# Patient Record
Sex: FEMALE | Race: WHITE | Employment: OTHER | URBAN - METROPOLITAN AREA
[De-identification: names, ages, dates, MRNs, and addresses within clinical notes are randomized per-mention and may not be internally consistent; named-entity substitution may affect disease eponyms.]

---

## 2018-03-20 ENCOUNTER — OFFICE VISIT (OUTPATIENT)
Dept: ORTHOPEDIC SURGERY | Age: 83
End: 2018-03-20

## 2018-03-20 VITALS
HEART RATE: 71 BPM | BODY MASS INDEX: 29.44 KG/M2 | HEIGHT: 62 IN | DIASTOLIC BLOOD PRESSURE: 56 MMHG | SYSTOLIC BLOOD PRESSURE: 113 MMHG | WEIGHT: 160 LBS

## 2018-03-20 DIAGNOSIS — G89.29 CHRONIC RIGHT SHOULDER PAIN: Primary | ICD-10-CM

## 2018-03-20 DIAGNOSIS — M25.511 CHRONIC RIGHT SHOULDER PAIN: Primary | ICD-10-CM

## 2018-03-20 DIAGNOSIS — M25.512 CHRONIC LEFT SHOULDER PAIN: ICD-10-CM

## 2018-03-20 DIAGNOSIS — M19.011 ARTHRITIS OF RIGHT SHOULDER REGION: ICD-10-CM

## 2018-03-20 DIAGNOSIS — M19.012 ARTHRITIS OF LEFT SHOULDER REGION: ICD-10-CM

## 2018-03-20 DIAGNOSIS — G89.29 CHRONIC LEFT SHOULDER PAIN: ICD-10-CM

## 2018-03-20 PROCEDURE — G8484 FLU IMMUNIZE NO ADMIN: HCPCS | Performed by: ORTHOPAEDIC SURGERY

## 2018-03-20 PROCEDURE — G8419 CALC BMI OUT NRM PARAM NOF/U: HCPCS | Performed by: ORTHOPAEDIC SURGERY

## 2018-03-20 PROCEDURE — 4040F PNEUMOC VAC/ADMIN/RCVD: CPT | Performed by: ORTHOPAEDIC SURGERY

## 2018-03-20 PROCEDURE — 20610 DRAIN/INJ JOINT/BURSA W/O US: CPT | Performed by: ORTHOPAEDIC SURGERY

## 2018-03-20 PROCEDURE — 99203 OFFICE O/P NEW LOW 30 MIN: CPT | Performed by: ORTHOPAEDIC SURGERY

## 2018-03-20 PROCEDURE — G8400 PT W/DXA NO RESULTS DOC: HCPCS | Performed by: ORTHOPAEDIC SURGERY

## 2018-03-20 PROCEDURE — 1123F ACP DISCUSS/DSCN MKR DOCD: CPT | Performed by: ORTHOPAEDIC SURGERY

## 2018-03-20 PROCEDURE — 1090F PRES/ABSN URINE INCON ASSESS: CPT | Performed by: ORTHOPAEDIC SURGERY

## 2018-03-20 PROCEDURE — G8427 DOCREV CUR MEDS BY ELIG CLIN: HCPCS | Performed by: ORTHOPAEDIC SURGERY

## 2018-03-20 PROCEDURE — 1036F TOBACCO NON-USER: CPT | Performed by: ORTHOPAEDIC SURGERY

## 2018-03-20 RX ORDER — METHYLPREDNISOLONE ACETATE 40 MG/ML
80 INJECTION, SUSPENSION INTRA-ARTICULAR; INTRALESIONAL; INTRAMUSCULAR; SOFT TISSUE ONCE
Status: COMPLETED | OUTPATIENT
Start: 2018-03-20 | End: 2018-03-20

## 2018-03-20 RX ORDER — NAPROXEN 500 MG/1
1 TABLET ORAL
COMMUNITY
End: 2018-11-28

## 2018-03-20 RX ORDER — LIDOCAINE HYDROCHLORIDE 10 MG/ML
4 INJECTION, SOLUTION INFILTRATION; PERINEURAL ONCE
Status: COMPLETED | OUTPATIENT
Start: 2018-03-20 | End: 2018-03-20

## 2018-03-20 RX ORDER — ERGOCALCIFEROL (VITAMIN D2) 1250 MCG
50000 CAPSULE ORAL
COMMUNITY
Start: 2016-10-12 | End: 2019-04-15 | Stop reason: ALTCHOICE

## 2018-03-20 RX ORDER — KETOCONAZOLE 200 MG/1
200 TABLET ORAL
COMMUNITY
End: 2019-04-15 | Stop reason: ALTCHOICE

## 2018-03-20 RX ORDER — LEVOTHYROXINE SODIUM 0.07 MG/1
75 TABLET ORAL DAILY
COMMUNITY
Start: 2016-10-12

## 2018-03-20 RX ORDER — ATORVASTATIN CALCIUM 40 MG/1
40 TABLET, FILM COATED ORAL DAILY
COMMUNITY
Start: 2016-10-12

## 2018-03-20 RX ADMIN — LIDOCAINE HYDROCHLORIDE 4 ML: 10 INJECTION, SOLUTION INFILTRATION; PERINEURAL at 14:06

## 2018-03-20 RX ADMIN — LIDOCAINE HYDROCHLORIDE 4 ML: 10 INJECTION, SOLUTION INFILTRATION; PERINEURAL at 14:07

## 2018-03-20 RX ADMIN — METHYLPREDNISOLONE ACETATE 80 MG: 40 INJECTION, SUSPENSION INTRA-ARTICULAR; INTRALESIONAL; INTRAMUSCULAR; SOFT TISSUE at 14:07

## 2018-03-20 ASSESSMENT — ENCOUNTER SYMPTOMS
EYES NEGATIVE: 1
RESPIRATORY NEGATIVE: 1

## 2018-03-21 NOTE — COMMUNICATION BODY
Subjective:      Patient ID: Avinash Espinoza is a 80 y.o. female. Avinash Espinoza presents today for evaluation of bilateral shoulder pain. She was referred by Dr. Shay Estrada. She's had pain for more than 2 years. She doesn't recall any trauma. She says it is related to \"H\". She has pain getting dressed or turning over in bed or using the bathroom. In general the right hurts more than the left. Pain is typically about 5 out of 10 but can be as bad as 10 out of 10. About 2 years ago she was treated with physical therapy that did not help. She has not had other interventions. She also has some neck trouble and has been scheduled for cervical injections. She is right-hand dominant. Review of Systems   Constitutional: Negative. HENT: Negative. Eyes: Negative. Respiratory: Negative. Cardiovascular: Negative. Gastrointestinal:        States she has IBS   Endocrine: Negative. Genitourinary: Negative. Musculoskeletal: Positive for arthralgias and neck pain. Allergic/Immunologic: Negative for food allergies. Neurological: Negative. Hematological: Bruises/bleeds easily. Psychiatric/Behavioral: Negative. Objective:   Physical Exam  General Exam:  Vitals: Blood pressure (!) 113/56, pulse 71, height 5' 2\" (1.575 m), weight 160 lb (72.6 kg). Constitutional: Patient is adequately groomed with no evidence of malnutrition  Mental Status: The patient is oriented to time, place and person. The patient's mood and affect are appropriate. Gait:  Patient walks with a stooped posture and a hesitant gait. Lymphatic: The lymphatic examination bilaterally reveals all areas to be without enlargement or induration. Vascular: Examination reveals no swelling or calf tenderness. Peripheral pulses are palpable and 2+. Neurological: The patient has good coordination. There is no weakness or sensory deficit. Skin:    Head/Neck: inspection reveals no rashes, ulcerations or lesions.   Trunk: inspection reveals no rashes, ulcerations or lesions. Right Lower Extremity: inspection reveals no rashes, ulcerations or lesions. Left Lower Extremity: inspection reveals no rashes, ulcerations or lesions. Cervical spine exam is uncomfortable but does not reproduce discomfort in either arm. Right shoulder has elevation to 90° and external rotation is 0. Internal rotation is to the right hip. Rotator cuff strength is 4/5. She has no instability. Neurologic exams are grossly normal.Left shoulder has elevation to about 120°. External rotation is 30°. Internal rotation is to the lumbar spine. Strength the cuff is 4+ over 5. She has no instability. Neurologic exams are normal.    Radiographs of the right shoulder were obtained today: AP in the scapular plane, axillary lateral, and scapular Y. These demonstrate: Severe, bone-on-bone shoulder arthritis with massive humeral head glenoid and subacromial osteophytes. Xrays of the left shoulder were obtained today. AP the scapular plane, axillary lateral, and scapular Y. These demonstrate: Severe, bone-on-bone shoulder arthritis. Assessment:      End-stage arthritis of both shoulders        Plan:      From a surgical treatment standpoint the really only option would be shoulder arthroplasty she is really not a good position to consider that at this point based on her request.  I offered her the option of cortisone injections and she accepted. We discussed this as an option. She understands we'll not cure her arthritis but may provide some level of relief. She understands we can repeat injections at three-month intervals if necessary. Procedure: Under sterile technique, the right shoulder was injected anteriorly into the glenohumeral space with 80 mg of Depo-Medrol and 40 mg of lidocaine. Procedure: Under sterile technique, the left shoulder was injected anteriorly into the glenohumeral space with 80 mg of Depo-Medrol and 40 mg of lidocaine.     She tolerated both shots just fine. She'll follow up with me on an as-needed basis    This note was created using voice recognition software. It has been proofread, but occasionally errors remain. Please disregard these errors. They will be corrected as they are noted.

## 2018-07-23 ENCOUNTER — OFFICE VISIT (OUTPATIENT)
Dept: ORTHOPEDIC SURGERY | Age: 83
End: 2018-07-23

## 2018-07-23 VITALS
WEIGHT: 163 LBS | DIASTOLIC BLOOD PRESSURE: 70 MMHG | RESPIRATION RATE: 12 BRPM | BODY MASS INDEX: 30 KG/M2 | HEIGHT: 62 IN | SYSTOLIC BLOOD PRESSURE: 145 MMHG | HEART RATE: 65 BPM

## 2018-07-23 DIAGNOSIS — M25.511 CHRONIC RIGHT SHOULDER PAIN: Primary | ICD-10-CM

## 2018-07-23 DIAGNOSIS — M19.012 ARTHRITIS OF LEFT SHOULDER REGION: ICD-10-CM

## 2018-07-23 DIAGNOSIS — G89.29 CHRONIC LEFT SHOULDER PAIN: ICD-10-CM

## 2018-07-23 DIAGNOSIS — G89.29 CHRONIC RIGHT SHOULDER PAIN: Primary | ICD-10-CM

## 2018-07-23 DIAGNOSIS — M19.011 ARTHRITIS OF RIGHT SHOULDER REGION: ICD-10-CM

## 2018-07-23 DIAGNOSIS — M25.512 CHRONIC LEFT SHOULDER PAIN: ICD-10-CM

## 2018-07-23 PROCEDURE — 1123F ACP DISCUSS/DSCN MKR DOCD: CPT | Performed by: ORTHOPAEDIC SURGERY

## 2018-07-23 PROCEDURE — 4040F PNEUMOC VAC/ADMIN/RCVD: CPT | Performed by: ORTHOPAEDIC SURGERY

## 2018-07-23 PROCEDURE — 99213 OFFICE O/P EST LOW 20 MIN: CPT | Performed by: ORTHOPAEDIC SURGERY

## 2018-07-23 PROCEDURE — G8419 CALC BMI OUT NRM PARAM NOF/U: HCPCS | Performed by: ORTHOPAEDIC SURGERY

## 2018-07-23 PROCEDURE — 1036F TOBACCO NON-USER: CPT | Performed by: ORTHOPAEDIC SURGERY

## 2018-07-23 PROCEDURE — G8427 DOCREV CUR MEDS BY ELIG CLIN: HCPCS | Performed by: ORTHOPAEDIC SURGERY

## 2018-07-23 PROCEDURE — G8400 PT W/DXA NO RESULTS DOC: HCPCS | Performed by: ORTHOPAEDIC SURGERY

## 2018-07-23 PROCEDURE — 1101F PT FALLS ASSESS-DOCD LE1/YR: CPT | Performed by: ORTHOPAEDIC SURGERY

## 2018-07-23 PROCEDURE — 1090F PRES/ABSN URINE INCON ASSESS: CPT | Performed by: ORTHOPAEDIC SURGERY

## 2018-07-23 PROCEDURE — 20610 DRAIN/INJ JOINT/BURSA W/O US: CPT | Performed by: ORTHOPAEDIC SURGERY

## 2018-07-23 RX ORDER — METHYLPREDNISOLONE ACETATE 40 MG/ML
80 INJECTION, SUSPENSION INTRA-ARTICULAR; INTRALESIONAL; INTRAMUSCULAR; SOFT TISSUE ONCE
Status: COMPLETED | OUTPATIENT
Start: 2018-07-23 | End: 2018-07-23

## 2018-07-23 RX ORDER — LATANOPROST 50 UG/ML
1 SOLUTION/ DROPS OPHTHALMIC NIGHTLY
COMMUNITY
End: 2019-04-15 | Stop reason: ALTCHOICE

## 2018-07-23 RX ADMIN — METHYLPREDNISOLONE ACETATE 80 MG: 40 INJECTION, SUSPENSION INTRA-ARTICULAR; INTRALESIONAL; INTRAMUSCULAR; SOFT TISSUE at 14:53

## 2018-07-23 NOTE — LETTER
[facility] [address] [facility] [address] [address]  Phone: [phone]  Fax: [phone]    Chavo Mendoza MD  July 23, 2018     Lucas Oliveros  [facility] [facility] [address]    Patient: Esha Gann  MR Number: [id number]  YOB: 1935  Date of Visit: 7/23/2018    Dear Dr. Russell Norman are the relevant portions of my assessment and plan of care. Esha Gann returns today for bilateral shoulder pain. The injection helped tremendously but only for less than a month. She wants further injections today. She is scheduled to have a nerve block at some point for neck and head pain. Currently pain is about 5 out of 10 with the left being more painful than the right. Patient's medications, allergies, past medical, surgical, social and family histories were reviewed and updated as appropriate. Relevant review of systems reviewed. General Exam:    Vitals: Blood pressure (!) 145/70, pulse 65, resp. rate 12, height 5' 2\" (1.575 m), weight 163 lb (73.9 kg). Constitutional: Patient is adequately groomed with no evidence of malnutrition  Mental Status: The patient is oriented to time, place and person. The patient's mood and affect are appropriate. Right shoulder is elevation 120°. His rotation is 0. Abduction strength is 4+ over 5. Left shoulder is elevation 130°. External rotation is 10°. Abduction strength is 4+ over 5. Neurologic and vascular exams of bilateral upper extremity are grossly intact. I reviewed her bilateral shoulder x-rays today which show endstage bone-on-bone glenohumeral arthritis. Assessment: Recurrent discomfort from end-stage shoulder arthritis. Plan: She has no interest in considering surgical treatment. She is requesting another set of injections today.   She understands that if he still provide relief for at least 6 weeks which probably should not repeat them. We discussed this at length today. Face-to-face this time including counseling was at least 15 minutes. Procedure: Under sterile technique, right shoulder is injected into the anterior glenohumeral joint with 80 mg of Depo-Medrol and 40 mg of Carbocaine. Procedure: Under sterile technique, the left shoulder was injected anteriorly into the glenohumeral joint with 80 mg of Depo-Medrol and 40 mg of Carbocaine. She tolerated the shots well. Follow-up on an as-needed basis. This note was created using voice recognition software. It has been proofread, but occasionally errors remain. Please disregard these errors. They will be corrected as they are noted. If you have questions, please do not hesitate to call me. I look forward to following Christina Harkins along with you.     Sincerely,    Cecilio Gilbert MD

## 2018-07-23 NOTE — COMMUNICATION BODY
well.  Follow-up on an as-needed basis. This note was created using voice recognition software. It has been proofread, but occasionally errors remain. Please disregard these errors. They will be corrected as they are noted.

## 2018-07-27 ENCOUNTER — TELEPHONE (OUTPATIENT)
Dept: ORTHOPEDIC SURGERY | Age: 83
End: 2018-07-27

## 2018-07-27 NOTE — TELEPHONE ENCOUNTER
Returned call to patient. Notified patient that water aerobics is good for patients with arthritis. Patient stated that she has been doing water aerobics for 4 years and she will continue. Patient will stop any activity that causes her pain.

## 2018-07-27 NOTE — TELEPHONE ENCOUNTER
Patient calling asking if she can do water aerobics and not aggravate her arthritis in her shoulders. Please call patient top advise.

## 2018-09-30 ENCOUNTER — HOSPITAL ENCOUNTER (EMERGENCY)
Age: 83
Discharge: HOME | End: 2018-09-30
Payer: MEDICARE

## 2018-09-30 ENCOUNTER — APPOINTMENT (OUTPATIENT)
Dept: GENERAL RADIOLOGY | Age: 83
End: 2018-09-30
Payer: MEDICARE

## 2018-09-30 ENCOUNTER — APPOINTMENT (OUTPATIENT)
Dept: CT IMAGING | Age: 83
End: 2018-09-30
Payer: MEDICARE

## 2018-09-30 VITALS
HEART RATE: 60 BPM | RESPIRATION RATE: 20 BRPM | TEMPERATURE: 97.6 F | HEIGHT: 62 IN | WEIGHT: 161.6 LBS | OXYGEN SATURATION: 97 % | DIASTOLIC BLOOD PRESSURE: 61 MMHG | SYSTOLIC BLOOD PRESSURE: 153 MMHG | BODY MASS INDEX: 29.74 KG/M2

## 2018-09-30 DIAGNOSIS — N39.0 URINARY TRACT INFECTION WITHOUT HEMATURIA, SITE UNSPECIFIED: ICD-10-CM
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
## 2018-09-30 DIAGNOSIS — S09.90XA CLOSED HEAD INJURY, INITIAL ENCOUNTER: Primary | ICD-10-CM

## 2018-09-30 LAB
A/G RATIO: 1.3 (ref 1.1–2.2)
ALBUMIN SERPL-MCNC: 4.1 G/DL (ref 3.4–5)
ALP BLD-CCNC: 97 U/L (ref 40–129)
ALT SERPL-CCNC: 9 U/L (ref 10–40)
ANION GAP SERPL CALCULATED.3IONS-SCNC: 11 MMOL/L (ref 3–16)
AST SERPL-CCNC: 20 U/L (ref 15–37)
BACTERIA: ABNORMAL /HPF
BASOPHILS ABSOLUTE: 0.1 K/UL (ref 0–0.2)
BASOPHILS RELATIVE PERCENT: 1.2 %
BILIRUB SERPL-MCNC: 0.4 MG/DL (ref 0–1)
BILIRUBIN URINE: NEGATIVE
BLOOD, URINE: NEGATIVE
BUN BLDV-MCNC: 13 MG/DL (ref 7–20)
CALCIUM SERPL-MCNC: 9.2 MG/DL (ref 8.3–10.6)
CHLORIDE BLD-SCNC: 102 MMOL/L (ref 99–110)
CLARITY: CLEAR
CO2: 28 MMOL/L (ref 21–32)
COLOR: YELLOW
CREAT SERPL-MCNC: 0.6 MG/DL (ref 0.6–1.2)
EOSINOPHILS ABSOLUTE: 0.1 K/UL (ref 0–0.6)
EOSINOPHILS RELATIVE PERCENT: 2 %
EPITHELIAL CELLS, UA: 2 /HPF (ref 0–5)
GFR AFRICAN AMERICAN: >60
GFR NON-AFRICAN AMERICAN: >60
GLOBULIN: 3.1 G/DL
GLUCOSE BLD-MCNC: 103 MG/DL (ref 70–99)
GLUCOSE URINE: NEGATIVE MG/DL
HCT VFR BLD CALC: 36.8 % (ref 36–48)
HEMOGLOBIN: 12.1 G/DL (ref 12–16)
HYALINE CASTS: 0 /LPF (ref 0–8)
KETONES, URINE: NEGATIVE MG/DL
LEUKOCYTE ESTERASE, URINE: ABNORMAL
LYMPHOCYTES ABSOLUTE: 1.5 K/UL (ref 1–5.1)
LYMPHOCYTES RELATIVE PERCENT: 23 %
MCH RBC QN AUTO: 28 PG (ref 26–34)
MCHC RBC AUTO-ENTMCNC: 32.9 G/DL (ref 31–36)
MCV RBC AUTO: 85.1 FL (ref 80–100)
MICROSCOPIC EXAMINATION: YES
MONOCYTES ABSOLUTE: 0.5 K/UL (ref 0–1.3)
MONOCYTES RELATIVE PERCENT: 7.4 %
NEUTROPHILS ABSOLUTE: 4.4 K/UL (ref 1.7–7.7)
NEUTROPHILS RELATIVE PERCENT: 66.4 %
NITRITE, URINE: POSITIVE
PDW BLD-RTO: 15.3 % (ref 12.4–15.4)
PH UA: 7
PLATELET # BLD: 199 K/UL (ref 135–450)
PMV BLD AUTO: 7.8 FL (ref 5–10.5)
POTASSIUM SERPL-SCNC: 4.8 MMOL/L (ref 3.5–5.1)
PROTEIN UA: NEGATIVE MG/DL
RBC # BLD: 4.33 M/UL (ref 4–5.2)
RBC UA: 1 /HPF (ref 0–4)
SODIUM BLD-SCNC: 141 MMOL/L (ref 136–145)
SPECIFIC GRAVITY UA: 1.01
TOTAL PROTEIN: 7.2 G/DL (ref 6.4–8.2)
TROPONIN: <0.01 NG/ML
URINE REFLEX TO CULTURE: YES
URINE TYPE: ABNORMAL
UROBILINOGEN, URINE: 0.2 E.U./DL
WBC # BLD: 6.6 K/UL (ref 4–11)
WBC UA: 15 /HPF (ref 0–5)

## 2018-09-30 PROCEDURE — 85025 COMPLETE CBC W/AUTO DIFF WBC: CPT

## 2018-09-30 PROCEDURE — 87186 SC STD MICRODIL/AGAR DIL: CPT

## 2018-09-30 PROCEDURE — 71045 X-RAY EXAM CHEST 1 VIEW: CPT

## 2018-09-30 PROCEDURE — 81001 URINALYSIS AUTO W/SCOPE: CPT

## 2018-09-30 PROCEDURE — 84484 ASSAY OF TROPONIN QUANT: CPT

## 2018-09-30 PROCEDURE — 80053 COMPREHEN METABOLIC PANEL: CPT

## 2018-09-30 PROCEDURE — 73560 X-RAY EXAM OF KNEE 1 OR 2: CPT

## 2018-09-30 PROCEDURE — 93010 ELECTROCARDIOGRAM REPORT: CPT | Performed by: INTERNAL MEDICINE

## 2018-09-30 PROCEDURE — 72125 CT NECK SPINE W/O DYE: CPT

## 2018-09-30 PROCEDURE — 87086 URINE CULTURE/COLONY COUNT: CPT

## 2018-09-30 PROCEDURE — 96365 THER/PROPH/DIAG IV INF INIT: CPT

## 2018-09-30 PROCEDURE — 87077 CULTURE AEROBIC IDENTIFY: CPT

## 2018-09-30 PROCEDURE — 6360000002 HC RX W HCPCS: Performed by: NURSE PRACTITIONER

## 2018-09-30 PROCEDURE — 2580000003 HC RX 258: Performed by: NURSE PRACTITIONER

## 2018-09-30 PROCEDURE — 93005 ELECTROCARDIOGRAM TRACING: CPT | Performed by: NURSE PRACTITIONER

## 2018-09-30 PROCEDURE — 70450 CT HEAD/BRAIN W/O DYE: CPT

## 2018-09-30 PROCEDURE — 99285 EMERGENCY DEPT VISIT HI MDM: CPT

## 2018-09-30 RX ORDER — CEFUROXIME AXETIL 250 MG/1
250 TABLET ORAL 2 TIMES DAILY
Qty: 14 TABLET | Refills: 0 | Status: SHIPPED | OUTPATIENT
Start: 2018-09-30 | End: 2018-10-07

## 2018-09-30 RX ADMIN — CEFTRIAXONE 1 G: 1 INJECTION, POWDER, FOR SOLUTION INTRAMUSCULAR; INTRAVENOUS at 13:50

## 2018-09-30 ASSESSMENT — PAIN DESCRIPTION - PAIN TYPE: TYPE: ACUTE PAIN

## 2018-09-30 ASSESSMENT — PAIN DESCRIPTION - LOCATION: LOCATION: HEAD

## 2018-09-30 ASSESSMENT — PAIN SCALES - GENERAL
PAINLEVEL_OUTOF10: 5
PAINLEVEL_OUTOF10: 3

## 2018-09-30 ASSESSMENT — PAIN DESCRIPTION - ORIENTATION: ORIENTATION: POSTERIOR

## 2018-09-30 NOTE — ED NOTES
Bed: A-16  Expected date:   Expected time:   Means of arrival: [facility]  Comments:  83F Fall down steps     Monster Giron RN  09/30/18 [id number]

## 2018-09-30 NOTE — ED PROVIDER NOTES
[address]  [address] [address]  Dept: [phone]  Loc: [phone]    eMERGENCY dEPARTMENT eNCOUnter        CHIEF COMPLAINT    Chief Complaint   Patient presents with    Fall     Patient states she slipped down steps and hit her head. Patient was walking up steps. patient states she hit her knees then her head. patient c/o head pain, bilateral knee pain and neck pain. patient states she has chronic neck pain. HPI    Pearl Shipley is a 80 y.o. female who presents with fall. Patient states she was walking up a flight of stairs and her feet got tripped up underneath her and she fell. She states she fell and hit both knees first and then her head. The patient denies syncope or LOC. The patient states she is not on blood thinners. She denies any chest pain or shortness of breath. She denies neck pain. She reports pain where she hit her head. She denies nausea or vomiting. She reports bilateral knee pain from fall. She denies numbness or paresthesias to the extremities. REVIEW OF SYSTEMS    Neurologic: no LOC, + Head injury  Cardiac: No Chest Pain, no syncope  Respiratory: No difficulty breathing  GI: No abdominal pain  Musculoskeletal: see HPI  All other systems reviewed and are negative.     PAST MEDICAL & SURGICAL HISTORY    Past Medical History:   Diagnosis Date    Cancer [facility])     SKIN    Glaucoma     Hyperlipidemia     IBS (irritable bowel syndrome)     Seasonal allergies     Thyroid disease     Weight loss      Past Surgical History:   Procedure Laterality Date    CYST REMOVAL      EYE SURGERY      CATARACT    UPPER GASTROINTESTINAL ENDOSCOPY  10/18/2016    Dr Maxwell Fuentes  (may include discharge medications prescribed in the ED)  Current Outpatient Rx   Medication Sig Dispense Refill    cefUROXime (CEFTIN) 250 MG tablet Take 1 tablet by mouth 2 times daily for 7 days no focal deficits on neurologic exam. She has no leukocytosis or anemia on CBC. CMP is unremarkable. Troponin is less than 0.01. X-rays of the knees show no acute bony or joint abnormalities. Tricompartmental osteoarthritic changes. CT the head without contrast shows no acute intracranial abnormalities. Urinalysis is positive for nitrites with small leukocytes. 4+ bacteria with 15 whites and 2 epithelials. She was given IV Rocephin and she was discharged with oral Rocephin. She was instructed to follow-up with her PCP and about 5 days to ensure resolution of infection. Closed head injury instructions were reviewed with the patient and her spouse and when to return to the emergency department. She is otherwise to follow up with her PCP. The patient verbalized understanding of the discharge instructions. FINAL IMPRESSION    1. Closed head injury, initial encounter    2. Fall, initial encounter    3.  Urinary tract infection without hematuria, site unspecified        PLAN  Discharge with outpatient instructions and follow-up (See EMR)      (Please note that this note was completed with a voice recognition program.  Every attempt was made to edit the dictations, but inevitably there remain words that are mis-transcribed.)            Shania Luna, KAHLIL Lauren CNP  10/02/18 [address] [place] [address], APRN - CNP  10/07/18 06

## 2018-10-02 LAB
ORGANISM: ABNORMAL
URINE CULTURE, ROUTINE: ABNORMAL
URINE CULTURE, ROUTINE: ABNORMAL

## 2018-10-03 LAB
EKG ATRIAL RATE: 64 BPM
EKG DIAGNOSIS: NORMAL
EKG P AXIS: 51 DEGREES
EKG P-R INTERVAL: 158 MS
EKG Q-T INTERVAL: 406 MS
EKG QRS DURATION: 82 MS
EKG QTC CALCULATION (BAZETT): 418 MS
EKG R AXIS: 11 DEGREES
EKG T AXIS: 43 DEGREES
EKG VENTRICULAR RATE: 64 BPM

## 2018-12-03 ENCOUNTER — ANESTHESIA EVENT (OUTPATIENT)
Dept: ENDOSCOPY | Age: 83
End: 2018-12-03
Payer: MEDICARE

## 2018-12-04 ENCOUNTER — ANESTHESIA (OUTPATIENT)
Dept: ENDOSCOPY | Age: 83
End: 2018-12-04
Payer: MEDICARE

## 2018-12-04 ENCOUNTER — HOSPITAL ENCOUNTER (OUTPATIENT)
Age: 83
Setting detail: OUTPATIENT SURGERY
Discharge: HOME | End: 2018-12-04
Attending: INTERNAL MEDICINE | Admitting: INTERNAL MEDICINE
Payer: MEDICARE

## 2018-12-04 VITALS
DIASTOLIC BLOOD PRESSURE: 61 MMHG | RESPIRATION RATE: 18 BRPM | OXYGEN SATURATION: 98 % | SYSTOLIC BLOOD PRESSURE: 106 MMHG

## 2018-12-04 VITALS
DIASTOLIC BLOOD PRESSURE: 60 MMHG | BODY MASS INDEX: 28.91 KG/M2 | HEART RATE: 54 BPM | RESPIRATION RATE: 16 BRPM | HEIGHT: 62 IN | SYSTOLIC BLOOD PRESSURE: 130 MMHG | TEMPERATURE: 96.5 F | OXYGEN SATURATION: 100 % | WEIGHT: 157.13 LBS

## 2018-12-04 PROCEDURE — 2500000003 HC RX 250 WO HCPCS: Performed by: NURSE ANESTHETIST, CERTIFIED REGISTERED

## 2018-12-04 PROCEDURE — 6360000002 HC RX W HCPCS: Performed by: NURSE ANESTHETIST, CERTIFIED REGISTERED

## 2018-12-04 PROCEDURE — 3609009500 HC COLONOSCOPY DIAGNOSTIC OR SCREENING: Performed by: INTERNAL MEDICINE

## 2018-12-04 PROCEDURE — 7100000010 HC PHASE II RECOVERY - FIRST 15 MIN: Performed by: INTERNAL MEDICINE

## 2018-12-04 PROCEDURE — 2580000003 HC RX 258: Performed by: NURSE ANESTHETIST, CERTIFIED REGISTERED

## 2018-12-04 PROCEDURE — 3700000001 HC ADD 15 MINUTES (ANESTHESIA): Performed by: INTERNAL MEDICINE

## 2018-12-04 PROCEDURE — 3700000000 HC ANESTHESIA ATTENDED CARE: Performed by: INTERNAL MEDICINE

## 2018-12-04 PROCEDURE — 7100000011 HC PHASE II RECOVERY - ADDTL 15 MIN: Performed by: INTERNAL MEDICINE

## 2018-12-04 PROCEDURE — 2580000003 HC RX 258: Performed by: ANESTHESIOLOGY

## 2018-12-04 RX ORDER — OXYCODONE HYDROCHLORIDE 5 MG/1
10 TABLET ORAL PRN
Status: DISCONTINUED | OUTPATIENT
Start: 2018-12-04 | End: 2018-12-04 | Stop reason: HOSPADM

## 2018-12-04 RX ORDER — MORPHINE SULFATE 4 MG/ML
1 INJECTION, SOLUTION INTRAMUSCULAR; INTRAVENOUS EVERY 5 MIN PRN
Status: DISCONTINUED | OUTPATIENT
Start: 2018-12-04 | End: 2018-12-04 | Stop reason: HOSPADM

## 2018-12-04 RX ORDER — PROMETHAZINE HYDROCHLORIDE 25 MG/ML
6.25 INJECTION, SOLUTION INTRAMUSCULAR; INTRAVENOUS
Status: DISCONTINUED | OUTPATIENT
Start: 2018-12-04 | End: 2018-12-04 | Stop reason: HOSPADM

## 2018-12-04 RX ORDER — SODIUM CHLORIDE 9 MG/ML
INJECTION, SOLUTION INTRAVENOUS CONTINUOUS
Status: DISCONTINUED | OUTPATIENT
Start: 2018-12-04 | End: 2018-12-04 | Stop reason: HOSPADM

## 2018-12-04 RX ORDER — METOCLOPRAMIDE HYDROCHLORIDE 5 MG/ML
10 INJECTION INTRAMUSCULAR; INTRAVENOUS
Status: DISCONTINUED | OUTPATIENT
Start: 2018-12-04 | End: 2018-12-04 | Stop reason: HOSPADM

## 2018-12-04 RX ORDER — LABETALOL HYDROCHLORIDE 5 MG/ML
5 INJECTION, SOLUTION INTRAVENOUS EVERY 10 MIN PRN
Status: DISCONTINUED | OUTPATIENT
Start: 2018-12-04 | End: 2018-12-04 | Stop reason: HOSPADM

## 2018-12-04 RX ORDER — SODIUM CHLORIDE 9 MG/ML
INJECTION, SOLUTION INTRAVENOUS CONTINUOUS PRN
Status: DISCONTINUED | OUTPATIENT
Start: 2018-12-04 | End: 2018-12-04 | Stop reason: SDUPTHER

## 2018-12-04 RX ORDER — MEPERIDINE HYDROCHLORIDE 25 MG/ML
12.5 INJECTION INTRAMUSCULAR; INTRAVENOUS; SUBCUTANEOUS EVERY 5 MIN PRN
Status: DISCONTINUED | OUTPATIENT
Start: 2018-12-04 | End: 2018-12-04 | Stop reason: HOSPADM

## 2018-12-04 RX ORDER — PROPOFOL 10 MG/ML
INJECTION, EMULSION INTRAVENOUS PRN
Status: DISCONTINUED | OUTPATIENT
Start: 2018-12-04 | End: 2018-12-04 | Stop reason: SDUPTHER

## 2018-12-04 RX ORDER — HYDRALAZINE HYDROCHLORIDE 20 MG/ML
5 INJECTION INTRAMUSCULAR; INTRAVENOUS EVERY 10 MIN PRN
Status: DISCONTINUED | OUTPATIENT
Start: 2018-12-04 | End: 2018-12-04 | Stop reason: HOSPADM

## 2018-12-04 RX ORDER — LIDOCAINE HYDROCHLORIDE 20 MG/ML
INJECTION, SOLUTION INFILTRATION; PERINEURAL PRN
Status: DISCONTINUED | OUTPATIENT
Start: 2018-12-04 | End: 2018-12-04 | Stop reason: SDUPTHER

## 2018-12-04 RX ORDER — DIPHENHYDRAMINE HYDROCHLORIDE 50 MG/ML
12.5 INJECTION INTRAMUSCULAR; INTRAVENOUS
Status: DISCONTINUED | OUTPATIENT
Start: 2018-12-04 | End: 2018-12-04 | Stop reason: HOSPADM

## 2018-12-04 RX ORDER — SODIUM CHLORIDE 0.9 % (FLUSH) 0.9 %
10 SYRINGE (ML) INJECTION EVERY 12 HOURS SCHEDULED
Status: DISCONTINUED | OUTPATIENT
Start: 2018-12-04 | End: 2018-12-04 | Stop reason: HOSPADM

## 2018-12-04 RX ORDER — OXYCODONE HYDROCHLORIDE 5 MG/1
5 TABLET ORAL PRN
Status: DISCONTINUED | OUTPATIENT
Start: 2018-12-04 | End: 2018-12-04 | Stop reason: HOSPADM

## 2018-12-04 RX ORDER — SODIUM CHLORIDE 0.9 % (FLUSH) 0.9 %
10 SYRINGE (ML) INJECTION PRN
Status: DISCONTINUED | OUTPATIENT
Start: 2018-12-04 | End: 2018-12-04 | Stop reason: HOSPADM

## 2018-12-04 RX ADMIN — SODIUM CHLORIDE: 0.9 INJECTION, SOLUTION INTRAVENOUS at 10:29

## 2018-12-04 RX ADMIN — LIDOCAINE HYDROCHLORIDE 20 MG: 20 INJECTION, SOLUTION INFILTRATION; PERINEURAL at 11:00

## 2018-12-04 RX ADMIN — PROPOFOL 20 MG: 10 INJECTION, EMULSION INTRAVENOUS at 11:02

## 2018-12-04 RX ADMIN — PROPOFOL 20 MG: 10 INJECTION, EMULSION INTRAVENOUS at 11:05

## 2018-12-04 RX ADMIN — LIDOCAINE HYDROCHLORIDE 10 MG: 20 INJECTION, SOLUTION INFILTRATION; PERINEURAL at 11:05

## 2018-12-04 RX ADMIN — LIDOCAINE HYDROCHLORIDE 10 MG: 20 INJECTION, SOLUTION INFILTRATION; PERINEURAL at 10:56

## 2018-12-04 RX ADMIN — LIDOCAINE HYDROCHLORIDE 10 MG: 20 INJECTION, SOLUTION INFILTRATION; PERINEURAL at 11:02

## 2018-12-04 RX ADMIN — SODIUM CHLORIDE: 9 INJECTION, SOLUTION INTRAVENOUS at 10:40

## 2018-12-04 RX ADMIN — PROPOFOL 20 MG: 10 INJECTION, EMULSION INTRAVENOUS at 10:56

## 2018-12-04 RX ADMIN — PROPOFOL 40 MG: 10 INJECTION, EMULSION INTRAVENOUS at 10:54

## 2018-12-04 RX ADMIN — PROPOFOL 40 MG: 10 INJECTION, EMULSION INTRAVENOUS at 11:00

## 2018-12-04 RX ADMIN — LIDOCAINE HYDROCHLORIDE 20 MG: 20 INJECTION, SOLUTION INFILTRATION; PERINEURAL at 10:54

## 2018-12-04 ASSESSMENT — PAIN SCALES - GENERAL
PAINLEVEL_OUTOF10: 0

## 2018-12-04 ASSESSMENT — PAIN - FUNCTIONAL ASSESSMENT: PAIN_FUNCTIONAL_ASSESSMENT: 0-10

## 2018-12-04 NOTE — ANESTHESIA PRE PROCEDURE
 ketoconazole (NIZORAL) 200 MG tablet Take 200 mg by mouth      bimatoprost (LUMIGAN) 0.01 % SOLN ophthalmic drops Apply to eye      brinzolamide-brimonidine 1-0.2 % SUSP       ergocalciferol (ERGOCALCIFEROL) 50709 units capsule Take 50,000 Units by mouth      nystatin 167911 UNIT/GM POWD Apply  topically 2 times daily.  omeprazole (PRILOSEC) 10 MG capsule Take 10 mg by mouth daily.  Cetirizine HCl (ZYRTEC ALLERGY PO) Take  by mouth. Allergies: Allergies   Allergen Reactions    Amoxicillin      diarrhea    Penicillins Rash       Problem List:    Patient Active Problem List   Diagnosis Code    Osteoarthritis of right knee M17.11       Past Medical History:        Diagnosis Date    Cancer ([facility] [address]     SKIN    Glaucoma     Hyperlipidemia     IBS (irritable bowel syndrome)     Seasonal allergies     Thyroid disease     Weight loss        Past Surgical History:        Procedure Laterality Date    COLONOSCOPY      CYST REMOVAL      EYE SURGERY      CATARACT    UPPER GASTROINTESTINAL ENDOSCOPY  10/18/2016    Dr Kelly Alejandre History:    Social History   Substance Use Topics    Smoking status: Never Smoker    Smokeless tobacco: Never Used    Alcohol use No                                Counseling given: Not Answered      Vital Signs (Current):   Vitals:    11/28/18 0828   Weight: 153 lb (69.4 kg)   Height: 5' 2\" (1.575 m)                                              BP Readings from Last 3 Encounters:   09/30/18 (!) 153/61   07/23/18 (!) 145/70   03/20/18 (!) 113/56       NPO Status:                                                                                 BMI:   Wt Readings from Last 3 Encounters:   09/30/18 161 lb 9.6 oz (73.3 kg)   07/23/18 163 lb (73.9 kg)   03/20/18 160 lb (72.6 kg)     Body mass index is 27.98 kg/m².     CBC:   Lab Results   Component Value Date    WBC 6.6 09/30/2018    RBC 4.33 09/30/2018    HGB 12.1 09/30/2018    HCT 36.8 09/30/2018    MCV

## 2019-04-15 ENCOUNTER — APPOINTMENT (OUTPATIENT)
Dept: GENERAL RADIOLOGY | Age: 84
DRG: 482 | End: 2019-04-15
Payer: MEDICARE

## 2019-04-15 ENCOUNTER — HOSPITAL ENCOUNTER (INPATIENT)
Age: 84
LOS: 3 days | Discharge: NURSING, REHAB OR LONG-TERM CARE | DRG: 482 | End: 2019-04-18
Attending: INTERNAL MEDICINE | Admitting: INTERNAL MEDICINE
Payer: MEDICARE

## 2019-04-15 ENCOUNTER — OFFICE VISIT (OUTPATIENT)
Dept: ORTHOPEDIC SURGERY | Age: 84
End: 2019-04-15
Payer: MEDICARE

## 2019-04-15 VITALS
HEART RATE: 60 BPM | BODY MASS INDEX: 28.88 KG/M2 | DIASTOLIC BLOOD PRESSURE: 76 MMHG | HEIGHT: 63 IN | SYSTOLIC BLOOD PRESSURE: 155 MMHG | WEIGHT: 163 LBS

## 2019-04-15 DIAGNOSIS — M25.511 CHRONIC RIGHT SHOULDER PAIN: Primary | ICD-10-CM

## 2019-04-15 DIAGNOSIS — M25.512 CHRONIC LEFT SHOULDER PAIN: ICD-10-CM

## 2019-04-15 DIAGNOSIS — G89.29 CHRONIC RIGHT SHOULDER PAIN: Primary | ICD-10-CM

## 2019-04-15 DIAGNOSIS — M19.012 ARTHRITIS OF LEFT SHOULDER REGION: ICD-10-CM

## 2019-04-15 DIAGNOSIS — S72.002A CLOSED FRACTURE OF LEFT HIP, INITIAL ENCOUNTER (HCC): Primary | ICD-10-CM

## 2019-04-15 DIAGNOSIS — M19.011 ARTHRITIS OF RIGHT SHOULDER REGION: ICD-10-CM

## 2019-04-15 DIAGNOSIS — G89.29 CHRONIC LEFT SHOULDER PAIN: ICD-10-CM

## 2019-04-15 PROBLEM — E78.2 MIXED HYPERLIPIDEMIA: Status: ACTIVE | Noted: 2019-04-15

## 2019-04-15 PROBLEM — K58.0 IRRITABLE BOWEL SYNDROME WITH DIARRHEA: Status: ACTIVE | Noted: 2019-04-15

## 2019-04-15 PROBLEM — H40.89 OTHER SPECIFIED GLAUCOMA: Status: ACTIVE | Noted: 2019-04-15

## 2019-04-15 PROBLEM — E03.9 ACQUIRED HYPOTHYROIDISM: Status: ACTIVE | Noted: 2019-04-15

## 2019-04-15 LAB
ABO/RH: NORMAL
ANION GAP SERPL CALCULATED.3IONS-SCNC: 10 MMOL/L (ref 3–16)
ANTIBODY SCREEN: NORMAL
APTT: 32.5 SEC (ref 26–36)
BASOPHILS ABSOLUTE: 0.1 K/UL (ref 0–0.2)
BASOPHILS RELATIVE PERCENT: 0.9 %
BUN BLDV-MCNC: 11 MG/DL (ref 7–20)
CALCIUM SERPL-MCNC: 9.1 MG/DL (ref 8.3–10.6)
CHLORIDE BLD-SCNC: 105 MMOL/L (ref 99–110)
CO2: 29 MMOL/L (ref 21–32)
CREAT SERPL-MCNC: 0.7 MG/DL (ref 0.6–1.2)
EOSINOPHILS ABSOLUTE: 0.1 K/UL (ref 0–0.6)
EOSINOPHILS RELATIVE PERCENT: 0.7 %
GFR AFRICAN AMERICAN: >60
GFR NON-AFRICAN AMERICAN: >60
GLUCOSE BLD-MCNC: 88 MG/DL (ref 70–99)
HCT VFR BLD CALC: 36.9 % (ref 36–48)
HEMOGLOBIN: 12.2 G/DL (ref 12–16)
INR BLD: 1.11 (ref 0.86–1.14)
LYMPHOCYTES ABSOLUTE: 1.4 K/UL (ref 1–5.1)
LYMPHOCYTES RELATIVE PERCENT: 17.9 %
MCH RBC QN AUTO: 28.4 PG (ref 26–34)
MCHC RBC AUTO-ENTMCNC: 33.2 G/DL (ref 31–36)
MCV RBC AUTO: 85.6 FL (ref 80–100)
MONOCYTES ABSOLUTE: 0.3 K/UL (ref 0–1.3)
MONOCYTES RELATIVE PERCENT: 4.5 %
NEUTROPHILS ABSOLUTE: 5.8 K/UL (ref 1.7–7.7)
NEUTROPHILS RELATIVE PERCENT: 76 %
PDW BLD-RTO: 14 % (ref 12.4–15.4)
PLATELET # BLD: 181 K/UL (ref 135–450)
PMV BLD AUTO: 7.6 FL (ref 5–10.5)
POTASSIUM REFLEX MAGNESIUM: 4.2 MMOL/L (ref 3.5–5.1)
PROTHROMBIN TIME: 12.7 SEC (ref 9.8–13)
RBC # BLD: 4.31 M/UL (ref 4–5.2)
SODIUM BLD-SCNC: 144 MMOL/L (ref 136–145)
WBC # BLD: 7.6 K/UL (ref 4–11)

## 2019-04-15 PROCEDURE — 85025 COMPLETE CBC W/AUTO DIFF WBC: CPT

## 2019-04-15 PROCEDURE — 2580000003 HC RX 258: Performed by: PHYSICIAN ASSISTANT

## 2019-04-15 PROCEDURE — 36415 COLL VENOUS BLD VENIPUNCTURE: CPT

## 2019-04-15 PROCEDURE — 86900 BLOOD TYPING SEROLOGIC ABO: CPT

## 2019-04-15 PROCEDURE — 2060000000 HC ICU INTERMEDIATE R&B

## 2019-04-15 PROCEDURE — 86901 BLOOD TYPING SEROLOGIC RH(D): CPT

## 2019-04-15 PROCEDURE — 1123F ACP DISCUSS/DSCN MKR DOCD: CPT | Performed by: ORTHOPAEDIC SURGERY

## 2019-04-15 PROCEDURE — 1090F PRES/ABSN URINE INCON ASSESS: CPT | Performed by: ORTHOPAEDIC SURGERY

## 2019-04-15 PROCEDURE — 85610 PROTHROMBIN TIME: CPT

## 2019-04-15 PROCEDURE — 80048 BASIC METABOLIC PNL TOTAL CA: CPT

## 2019-04-15 PROCEDURE — 96361 HYDRATE IV INFUSION ADD-ON: CPT

## 2019-04-15 PROCEDURE — 96360 HYDRATION IV INFUSION INIT: CPT

## 2019-04-15 PROCEDURE — 6360000002 HC RX W HCPCS: Performed by: NURSE PRACTITIONER

## 2019-04-15 PROCEDURE — 2580000003 HC RX 258: Performed by: NURSE PRACTITIONER

## 2019-04-15 PROCEDURE — 4040F PNEUMOC VAC/ADMIN/RCVD: CPT | Performed by: ORTHOPAEDIC SURGERY

## 2019-04-15 PROCEDURE — 85730 THROMBOPLASTIN TIME PARTIAL: CPT

## 2019-04-15 PROCEDURE — 86850 RBC ANTIBODY SCREEN: CPT

## 2019-04-15 PROCEDURE — 99212 OFFICE O/P EST SF 10 MIN: CPT | Performed by: ORTHOPAEDIC SURGERY

## 2019-04-15 PROCEDURE — G8419 CALC BMI OUT NRM PARAM NOF/U: HCPCS | Performed by: ORTHOPAEDIC SURGERY

## 2019-04-15 PROCEDURE — 1036F TOBACCO NON-USER: CPT | Performed by: ORTHOPAEDIC SURGERY

## 2019-04-15 PROCEDURE — G8400 PT W/DXA NO RESULTS DOC: HCPCS | Performed by: ORTHOPAEDIC SURGERY

## 2019-04-15 PROCEDURE — 73502 X-RAY EXAM HIP UNI 2-3 VIEWS: CPT

## 2019-04-15 PROCEDURE — 99285 EMERGENCY DEPT VISIT HI MDM: CPT

## 2019-04-15 PROCEDURE — G8427 DOCREV CUR MEDS BY ELIG CLIN: HCPCS | Performed by: ORTHOPAEDIC SURGERY

## 2019-04-15 PROCEDURE — 94760 N-INVAS EAR/PLS OXIMETRY 1: CPT

## 2019-04-15 RX ORDER — TRIMETHOPRIM 100 MG/1
100 TABLET ORAL DAILY
Status: ON HOLD | COMMUNITY
End: 2019-10-11

## 2019-04-15 RX ORDER — CLINDAMYCIN PHOSPHATE 900 MG/50ML
900 INJECTION INTRAVENOUS
Status: COMPLETED | OUTPATIENT
Start: 2019-04-16 | End: 2019-04-16

## 2019-04-15 RX ORDER — SODIUM CHLORIDE 0.9 % (FLUSH) 0.9 %
10 SYRINGE (ML) INJECTION PRN
Status: DISCONTINUED | OUTPATIENT
Start: 2019-04-15 | End: 2019-04-16 | Stop reason: SDUPTHER

## 2019-04-15 RX ORDER — ONDANSETRON 2 MG/ML
4 INJECTION INTRAMUSCULAR; INTRAVENOUS EVERY 6 HOURS PRN
Status: DISCONTINUED | OUTPATIENT
Start: 2019-04-15 | End: 2019-04-16 | Stop reason: SDUPTHER

## 2019-04-15 RX ORDER — MORPHINE SULFATE 2 MG/ML
2 INJECTION, SOLUTION INTRAMUSCULAR; INTRAVENOUS EVERY 4 HOURS PRN
Status: DISPENSED | OUTPATIENT
Start: 2019-04-15 | End: 2019-04-16

## 2019-04-15 RX ORDER — SODIUM CHLORIDE 9 MG/ML
INJECTION, SOLUTION INTRAVENOUS CONTINUOUS
Status: DISCONTINUED | OUTPATIENT
Start: 2019-04-15 | End: 2019-04-16 | Stop reason: ALTCHOICE

## 2019-04-15 RX ORDER — MORPHINE SULFATE 2 MG/ML
4 INJECTION, SOLUTION INTRAMUSCULAR; INTRAVENOUS ONCE
Status: DISCONTINUED | OUTPATIENT
Start: 2019-04-15 | End: 2019-04-18 | Stop reason: HOSPADM

## 2019-04-15 RX ORDER — SODIUM CHLORIDE 0.9 % (FLUSH) 0.9 %
10 SYRINGE (ML) INJECTION EVERY 12 HOURS SCHEDULED
Status: DISCONTINUED | OUTPATIENT
Start: 2019-04-15 | End: 2019-04-16 | Stop reason: SDUPTHER

## 2019-04-15 RX ORDER — OXYBUTYNIN CHLORIDE 10 MG/1
10 TABLET, EXTENDED RELEASE ORAL DAILY
Status: ON HOLD | COMMUNITY
End: 2019-10-11

## 2019-04-15 RX ORDER — ACETAMINOPHEN 325 MG/1
650 TABLET ORAL EVERY 4 HOURS PRN
Status: DISCONTINUED | OUTPATIENT
Start: 2019-04-15 | End: 2019-04-18 | Stop reason: HOSPADM

## 2019-04-15 RX ORDER — SODIUM CHLORIDE 9 MG/ML
INJECTION, SOLUTION INTRAVENOUS CONTINUOUS
Status: DISCONTINUED | OUTPATIENT
Start: 2019-04-15 | End: 2019-04-15

## 2019-04-15 RX ADMIN — SODIUM CHLORIDE: 9 INJECTION, SOLUTION INTRAVENOUS at 22:00

## 2019-04-15 RX ADMIN — MORPHINE SULFATE 2 MG: 2 INJECTION, SOLUTION INTRAMUSCULAR; INTRAVENOUS at 23:45

## 2019-04-15 RX ADMIN — SODIUM CHLORIDE, PRESERVATIVE FREE 10 ML: 5 INJECTION INTRAVENOUS at 21:45

## 2019-04-15 RX ADMIN — SODIUM CHLORIDE: 9 INJECTION, SOLUTION INTRAVENOUS at 18:49

## 2019-04-15 ASSESSMENT — PAIN DESCRIPTION - ORIENTATION: ORIENTATION: LEFT

## 2019-04-15 ASSESSMENT — PAIN DESCRIPTION - PAIN TYPE: TYPE: ACUTE PAIN

## 2019-04-15 ASSESSMENT — PAIN DESCRIPTION - LOCATION: LOCATION: HIP

## 2019-04-15 ASSESSMENT — PAIN DESCRIPTION - PROGRESSION: CLINICAL_PROGRESSION: GRADUALLY WORSENING

## 2019-04-15 ASSESSMENT — PAIN DESCRIPTION - DESCRIPTORS: DESCRIPTORS: ACHING

## 2019-04-15 ASSESSMENT — PAIN SCALES - GENERAL: PAINLEVEL_OUTOF10: 10

## 2019-04-15 ASSESSMENT — PAIN DESCRIPTION - FREQUENCY: FREQUENCY: CONTINUOUS

## 2019-04-15 ASSESSMENT — PAIN - FUNCTIONAL ASSESSMENT: PAIN_FUNCTIONAL_ASSESSMENT: PREVENTS OR INTERFERES SOME ACTIVE ACTIVITIES AND ADLS

## 2019-04-15 ASSESSMENT — PAIN DESCRIPTION - ONSET: ONSET: GRADUAL

## 2019-04-15 NOTE — ED PROVIDER NOTES
HISTORY         Diagnosis Date    Cancer [facility])     SKIN    Glaucoma     Hyperlipidemia     IBS (irritable bowel syndrome)     Seasonal allergies     Thyroid disease     Weight loss        SURGICAL HISTORY           Procedure Laterality Date    COLONOSCOPY  2018    Silver-normal    CYST REMOVAL      EYE SURGERY      CATARACT    [place] COLONOSCOPY FLX DX W/COLLJ SPEC WHEN PFRMD N/A 2018    COLONOSCOPY DIAGNOSTIC OR SCREENING performed by Tony Newton MD at [address]  10/18/2016    Dr Paulie Quezada       Previous Medications    ATORVASTATIN (LIPITOR) 40 MG TABLET    Take 40 mg by mouth daily     BIMATOPROST (LUMIGAN) 0.01 % SOLN OPHTHALMIC DROPS    Apply to eye nightly     BRINZOLAMIDE-BRIMONIDINE 1-0.2 % SUSP    2 times daily     CETIRIZINE HCL (ZYRTEC ALLERGY PO)    Take  by mouth. KETOCONAZOLE (NIZORAL) 200 MG TABLET    Take 200 mg by mouth    LEVOTHYROXINE (SYNTHROID) 75 MCG TABLET    Take 75 mcg by mouth Daily     NYSTATIN 482478 UNIT/GM POWD    Apply  topically 2 times daily. OMEPRAZOLE (PRILOSEC) 20 MG DELAYED RELEASE CAPSULE    Take 20 mg by mouth daily     TRIAMCINOLONE (KENALOG) 0.1 % OINTMENT    Apply topically 2 times daily Apply topically 2 times daily. ALLERGIES     Amoxicillin and Penicillins    FAMILY HISTORY           Problem Relation Age of Onset    Diabetes Mother     Stroke Mother     Heart Disease Mother     COPD Brother      Family Status   Relation Name Status    Mother      Father      Brother  (Not Specified)        SOCIAL HISTORY      reports that she has never smoked. She has never used smokeless tobacco. She reports that she does not drink alcohol or use drugs.     PHYSICAL EXAM    (up to 7 for level 4, 8 or more for level 5)     ED Triage Vitals   BP Temp Temp Source Pulse Resp SpO2 Height Weight   04/15/19 1551 04/15/19 1551 04/15/19 1551 04/15/19 1551 04/15/19 1551 04/15/19 1551 -- 04/15/19 1641   (!) 147/80 98.2 °F (36.8 °C) Oral 64 19 98 %  163 lb 2.3 oz (74 kg)       Constitutional:  Appearing well-developed and well-nourished. No distress. HENT:  Normocephalic and atraumatic. Cardiovascular:  Normal rate, regular rhythm, normal heart sounds and intact distal pulses. Pulmonary/Chest:  Effort normal and breath sounds normal. No respiratory distress. Musculoskeletal:  Mild tenderness to palpation of the left hip anteriorly and laterally, with pain reported on attempts at range of motion exercises. Negative for tenderness to palpation throughout the spine. Normal examination of the left knee and ankle, with good range of motion. No edema exhibited. Sensation to light touch grossly intact and capillary refill <3 seconds in the left lower extremity. Neurological:  Oriented to person, place, and time. No cranial nerve deficit. Skin:  Skin is warm and dry. Not diaphoretic. Psychiatric:  Normal mood, affect, behavior, judgment and thought content. DIAGNOSTIC RESULTS     RADIOLOGY:     Interpretation per the Radiologist below, if available at the time of this note:    XR HIP LEFT (2-3 VIEWS)   Final Result   Mildly displaced fracture through the junction of the left femoral head and   neck.              LABS:  Labs Reviewed   CBC WITH AUTO DIFFERENTIAL    Narrative:     Performed at:  [facility]  [address] [place], [place] [address]   Phone [phone]   BASIC METABOLIC PANEL W/ REFLEX TO MG FOR LOW K    Narrative:     Performed at:  [facility]  [address] [place], [place] [address]   Phone [phone]   PROTIME-INR    Narrative:     Performed at:  [facility]  [address] [place], [place] [address]   Phone [phone]   APTT    Narrative:     Performed at:  [address] [address]   Phone [phone]   TYPE AND SCREEN       All other labs were within normal range or not returned as of this dictation. EMERGENCY DEPARTMENT COURSE and DIFFERENTIAL DIAGNOSIS/MDM:   Vitals:    Vitals:    04/15/19 1551 04/15/19 1641   BP: (!) 147/80    Pulse: 64    Resp: 19    Temp: 98.2 °F (36.8 °C)    TempSrc: Oral    SpO2: 98%    Weight:  163 lb 2.3 oz (74 kg)       The patient's condition in the ED was good, the patient was afebrile and nontoxic in appearance, and the patient's physical exam was unremarkable other than for the left hip findings noted above. No other injuries reported or found on exam.  X-ray confirmed a left hip fracture, but the patient had good neurovascular status. A consult was placed to orthopedic physician on call, but a couple of hours passed before receiving any reply. The orthopedist Dr. Kirill Lr was then paged, who agreed to accept the patient for surgery and asked for hospitalist admission. I then consulted the hospitalist, who agreed to accept and admit the patient. PROCEDURES:  None    FINAL IMPRESSION      1. Closed fracture of left hip, initial encounter [facility])          DISPOSITION/PLAN   DISPOSITION Decision To Admit 04/15/2019 07:11:30 PM      PATIENT REFERRED TO:  No follow-up provider specified.     DISCHARGE MEDICATIONS:  New Prescriptions    No medications on file       (Please note that portions of this note were completed with a voice recognition program.  Efforts were made to edit the dictations but occasionally words are mis-transcribed.)    James Bills [address]  04/15/19 5915

## 2019-04-15 NOTE — H&P
Hospital Medicine History & Physical      PCP: Shama Wells    Date of Admission: 4/15/2019    Date of Service: Pt seen/examined on 4/15/2019 and Admitted to Inpatient with expected LOS greater than two midnights due to medical therapy. Chief Complaint:  Left hip fracture, Fall      History Of Present Illness:      80 y.o. female with PMHx of Glaucoma, hyperlipidemia, IBS, hypothyroidism and recent weight loss  presented to [facility] with Mechanical fall. Patient reports that she was walking into her garage and there is uneven concrete. She tripped over that landing on her left hip. She denies hitting her head and no other injuries. X-rays confirmed left hip fracture. Past Medical History:          Diagnosis Date    Cancer [facility])     SKIN    Glaucoma     Hyperlipidemia     IBS (irritable bowel syndrome)     Seasonal allergies     Thyroid disease     Weight loss        Past Surgical History:          Procedure Laterality Date    COLONOSCOPY  12/04/2018    Silver-normal    CYST REMOVAL      EYE SURGERY      CATARACT    [place] COLONOSCOPY FLX DX W/COLLJ SPEC WHEN PFRMD N/A 12/4/2018    COLONOSCOPY DIAGNOSTIC OR SCREENING performed by Maurice Pablo MD at [address] [facility]  10/18/2016    Dr Rogelio Adam       Medications Prior to Admission:      Prior to Admission medications    Medication Sig Start Date End Date Taking?  Authorizing Provider   CRANBERRY PO Take by mouth daily   Yes Historical Provider, MD   oxybutynin (DITROPAN-XL) 10 MG extended release tablet Take 10 mg by mouth daily   Yes Historical Provider, MD   vitamin D (CHOLECALCIFEROL) 1000 UNIT TABS tablet Take 1,000 Units by mouth daily   Yes Historical Provider, MD   trimethoprim (TRIMPEX) 100 MG tablet Take 100 mg by mouth daily    Yes Historical Provider, MD   conjugated estrogens (PREMARIN) 0.625 MG/GM vaginal cream Place 0.5 g vaginally Twice a Week    Yes Historical Provider, MD   triamcinolone (KENALOG) 0.1 % ointment Apply topically 2 times daily as needed    Yes Historical Provider, MD   atorvastatin (LIPITOR) 40 MG tablet Take 40 mg by mouth daily  10/12/16  Yes Historical Provider, MD   levothyroxine (SYNTHROID) 75 MCG tablet Take 75 mcg by mouth Daily  10/12/16  Yes Historical Provider, MD   bimatoprost (LUMIGAN) 0.01 % SOLN ophthalmic drops Place 1 drop into both eyes nightly    Yes Historical Provider, MD   brinzolamide-brimonidine 1-0.2 % SUSP Place 1 drop into both eyes 2 times daily    Yes Historical Provider, MD   nystatin 225982 UNIT/GM POWD Apply topically 2 times daily as needed    Yes Historical Provider, MD   omeprazole (PRILOSEC) 20 MG delayed release capsule Take 20 mg by mouth daily    Yes Historical Provider, MD       Allergies:  Amoxicillin and Penicillins    Social History:      The patient currently lives At home with her     TOBACCO:   reports that she has never smoked. She has never used smokeless tobacco.  ETOH:   reports that she does not drink alcohol. Family History:      Reviewed in detail positive as follows:        Problem Relation Age of Onset    Diabetes Mother     Stroke Mother     Heart Disease Mother     COPD Brother        REVIEW OF SYSTEMS:   Pertinent positives as noted in the HPI. All other systems reviewed and negative. PHYSICAL EXAM PERFORMED:    BP (!) 178/162   Pulse 69   Temp 98.7 °F (37.1 °C) (Oral)   Resp 20   Wt 163 lb 2.3 oz (74 kg)   SpO2 96%   BMI 28.90 kg/m²     General appearance:  No apparent distress, appears stated age and cooperative. HEENT:  Normal cephalic, atraumatic without obvious deformity. Pupils equal, round, and reactive to light. Extra ocular muscles intact. Conjunctivae/corneas clear. Neck: Supple, with full range of motion. No jugular venous distention. Trachea midline. Respiratory:  Normal respiratory effort. Clear to auscultation, bilaterally without Rales/Wheezes/Rhonchi.   Cardiovascular:  Regular rate  Acquired hypothyroidism [E03.9] 04/15/2019    Other specified glaucoma [H40.89] 04/15/2019    Irritable bowel syndrome with diarrhea [K58.0] 04/15/2019         PLAN:    Hip fracture - secondary to a mechanical fall. Orthopedic Surgery has been consulted, Patient has been scheduled for surgery tomorrow morning by SUZAN Ardon. H and family are aware. Pt has been made NPO after midnight. There are no medical barriers to surgery if needed, and patient is a good surgical risk. Left hip pain - IV Morphine will be provided prn pain. DVT Prophylaxis: Lovenox  Diet: Diet NPO, After Midnight Exceptions are: Ice Chips, Sips with Meds  Diet NPO Time Specified  Code Status: Full Code    PT/OT Eval Status: To be determined after surgery    Dispo - Admitted       KAHLIL Lim - CNP    Thank you Panchito Abraham for the opportunity to be involved in this patient's care. If you have any questions or concerns please feel free to contact me at [phone].

## 2019-04-15 NOTE — LETTER
[facility] [address] [facility]  Phone: [phone]  Fax: [phone]    Taylor Cockayne, MD    April 15, 2019     Lakia Garner [facility] [address]    Patient: Hernando Her  MR Number: [id number]  YOB: 1935  Date of Visit: 4/15/2019    Dear Dr. Manohar Carter:    Thank you for the request for consultation for Lilli Harkins to me for the evaluation of bilateral shoulder pain. Below are the relevant portions of my assessment and plan of care. Assessment:       Plan:   Hernando Her presents today to discuss her shoulders. She is very severe arthritic change. She said couple of cortisone injections without any level of relief. She says she is not interested in surgery. They had questions today about glucosamine and chondroitin as well as physical therapy. She says pain is 8 or 9 out of 10. Patient's medications, allergies, past medical, surgical, social and family histories were reviewed and updated as appropriate. Relevant review of systems reviewed. General Exam:    Vitals: Blood pressure (!) 155/76, pulse 60, height 5' 3\" (1.6 m), weight 163 lb (73.9 kg). Constitutional: Patient is adequately groomed with no evidence of malnutrition  Mental Status: The patient is oriented to time, place and person. The patient's mood and affect are appropriate. Right shoulder has active elevation to about 50°,  internal rotation to the belly and external rotation to -10. Neurologic and vascular exams are normal.    Left shoulder is elevation to 50° internal rotation to the belly and extra rotation to about 0. Neurologic and vascular exams are normal.    I reviewed her x-rays of both shoulders right has tremendously severe end-stage glenohumeral arthritis and left as severe glenohumeral arthritis as well. Assessment: Endstage arthritic change bilateral shoulders. Plan: If she chooses to take glucosamine chondroitin and they provide relief that's great. If she does not get relief that's okay. She is not interested in more cortisone is as not been helpful and I agree. From a therapy standpoint, I think pool therapy as her best bet. I do not believe physical therapy will markedly alter her outcome. This note was created using voice recognition software. It has been proofread, but occasionally errors remain. Please disregard these errors. They will be corrected as they are noted. If you have questions, please do not hesitate to call me. I look forward to following Tanika Guerra along with you.     Sincerely,        Estelle Flores MD

## 2019-04-15 NOTE — PROGRESS NOTES
[facility] Orthopedic Surgery  Consult Note          Orthopedic Consult, full note to follow. Hernando Her 80 y.o. admitted for a fall with left hip pain. Xray reviewed, and showed left femoral neck valgus impacted fracture    Plan:  - Recommend left hip pinning.  - I discussed with Hernando Her the overall alignment of the fracture and treatment options including both surgical and non-surgical treatment, and that my recommendation is left hip pinning. I discussed the risks and benefits of surgery with the patient, including but not limited to infection, bleeding, pain, injury to nerves or blood vessels failure of the surgery and need for additional surgery. All the patient's questions were answered. We discussed an expected post-operative course. She  is understanding of this and wishes to proceed. Surgery tomorrow 7:30 am    Thank you very much for the kind consultation and allowing me to participate in this patient's care. I will continue to keep you apprised of her progress.         Christi Saldana MD 4/15/2019 7:41 PM

## 2019-04-15 NOTE — ED NOTES
Pt denies c/p,sob or dizziness. Pt reports that she lost her balance on uneven pavement and fell. Pt was having right hip pain before arrival to ED. Pt denies any pain at this time.       Jennifer Daley RN  04/15/19 6990

## 2019-04-15 NOTE — COMMUNICATION BODY
Assessment:       Plan:   Santo Marte presents today to discuss her shoulders. She is very severe arthritic change. She said couple of cortisone injections without any level of relief. She says she is not interested in surgery. They had questions today about glucosamine and chondroitin as well as physical therapy. She says pain is 8 or 9 out of 10. Patient's medications, allergies, past medical, surgical, social and family histories were reviewed and updated as appropriate. Relevant review of systems reviewed. General Exam:    Vitals: Blood pressure (!) 155/76, pulse 60, height 5' 3\" (1.6 m), weight 163 lb (73.9 kg). Constitutional: Patient is adequately groomed with no evidence of malnutrition  Mental Status: The patient is oriented to time, place and person. The patient's mood and affect are appropriate. Right shoulder has active elevation to about 50°,  internal rotation to the belly and external rotation to -10. Neurologic and vascular exams are normal.    Left shoulder is elevation to 50° internal rotation to the belly and extra rotation to about 0. Neurologic and vascular exams are normal.    I reviewed her x-rays of both shoulders right has tremendously severe end-stage glenohumeral arthritis and left as severe glenohumeral arthritis as well. Assessment: Endstage arthritic change bilateral shoulders. Plan: If she chooses to take glucosamine chondroitin and they provide relief that's great. If she does not get relief that's okay. She is not interested in more cortisone is as not been helpful and I agree. From a therapy standpoint, I think pool therapy as her best bet. I do not believe physical therapy will markedly alter her outcome. This note was created using voice recognition software.  It has been proofread, but occasionally errors remain. Please disregard these errors. They will be corrected as they are noted.

## 2019-04-15 NOTE — ED NOTES
Meal Tray ordered. Provider gave ok to order pt general diet.      Herberth Comer, RN  04/15/19 6672

## 2019-04-16 ENCOUNTER — APPOINTMENT (OUTPATIENT)
Dept: GENERAL RADIOLOGY | Age: 84
DRG: 482 | End: 2019-04-16
Payer: MEDICARE

## 2019-04-16 ENCOUNTER — ANESTHESIA EVENT (OUTPATIENT)
Dept: OPERATING ROOM | Age: 84
DRG: 482 | End: 2019-04-16
Payer: MEDICARE

## 2019-04-16 ENCOUNTER — ANESTHESIA (OUTPATIENT)
Dept: OPERATING ROOM | Age: 84
DRG: 482 | End: 2019-04-16
Payer: MEDICARE

## 2019-04-16 VITALS
RESPIRATION RATE: 13 BRPM | DIASTOLIC BLOOD PRESSURE: 60 MMHG | SYSTOLIC BLOOD PRESSURE: 125 MMHG | TEMPERATURE: 99 F | OXYGEN SATURATION: 98 %

## 2019-04-16 LAB
ANION GAP SERPL CALCULATED.3IONS-SCNC: 12 MMOL/L (ref 3–16)
BASOPHILS ABSOLUTE: 0 K/UL (ref 0–0.2)
BASOPHILS RELATIVE PERCENT: 0.3 %
BUN BLDV-MCNC: 8 MG/DL (ref 7–20)
CALCIUM SERPL-MCNC: 8.6 MG/DL (ref 8.3–10.6)
CHLORIDE BLD-SCNC: 102 MMOL/L (ref 99–110)
CO2: 24 MMOL/L (ref 21–32)
CREAT SERPL-MCNC: 0.6 MG/DL (ref 0.6–1.2)
EOSINOPHILS ABSOLUTE: 0.1 K/UL (ref 0–0.6)
EOSINOPHILS RELATIVE PERCENT: 1.4 %
GFR AFRICAN AMERICAN: >60
GFR NON-AFRICAN AMERICAN: >60
GLUCOSE BLD-MCNC: 111 MG/DL (ref 70–99)
HCT VFR BLD CALC: 37.7 % (ref 36–48)
HEMOGLOBIN: 12.5 G/DL (ref 12–16)
INR BLD: 1.12 (ref 0.86–1.14)
LYMPHOCYTES ABSOLUTE: 1 K/UL (ref 1–5.1)
LYMPHOCYTES RELATIVE PERCENT: 9.8 %
MCH RBC QN AUTO: 28.3 PG (ref 26–34)
MCHC RBC AUTO-ENTMCNC: 33.3 G/DL (ref 31–36)
MCV RBC AUTO: 85.1 FL (ref 80–100)
MONOCYTES ABSOLUTE: 0.5 K/UL (ref 0–1.3)
MONOCYTES RELATIVE PERCENT: 4.4 %
NEUTROPHILS ABSOLUTE: 8.7 K/UL (ref 1.7–7.7)
NEUTROPHILS RELATIVE PERCENT: 84.1 %
PDW BLD-RTO: 13.7 % (ref 12.4–15.4)
PLATELET # BLD: 166 K/UL (ref 135–450)
PMV BLD AUTO: 7.6 FL (ref 5–10.5)
POTASSIUM REFLEX MAGNESIUM: 4.2 MMOL/L (ref 3.5–5.1)
PROTHROMBIN TIME: 12.8 SEC (ref 9.8–13)
RBC # BLD: 4.43 M/UL (ref 4–5.2)
SODIUM BLD-SCNC: 138 MMOL/L (ref 136–145)
WBC # BLD: 10.3 K/UL (ref 4–11)

## 2019-04-16 PROCEDURE — 0QS734Z REPOSITION LEFT UPPER FEMUR WITH INTERNAL FIXATION DEVICE, PERCUTANEOUS APPROACH: ICD-10-PCS | Performed by: ORTHOPAEDIC SURGERY

## 2019-04-16 PROCEDURE — 97162 PT EVAL MOD COMPLEX 30 MIN: CPT

## 2019-04-16 PROCEDURE — 85025 COMPLETE CBC W/AUTO DIFF WBC: CPT

## 2019-04-16 PROCEDURE — 7100000000 HC PACU RECOVERY - FIRST 15 MIN: Performed by: ORTHOPAEDIC SURGERY

## 2019-04-16 PROCEDURE — 3700000001 HC ADD 15 MINUTES (ANESTHESIA): Performed by: ORTHOPAEDIC SURGERY

## 2019-04-16 PROCEDURE — 80048 BASIC METABOLIC PNL TOTAL CA: CPT

## 2019-04-16 PROCEDURE — 6370000000 HC RX 637 (ALT 250 FOR IP): Performed by: NURSE PRACTITIONER

## 2019-04-16 PROCEDURE — 99222 1ST HOSP IP/OBS MODERATE 55: CPT | Performed by: ORTHOPAEDIC SURGERY

## 2019-04-16 PROCEDURE — 3600000004 HC SURGERY LEVEL 4 BASE: Performed by: ORTHOPAEDIC SURGERY

## 2019-04-16 PROCEDURE — 6360000002 HC RX W HCPCS: Performed by: ORTHOPAEDIC SURGERY

## 2019-04-16 PROCEDURE — 2500000003 HC RX 250 WO HCPCS: Performed by: NURSE ANESTHETIST, CERTIFIED REGISTERED

## 2019-04-16 PROCEDURE — 6360000002 HC RX W HCPCS: Performed by: NURSE ANESTHETIST, CERTIFIED REGISTERED

## 2019-04-16 PROCEDURE — C1713 ANCHOR/SCREW BN/BN,TIS/BN: HCPCS | Performed by: ORTHOPAEDIC SURGERY

## 2019-04-16 PROCEDURE — 2709999900 HC NON-CHARGEABLE SUPPLY: Performed by: ORTHOPAEDIC SURGERY

## 2019-04-16 PROCEDURE — 27235 TREAT THIGH FRACTURE: CPT | Performed by: ORTHOPAEDIC SURGERY

## 2019-04-16 PROCEDURE — 3600000014 HC SURGERY LEVEL 4 ADDTL 15MIN: Performed by: ORTHOPAEDIC SURGERY

## 2019-04-16 PROCEDURE — 2500000003 HC RX 250 WO HCPCS: Performed by: ORTHOPAEDIC SURGERY

## 2019-04-16 PROCEDURE — 2700000000 HC OXYGEN THERAPY PER DAY

## 2019-04-16 PROCEDURE — 97530 THERAPEUTIC ACTIVITIES: CPT

## 2019-04-16 PROCEDURE — 36415 COLL VENOUS BLD VENIPUNCTURE: CPT

## 2019-04-16 PROCEDURE — 2720000010 HC SURG SUPPLY STERILE: Performed by: ORTHOPAEDIC SURGERY

## 2019-04-16 PROCEDURE — 2580000003 HC RX 258: Performed by: ORTHOPAEDIC SURGERY

## 2019-04-16 PROCEDURE — 6370000000 HC RX 637 (ALT 250 FOR IP): Performed by: ORTHOPAEDIC SURGERY

## 2019-04-16 PROCEDURE — 3209999900 FLUORO FOR SURGICAL PROCEDURES

## 2019-04-16 PROCEDURE — 2580000003 HC RX 258: Performed by: NURSE ANESTHETIST, CERTIFIED REGISTERED

## 2019-04-16 PROCEDURE — 7100000001 HC PACU RECOVERY - ADDTL 15 MIN: Performed by: ORTHOPAEDIC SURGERY

## 2019-04-16 PROCEDURE — 85610 PROTHROMBIN TIME: CPT

## 2019-04-16 PROCEDURE — 94664 DEMO&/EVAL PT USE INHALER: CPT

## 2019-04-16 PROCEDURE — 2060000000 HC ICU INTERMEDIATE R&B

## 2019-04-16 PROCEDURE — C1769 GUIDE WIRE: HCPCS | Performed by: ORTHOPAEDIC SURGERY

## 2019-04-16 PROCEDURE — 73502 X-RAY EXAM HIP UNI 2-3 VIEWS: CPT

## 2019-04-16 PROCEDURE — 97166 OT EVAL MOD COMPLEX 45 MIN: CPT

## 2019-04-16 PROCEDURE — 3700000000 HC ANESTHESIA ATTENDED CARE: Performed by: ORTHOPAEDIC SURGERY

## 2019-04-16 DEVICE — CANNULATED SCREW
Type: IMPLANTABLE DEVICE | Site: HIP | Status: FUNCTIONAL
Brand: ASNIS

## 2019-04-16 DEVICE — WASHER: Type: IMPLANTABLE DEVICE | Site: HIP | Status: FUNCTIONAL

## 2019-04-16 RX ORDER — ONDANSETRON 2 MG/ML
4 INJECTION INTRAMUSCULAR; INTRAVENOUS
Status: DISCONTINUED | OUTPATIENT
Start: 2019-04-16 | End: 2019-04-16 | Stop reason: HOSPADM

## 2019-04-16 RX ORDER — ASPIRIN 325 MG
325 TABLET, DELAYED RELEASE (ENTERIC COATED) ORAL DAILY
Qty: 30 TABLET | Refills: 0 | Status: ON HOLD | OUTPATIENT
Start: 2019-04-16 | End: 2019-10-10 | Stop reason: ALTCHOICE

## 2019-04-16 RX ORDER — BUPIVACAINE HYDROCHLORIDE 5 MG/ML
INJECTION, SOLUTION EPIDURAL; INTRACAUDAL
Status: COMPLETED | OUTPATIENT
Start: 2019-04-16 | End: 2019-04-16

## 2019-04-16 RX ORDER — SUCCINYLCHOLINE/SOD CL,ISO/PF 200MG/10ML
SYRINGE (ML) INTRAVENOUS PRN
Status: DISCONTINUED | OUTPATIENT
Start: 2019-04-16 | End: 2019-04-16 | Stop reason: SDUPTHER

## 2019-04-16 RX ORDER — MORPHINE SULFATE 2 MG/ML
2 INJECTION, SOLUTION INTRAMUSCULAR; INTRAVENOUS EVERY 5 MIN PRN
Status: DISCONTINUED | OUTPATIENT
Start: 2019-04-16 | End: 2019-04-16 | Stop reason: HOSPADM

## 2019-04-16 RX ORDER — FENTANYL CITRATE 50 UG/ML
25 INJECTION, SOLUTION INTRAMUSCULAR; INTRAVENOUS EVERY 5 MIN PRN
Status: DISCONTINUED | OUTPATIENT
Start: 2019-04-16 | End: 2019-04-16 | Stop reason: HOSPADM

## 2019-04-16 RX ORDER — OXYCODONE HYDROCHLORIDE 5 MG/1
5 TABLET ORAL PRN
Status: DISCONTINUED | OUTPATIENT
Start: 2019-04-16 | End: 2019-04-16 | Stop reason: HOSPADM

## 2019-04-16 RX ORDER — CLINDAMYCIN PHOSPHATE 900 MG/50ML
900 INJECTION INTRAVENOUS EVERY 8 HOURS
Status: COMPLETED | OUTPATIENT
Start: 2019-04-16 | End: 2019-04-17

## 2019-04-16 RX ORDER — DOCUSATE SODIUM 100 MG/1
100 CAPSULE, LIQUID FILLED ORAL 2 TIMES DAILY
Status: DISCONTINUED | OUTPATIENT
Start: 2019-04-16 | End: 2019-04-18 | Stop reason: HOSPADM

## 2019-04-16 RX ORDER — DEXAMETHASONE SODIUM PHOSPHATE 4 MG/ML
INJECTION, SOLUTION INTRA-ARTICULAR; INTRALESIONAL; INTRAMUSCULAR; INTRAVENOUS; SOFT TISSUE PRN
Status: DISCONTINUED | OUTPATIENT
Start: 2019-04-16 | End: 2019-04-16 | Stop reason: SDUPTHER

## 2019-04-16 RX ORDER — HYDROCODONE BITARTRATE AND ACETAMINOPHEN 5; 325 MG/1; MG/1
1 TABLET ORAL EVERY 6 HOURS PRN
Status: DISCONTINUED | OUTPATIENT
Start: 2019-04-16 | End: 2019-04-18 | Stop reason: HOSPADM

## 2019-04-16 RX ORDER — LIDOCAINE HYDROCHLORIDE 20 MG/ML
INJECTION, SOLUTION EPIDURAL; INFILTRATION; INTRACAUDAL; PERINEURAL PRN
Status: DISCONTINUED | OUTPATIENT
Start: 2019-04-16 | End: 2019-04-16 | Stop reason: SDUPTHER

## 2019-04-16 RX ORDER — MORPHINE SULFATE 2 MG/ML
1 INJECTION, SOLUTION INTRAMUSCULAR; INTRAVENOUS EVERY 5 MIN PRN
Status: DISCONTINUED | OUTPATIENT
Start: 2019-04-16 | End: 2019-04-16 | Stop reason: HOSPADM

## 2019-04-16 RX ORDER — MEPERIDINE HYDROCHLORIDE 25 MG/ML
12.5 INJECTION INTRAMUSCULAR; INTRAVENOUS; SUBCUTANEOUS EVERY 5 MIN PRN
Status: DISCONTINUED | OUTPATIENT
Start: 2019-04-16 | End: 2019-04-16 | Stop reason: HOSPADM

## 2019-04-16 RX ORDER — ONDANSETRON 2 MG/ML
4 INJECTION INTRAMUSCULAR; INTRAVENOUS EVERY 6 HOURS PRN
Status: DISCONTINUED | OUTPATIENT
Start: 2019-04-16 | End: 2019-04-18 | Stop reason: HOSPADM

## 2019-04-16 RX ORDER — SODIUM CHLORIDE 0.9 % (FLUSH) 0.9 %
10 SYRINGE (ML) INJECTION EVERY 12 HOURS SCHEDULED
Status: DISCONTINUED | OUTPATIENT
Start: 2019-04-16 | End: 2019-04-18 | Stop reason: HOSPADM

## 2019-04-16 RX ORDER — PROPOFOL 10 MG/ML
INJECTION, EMULSION INTRAVENOUS PRN
Status: DISCONTINUED | OUTPATIENT
Start: 2019-04-16 | End: 2019-04-16 | Stop reason: SDUPTHER

## 2019-04-16 RX ORDER — HYDROCODONE BITARTRATE AND ACETAMINOPHEN 5; 325 MG/1; MG/1
1 TABLET ORAL EVERY 6 HOURS PRN
Qty: 30 TABLET | Refills: 0 | Status: SHIPPED | OUTPATIENT
Start: 2019-04-16 | End: 2019-04-23

## 2019-04-16 RX ORDER — SODIUM CHLORIDE 9 MG/ML
INJECTION, SOLUTION INTRAVENOUS CONTINUOUS PRN
Status: DISCONTINUED | OUTPATIENT
Start: 2019-04-16 | End: 2019-04-16 | Stop reason: SDUPTHER

## 2019-04-16 RX ORDER — MAGNESIUM HYDROXIDE 1200 MG/15ML
LIQUID ORAL CONTINUOUS PRN
Status: COMPLETED | OUTPATIENT
Start: 2019-04-16 | End: 2019-04-16

## 2019-04-16 RX ORDER — SODIUM CHLORIDE 450 MG/100ML
INJECTION, SOLUTION INTRAVENOUS CONTINUOUS
Status: DISCONTINUED | OUTPATIENT
Start: 2019-04-16 | End: 2019-04-18 | Stop reason: HOSPADM

## 2019-04-16 RX ORDER — OXYCODONE HYDROCHLORIDE 5 MG/1
10 TABLET ORAL PRN
Status: DISCONTINUED | OUTPATIENT
Start: 2019-04-16 | End: 2019-04-16 | Stop reason: HOSPADM

## 2019-04-16 RX ORDER — FENTANYL CITRATE 50 UG/ML
50 INJECTION, SOLUTION INTRAMUSCULAR; INTRAVENOUS EVERY 5 MIN PRN
Status: DISCONTINUED | OUTPATIENT
Start: 2019-04-16 | End: 2019-04-16 | Stop reason: HOSPADM

## 2019-04-16 RX ORDER — FENTANYL CITRATE 50 UG/ML
INJECTION, SOLUTION INTRAMUSCULAR; INTRAVENOUS PRN
Status: DISCONTINUED | OUTPATIENT
Start: 2019-04-16 | End: 2019-04-16 | Stop reason: SDUPTHER

## 2019-04-16 RX ORDER — PHENYLEPHRINE HCL IN 0.9% NACL 1 MG/10 ML
SYRINGE (ML) INTRAVENOUS PRN
Status: DISCONTINUED | OUTPATIENT
Start: 2019-04-16 | End: 2019-04-16 | Stop reason: SDUPTHER

## 2019-04-16 RX ORDER — ROCURONIUM BROMIDE 10 MG/ML
INJECTION, SOLUTION INTRAVENOUS PRN
Status: DISCONTINUED | OUTPATIENT
Start: 2019-04-16 | End: 2019-04-16 | Stop reason: SDUPTHER

## 2019-04-16 RX ORDER — SODIUM CHLORIDE 0.9 % (FLUSH) 0.9 %
10 SYRINGE (ML) INJECTION PRN
Status: DISCONTINUED | OUTPATIENT
Start: 2019-04-16 | End: 2019-04-18 | Stop reason: HOSPADM

## 2019-04-16 RX ORDER — ONDANSETRON 2 MG/ML
INJECTION INTRAMUSCULAR; INTRAVENOUS PRN
Status: DISCONTINUED | OUTPATIENT
Start: 2019-04-16 | End: 2019-04-16 | Stop reason: SDUPTHER

## 2019-04-16 RX ADMIN — LIDOCAINE HYDROCHLORIDE 100 MG: 20 INJECTION, SOLUTION EPIDURAL; INFILTRATION; INTRACAUDAL; PERINEURAL at 07:28

## 2019-04-16 RX ADMIN — CLINDAMYCIN PHOSPHATE 900 MG: 900 INJECTION, SOLUTION INTRAVENOUS at 16:00

## 2019-04-16 RX ADMIN — Medication 10 ML: at 21:00

## 2019-04-16 RX ADMIN — ROCURONIUM BROMIDE 30 MG: 10 INJECTION INTRAVENOUS at 07:41

## 2019-04-16 RX ADMIN — ENOXAPARIN SODIUM 40 MG: 40 INJECTION SUBCUTANEOUS at 21:00

## 2019-04-16 RX ADMIN — Medication 100 MG: at 07:30

## 2019-04-16 RX ADMIN — HYDROCODONE BITARTRATE AND ACETAMINOPHEN 1 TABLET: 5; 325 TABLET ORAL at 10:49

## 2019-04-16 RX ADMIN — SODIUM CHLORIDE: 9 INJECTION, SOLUTION INTRAVENOUS at 07:26

## 2019-04-16 RX ADMIN — DOCUSATE SODIUM 100 MG: 100 CAPSULE, LIQUID FILLED ORAL at 21:00

## 2019-04-16 RX ADMIN — FENTANYL CITRATE 100 MCG: 50 INJECTION INTRAMUSCULAR; INTRAVENOUS at 07:28

## 2019-04-16 RX ADMIN — ONDANSETRON 4 MG: 2 INJECTION INTRAMUSCULAR; INTRAVENOUS at 08:09

## 2019-04-16 RX ADMIN — CLINDAMYCIN PHOSPHATE 900 MG: 18 INJECTION, SOLUTION INTRAMUSCULAR; INTRAVENOUS at 07:25

## 2019-04-16 RX ADMIN — SUGAMMADEX 200 MG: 100 INJECTION, SOLUTION INTRAVENOUS at 08:10

## 2019-04-16 RX ADMIN — DEXAMETHASONE SODIUM PHOSPHATE 4 MG: 4 INJECTION, SOLUTION INTRAMUSCULAR; INTRAVENOUS at 07:36

## 2019-04-16 RX ADMIN — SODIUM CHLORIDE: 4.5 INJECTION, SOLUTION INTRAVENOUS at 10:49

## 2019-04-16 RX ADMIN — DOCUSATE SODIUM 100 MG: 100 CAPSULE, LIQUID FILLED ORAL at 10:49

## 2019-04-16 RX ADMIN — PROPOFOL 100 MG: 10 INJECTION, EMULSION INTRAVENOUS at 07:30

## 2019-04-16 RX ADMIN — FENTANYL CITRATE 50 MCG: 50 INJECTION INTRAMUSCULAR; INTRAVENOUS at 08:14

## 2019-04-16 RX ADMIN — FENTANYL CITRATE 50 MCG: 50 INJECTION INTRAMUSCULAR; INTRAVENOUS at 08:24

## 2019-04-16 RX ADMIN — Medication 200 MCG: at 07:43

## 2019-04-16 ASSESSMENT — PAIN SCALES - GENERAL
PAINLEVEL_OUTOF10: 3
PAINLEVEL_OUTOF10: 5
PAINLEVEL_OUTOF10: 8
PAINLEVEL_OUTOF10: 3
PAINLEVEL_OUTOF10: 0
PAINLEVEL_OUTOF10: 3
PAINLEVEL_OUTOF10: 3
PAINLEVEL_OUTOF10: 0

## 2019-04-16 ASSESSMENT — PULMONARY FUNCTION TESTS
PIF_VALUE: 19
PIF_VALUE: 21
PIF_VALUE: 19
PIF_VALUE: 4
PIF_VALUE: 3
PIF_VALUE: 19
PIF_VALUE: 15
PIF_VALUE: 24
PIF_VALUE: 17
PIF_VALUE: 19
PIF_VALUE: 21
PIF_VALUE: 0
PIF_VALUE: 19
PIF_VALUE: 9
PIF_VALUE: 19
PIF_VALUE: 14
PIF_VALUE: 18
PIF_VALUE: 2
PIF_VALUE: 2
PIF_VALUE: 19
PIF_VALUE: 9
PIF_VALUE: 20
PIF_VALUE: 20
PIF_VALUE: 0
PIF_VALUE: 20
PIF_VALUE: 19
PIF_VALUE: 12
PIF_VALUE: 15
PIF_VALUE: 19
PIF_VALUE: 1
PIF_VALUE: 20
PIF_VALUE: 11
PIF_VALUE: 20
PIF_VALUE: 2
PIF_VALUE: 28
PIF_VALUE: 19
PIF_VALUE: 23
PIF_VALUE: 7
PIF_VALUE: 16
PIF_VALUE: 20
PIF_VALUE: 19
PIF_VALUE: 19
PIF_VALUE: 20
PIF_VALUE: 19
PIF_VALUE: 24
PIF_VALUE: 19
PIF_VALUE: 20
PIF_VALUE: 2
PIF_VALUE: 18
PIF_VALUE: 2
PIF_VALUE: 19
PIF_VALUE: 11

## 2019-04-16 ASSESSMENT — PAIN DESCRIPTION - PAIN TYPE
TYPE: SURGICAL PAIN

## 2019-04-16 ASSESSMENT — PAIN DESCRIPTION - DESCRIPTORS
DESCRIPTORS: BURNING;SHARP
DESCRIPTORS: BURNING;STABBING
DESCRIPTORS: BURNING;SHARP
DESCRIPTORS: BURNING;STABBING
DESCRIPTORS: ACHING
DESCRIPTORS: ACHING

## 2019-04-16 ASSESSMENT — PAIN DESCRIPTION - ORIENTATION
ORIENTATION: LEFT

## 2019-04-16 ASSESSMENT — PAIN DESCRIPTION - PROGRESSION
CLINICAL_PROGRESSION: NOT CHANGED
CLINICAL_PROGRESSION: GRADUALLY IMPROVING
CLINICAL_PROGRESSION: NOT CHANGED

## 2019-04-16 ASSESSMENT — PAIN - FUNCTIONAL ASSESSMENT: PAIN_FUNCTIONAL_ASSESSMENT: 0-10

## 2019-04-16 ASSESSMENT — PAIN DESCRIPTION - FREQUENCY
FREQUENCY: CONTINUOUS

## 2019-04-16 ASSESSMENT — PAIN DESCRIPTION - ONSET
ONSET: ON-GOING

## 2019-04-16 ASSESSMENT — PAIN DESCRIPTION - LOCATION
LOCATION: LEG
LOCATION: HIP
LOCATION: HIP
LOCATION: LEG
LOCATION: HIP;LEG
LOCATION: HIP

## 2019-04-16 NOTE — PROGRESS NOTES
Patient arrived to the floor alert and oriented. Iv fluids infusing at 75ml/hr. Four eyes assessment performed. Patient complained 10-10 . Our of 0-10. Patient has been oriented to the room and is on strict bed rest. Fall risk assessment completed. Fall precautions in place. Call light within reach. Pt educated on calling for assistance before getting up. Walkway free of clutter. Will continue to monitor.

## 2019-04-16 NOTE — CARE COORDINATION
CORINNE intern spoke with patient and family, present at bedside, regarding discharge plan. Patient is agreeable to a SNF stay. CORINNE intern provided patient and her  with [facility] Medicare SNF list. Patient and her  requested that referrals be made to [facility] and [facility] as they are close to home and they know people who have stayed there. Patient and  also requested a referral be made to [facility] -- SW explained that her insurance will not likely cover this. Patient and  verbalized understanding. CORINNE intern faxed referrals through [employer] to [facility] and [facility]. Electronically signed by Araceli Almonte on 4/16/2019 at [place] AM  Electronically signed by Shelva Lesches on 4/16/2019 at 11:27 AM    SW received call from Scott at [facility] -- they can accept patient. Electronically signed by Araceli Almonte on 4/16/2019 at 11:37 AM    CORINNE intern spoke with patient and her  about bed at [facility]. They are agreeable to [facility]. CORINNE intern spoke with Scott ([phone]) letting her know -- she will start pre-cert today.      Electronically signed by Araceli Almonte on 4/16/2019 at 11:58 AM

## 2019-04-16 NOTE — CARE COORDINATION
Skilled Bed available at [facility]. [facility] Liaison following ans will assist with discharge when D/C ready.     Sanju [facility] [phone]

## 2019-04-16 NOTE — PROGRESS NOTES
Patient A&O. Tolerating PO. Call light within reach. Family at bedside. Will continue to monitor and reassess.  Electronically signed by Bhavik Ty RN on 4/16/2019

## 2019-04-16 NOTE — ANESTHESIA PRE PROCEDURE
Department of Anesthesiology  Preprocedure Note       Name:  Azucena Hinojosa   Age:  80 y.o.  :  1935                                          MRN:  [id number]         Date:  2019      Surgeon: Samuel Pichardo):  David Feldman MD    Procedure: LEFT HIP PINNING (Left )    Medications prior to admission:   Prior to Admission medications    Medication Sig Start Date End Date Taking?  Authorizing Provider   CRANBERRY PO Take by mouth daily   Yes Historical Provider, MD   oxybutynin (DITROPAN-XL) 10 MG extended release tablet Take 10 mg by mouth daily   Yes Historical Provider, MD   vitamin D (CHOLECALCIFEROL) 1000 UNIT TABS tablet Take 1,000 Units by mouth daily   Yes Historical Provider, MD   trimethoprim (TRIMPEX) 100 MG tablet Take 100 mg by mouth daily    Yes Historical Provider, MD   conjugated estrogens (PREMARIN) 0.625 MG/GM vaginal cream Place 0.5 g vaginally Twice a Week    Yes Historical Provider, MD   triamcinolone (KENALOG) 0.1 % ointment Apply topically 2 times daily as needed    Yes Historical Provider, MD   atorvastatin (LIPITOR) 40 MG tablet Take 40 mg by mouth daily  10/12/16  Yes Historical Provider, MD   levothyroxine (SYNTHROID) 75 MCG tablet Take 75 mcg by mouth Daily  10/12/16  Yes Historical Provider, MD   bimatoprost (LUMIGAN) 0.01 % SOLN ophthalmic drops Place 1 drop into both eyes nightly    Yes Historical Provider, MD   brinzolamide-brimonidine 1-0.2 % SUSP Place 1 drop into both eyes 2 times daily    Yes Historical Provider, MD   nystatin 965673 UNIT/GM POWD Apply topically 2 times daily as needed    Yes Historical Provider, MD   omeprazole (PRILOSEC) 20 MG delayed release capsule Take 20 mg by mouth daily    Yes Historical Provider, MD       Current medications:    Current Facility-Administered Medications   Medication Dose Route Frequency Provider Last Rate Last Dose    morphine (PF) injection 4 mg  4 mg Intravenous [facility] [place]   Stopped at 04/15/19 6929    clindamycin (CLEOCIN) 900 mg in dextrose 5 % 50 mL IVPB  900 mg Intravenous On Call to 6135 Jigar Robles MD        sodium chloride flush 0.9 % injection 10 mL  10 mL Intravenous 2 times per day Florace Buck, APRN - CNP   10 mL at 04/15/19 2145    sodium chloride flush 0.9 % injection 10 mL  10 mL Intravenous PRN Luci Hissett, APRN - CNP        acetaminophen (TYLENOL) tablet 650 mg  650 mg Oral Q4H PRN Luci Hissett, APRN - CNP        ondansetron (ZOFRAN) injection 4 mg  4 mg Intravenous Q6H PRN Luci Hissett, APRN - CNP        enoxaparin (LOVENOX) injection 40 mg  40 mg Subcutaneous Daily Luci Hissett, APRN - CNP        0.9 % sodium chloride infusion   Intravenous Continuous Luci Hissett, APRN - CNP 75 mL/hr at 04/15/19 2200      morphine (PF) injection 2 mg  2 mg Intravenous Q4H PRN Luci Hissett, APRN - CNP   2 mg at 04/15/19 2345       Allergies:     Allergies   Allergen Reactions    Amoxicillin      diarrhea    Penicillins Rash       Problem List:    Patient Active Problem List   Diagnosis Code    Osteoarthritis of right knee M17.11    Closed left hip fracture, initial encounter ([facility] [address] S72.002A    Mixed hyperlipidemia E78.2    Acquired hypothyroidism E03.9    Other specified glaucoma H40.89    Irritable bowel syndrome with diarrhea K58.0       Past Medical History:        Diagnosis Date    Cancer ([facility] [address]     SKIN    Glaucoma     Hyperlipidemia     IBS (irritable bowel syndrome)     Seasonal allergies     Thyroid disease     Weight loss        Past Surgical History:        Procedure Laterality Date    COLONOSCOPY  12/04/2018    Silver-normal    CYST REMOVAL      EYE SURGERY      CATARACT    MA COLONOSCOPY FLX DX W/COLLJ SPEC WHEN PFRMD N/A 12/4/2018    COLONOSCOPY DIAGNOSTIC OR SCREENING performed by Albert Ross MD at [address] [facility]  10/18/2016    Dr Pedro Phan History:    Social History     Tobacco Use    Smoking status: Never Smoker    Smokeless tobacco: Never Used   Substance Use Topics    Alcohol use: No                                Counseling given: Not Answered      Vital Signs (Current):   Vitals:    04/16/19 0000 04/16/19 0154 04/16/19 0519 04/16/19 0640   BP:    (!) 148/83   Pulse: 70   80   Resp:    14   Temp:    97.8 °F (36.6 °C)   TempSrc:       SpO2:    98%   Weight:  164 lb 7.4 oz (74.6 kg) 164 lb 7.4 oz (74.6 kg)    Height:  5' 2.99\" (1.6 m)                                                BP Readings from Last 3 Encounters:   04/16/19 (!) 148/83   04/15/19 (!) 155/76   12/04/18 106/61       NPO Status: Time of last liquid consumption: 2156                        Time of last solid consumption: 1500                        Date of last liquid consumption: 04/15/19                        Date of last solid food consumption: 04/15/19    BMI:   Wt Readings from Last 3 Encounters:   04/16/19 164 lb 7.4 oz (74.6 kg)   04/15/19 163 lb (73.9 kg)   12/04/18 157 lb 2 oz (71.3 kg)     Body mass index is 29.14 kg/m². CBC:   Lab Results   Component Value Date    WBC 10.3 04/16/2019    RBC 4.43 04/16/2019    HGB 12.5 04/16/2019    HCT 37.7 04/16/2019    MCV 85.1 04/16/2019    RDW 13.7 04/16/2019     04/16/2019       CMP:   Lab Results   Component Value Date     04/16/2019    K 4.2 04/16/2019     04/16/2019    CO2 24 04/16/2019    BUN 8 04/16/2019    CREATININE 0.6 04/16/2019    GFRAA >60 04/16/2019    AGRATIO 1.3 09/30/2018    LABGLOM >60 04/16/2019    GLUCOSE 111 04/16/2019    PROT 7.2 09/30/2018    CALCIUM 8.6 04/16/2019    BILITOT 0.4 09/30/2018    ALKPHOS 97 09/30/2018    AST 20 09/30/2018    ALT 9 09/30/2018       POC Tests: No results for input(s): POCGLU, POCNA, POCK, POCCL, POCBUN, POCHEMO, POCHCT in the last 72 hours.     Coags:   Lab Results   Component Value Date    PROTIME 12.8 04/16/2019    INR 1.12 04/16/2019    APTT 32.5 04/15/2019       HCG (If Applicable): No results found for: PREGTESTUR, PREGSERUM, HCG, HCGQUANT ABGs: No results found for: PHART, PO2ART, IIZ0JMT, NGE7CUX, BEART, C2ZSGMIT     Type & Screen (If Applicable):  No results found for: LABABO, [address]    Anesthesia Evaluation  Patient summary reviewed and Nursing notes reviewed  Airway: Mallampati: II  TM distance: >3 FB   Neck ROM: full  Mouth opening: > = 3 FB Dental:          Pulmonary:Negative Pulmonary ROS breath sounds clear to auscultation                             Cardiovascular:Negative CV ROS  Exercise tolerance: good (>4 METS),           Rhythm: regular                      Neuro/Psych:   Negative Neuro/Psych ROS              GI/Hepatic/Renal:        (-) hiatal hernia, GERD, PUD, hepatitis, liver disease, no renal disease, bowel prep and no morbid obesity      ROS comment: IBS. Endo/Other:    (+) hypothyroidism: arthritis: OA., no malignancy/cancer. (-) diabetes mellitus, hyperthyroidism, blood dyscrasia, no electrolyte abnormalities, no malignancy/cancer                ROS comment: Left hip fracture Abdominal:           Vascular: negative vascular ROS. Anesthesia Plan      general     ASA 3     (Spoke with patient and  Juan A Otero) and her son(Duglas))  Induction: intravenous. MIPS: Postoperative opioids intended and Prophylactic antiemetics administered. Anesthetic plan and risks discussed with patient, spouse and child/children. Plan discussed with CRNA. Nina Turk MD   4/16/2019    This pre-anesthesia assessment may be used as a history and physical.    DOS STAFF ADDENDUM:    Pt seen and examined, chart reviewed (including anesthesia, drug and allergy history). No interval changes to history and physical examination. Anesthetic plan, risks, benefits, alternatives, and personnel involved discussed with patient. Patient verbalized an understanding and agrees to proceed.       Nina Turk MD  April 16, 2019  6:54 AM

## 2019-04-16 NOTE — PROGRESS NOTES
[address] [facility] Orthopedic Surgery   Progress Note      S/P :  SUBJECTIVE  Alert and oriented. Family at bedside. . Pain is   described in left hip and with the intensity of mild. Pain is described as aching. OBJECTIVE              Physical                      VITALS:  BP (!) 153/74   Pulse 75   Temp 98.3 °F (36.8 °C) (Oral)   Resp 18   Ht 5' 2.99\" (1.6 m)   Wt 164 lb 7.4 oz (74.6 kg)   SpO2 95%   BMI 29.14 kg/m²                     MUSCULOSKELETAL:  left foot NVI. Wiggles toes to command. Pedal pulses are palpable. NEUROLOGIC:                                  Sensory:  Touch:  Left Lower Extremity:  normal                                                 Surgical wound appears clean and dry left hip with Mepilex dressing. Ice applied.      Data       CBC:   Lab Results   Component Value Date    WBC 10.3 04/16/2019    RBC 4.43 04/16/2019    HGB 12.5 04/16/2019    HCT 37.7 04/16/2019    MCV 85.1 04/16/2019    MCH 28.3 04/16/2019    MCHC 33.3 04/16/2019    RDW 13.7 04/16/2019     04/16/2019    MPV 7.6 04/16/2019        WBC:    Lab Results   Component Value Date    WBC 10.3 04/16/2019        Hemoglobin/Hematocrit:    Lab Results   Component Value Date    HGB 12.5 04/16/2019    HCT 37.7 04/16/2019        PT/INR:    Lab Results   Component Value Date    PROTIME 12.8 04/16/2019    INR 1.12 04/16/2019              Current Inpatient Medications             Current Facility-Administered Medications: 0.45 % sodium chloride infusion, , Intravenous, Continuous  sodium chloride flush 0.9 % injection 10 mL, 10 mL, Intravenous, 2 times per day  sodium chloride flush 0.9 % injection 10 mL, 10 mL, Intravenous, PRN  docusate sodium (COLACE) capsule 100 mg, 100 mg, Oral, BID  ondansetron (ZOFRAN) injection 4 mg, 4 mg, Intravenous, Q6H PRN  enoxaparin (LOVENOX) injection 40 mg, 40 mg, Subcutaneous, Nightly  clindamycin (CLEOCIN) 900 mg in dextrose 5 % 50 mL IVPB, 900 mg, Intravenous, Q8H  HYDROcodone-acetaminophen (NORCO) 5-325 MG per tablet 1 tablet, 1 tablet, Oral, Q6H PRN  morphine (PF) injection 4 mg, 4 mg, Intravenous, Once  acetaminophen (TYLENOL) tablet 650 mg, 650 mg, Oral, Q4H PRN  morphine (PF) injection 2 mg, 2 mg, Intravenous, Q4H PRN    ASSESSMENT AND PLAN      Left femoral neck fx, post hip pinning today per DR Lisa Goodson, Stable exam  DVT prophylaxis ordered, Lovenox as inpt then ASA daily for 30 days as ordered. PT OT for ADL's and ambulation as tolerated, 25% WB left leg  SS for DC planning, ECF needed.    IV or PO pain med as ordered    Sudeep Cade  4/16/2019  10:57 AM

## 2019-04-16 NOTE — PROGRESS NOTES
Pt returned to floor from PACU at 0945 via bed. Pt re-oriented to room, call light, policies and procedures, the menu and ordering. Call light within reach. Bed in lowest position, bed alarm on, and wheels locked. Pt verbalized understanding. No complaints, questions or concerns at this time.   Electronically signed by Norm Sellers RN on 4/16/2019

## 2019-04-16 NOTE — PROGRESS NOTES
Physical Therapy    Facility/Department: [address] ORTHOPEDICS  Initial Assessment    NAME: Bridget Saldana  : 1935  MRN: [id number]    Date of Service: 2019   -will need continued PT OT and nursing care in skilled setting prior to home with family assist and home PT    Discharge Recommendations:  Bridget Saldana scored a 9/24 on the AM-PAC short mobility form. Current research shows that an AM-PAC score of 17 or less is typically not associated with a discharge to the patient's home setting. Based on the patients AM-PAC score and their current functional mobility deficits, it is recommended that the patient have 3-5 sessions per week of Physical Therapy at d/c to increase the patients independence. Assessment   Body structures, Functions, Activity limitations: Decreased functional mobility ; Decreased ADL status; Decreased balance  Prognosis: Good  Decision Making: Medium Complexity  REQUIRES PT FOLLOW UP: Yes  Activity Tolerance  Activity Tolerance: Patient Tolerated treatment well       Patient Diagnosis(es): The encounter diagnosis was Closed fracture of left hip, initial encounter ([facility] [address] has a past medical history of Cancer ([facility] [address] Glaucoma, Hyperlipidemia, IBS (irritable bowel syndrome), Seasonal allergies, Thyroid disease, and Weight loss. has a past surgical history that includes eye surgery; cyst removal; Upper gastrointestinal endoscopy (10/18/2016); Colonoscopy (2018); and pr colonoscopy flx dx w/collj spec when pfrmd (N/A, 2018).     Restrictions  Restrictions/Precautions  Restrictions/Precautions: Weight Bearing, Fall Risk  Lower Extremity Weight Bearing Restrictions  Left Lower Extremity Weight Bearing: Partial Weight Bearing  Partial Weight Bearing Percentage Or Pounds: 25%  Vision/Hearing  Vision: Impaired  Vision Exceptions: Wears glasses for reading  Hearing: Exceptions to [facility]  Hearing Exceptions: Hard of hearing/hearing concerns     Subjective  General  Chart Reviewed: PWB on left )  Exercises  Ankle Pumps: 30 per hour in easy pace  Comments: encouraged practice with the spirometer as instructed by RT     Plan   Plan  Times per week: qd while on acute for several days   Current Treatment Recommendations: Functional Mobility Training, Transfer Training, Modalities, Positioning, Patient/Caregiver Education & Training, ADL/Self-care Training  Safety Devices  Type of devices:  All fall risk precautions in place, Call light within reach, Chair alarm in place, Left in chair, Nurse notified(Denise)    AM-PAC Score  AM-PAC Inpatient Mobility Raw Score : 9  AM-PAC Inpatient T-Scale Score : 30.55  Mobility Inpatient CMS 0-100% Score: 81.38  Mobility Inpatient CMS G-Code Modifier : CM    Goals  Short term goals  Time Frame for Short term goals: 1-2 days   Short term goal 1: bed mobility at mod assist   Short term goal 2: transfers at mod assist of 2 with 25% PWB left LE  Short term goal 3: ambulation at mod assist of 2 in 25% PWB left LE for 5-8 feet  Patient Goals   Patient goals : pain relief and get home       Therapy Time   Individual Concurrent Group Co-treatment   Time In 1440         Time Out 1520         Minutes [address] [facility] Kamila Knox, PT

## 2019-04-16 NOTE — PROGRESS NOTES
4 Eyes Skin Assessment     The patient is being assess for  Admission    I agree that 2 RN's have performed a thorough Head to Toe Skin Assessment on the patient. ALL assessment sites listed below have been assessed. Areas assessed by both nurses: ***  [x]   Head, Face, and Ears   [x]   Shoulders, Back, and Chest  [x]   Arms, Elbows, and Hands   [x]   Coccyx, Sacrum, and IschIum  [x]   Legs, Feet, and Heels        Does the Patient have Skin Breakdown?   No         James Prevention initiated:  No   Wound Care Orders initiated:  No      [facility] nurse consulted for Pressure Injury (Stage 3,4, Unstageable, DTI, NWPT, and Complex wounds), New and Established Ostomies:  No      Nurse 1 eSignature: Electronically signed by Danielle Chiu RN on 4/16/19 at 5:04 AM    **SHARE this note so that the co-signing nurse is able to place an eSignature**    Nurse 2 eSignature: {Esignature:[id number]}

## 2019-04-16 NOTE — PROGRESS NOTES
Nicky calixto 16 [address] in place. Patient npo after midnight. Patient's call light within reach. Patient alert and oriented and verbalizes understanding that she is on strict bed rest. Bed alarm on. Lights turned off to promote rest. Fall risk assessment completed. Fall precautions in place. Will continue to monitor.

## 2019-04-16 NOTE — PROGRESS NOTES
Pharmacy Medication Reconciliation Note     List of medications patient is currently taking is complete. Source of information:   1. Patient   2. EMR    Notes regarding home medications:   1. Patient received all of her morning home medication doses before presenting to the ER.       [address] [facility] Carlos Eduardo, Pharmacy Intern  4/15/2019 8:07 PM

## 2019-04-16 NOTE — PLAN OF CARE
Will monitor skin and mucous members. Will turn patient every 2 hours, monitor for friction and sheering, and change dressings as needed. Will preform skin assessment every shift.

## 2019-04-16 NOTE — BRIEF OP NOTE
Brief Postoperative Note  ______________________________________________________________    Patient: Cecily Murrell  YOB: 1935  MRN: [id number]  Date of Procedure: 4/16/2019    Pre-Op Diagnosis: LEFT HIP FEMORAL NECK FX    Post-Op Diagnosis: Same       Procedure(s):  LEFT HIP PINNING    Anesthesia: General    Surgeon(s):  Franky Zee MD    Assistant: Mary Albarado    Estimated Blood Loss (mL): less than 50     Complications: None    Specimens:   * No specimens in log *    Implants:  Implant Name Type Inv.  Item Serial No.  Lot No. LRB No. Used   SCREW TIMOTHY 20MM THRD ASNIS  ST 6.5X85MM Screw/Plate/Nail/Thuan SCREW TIMOTHY 20MM THRD ASNIS  ST 6.5X85MM  MAURICIO: ORTHOPAEDICS  Left 1   SCREW TIMOTHY 20MM THRD ASNIS 6.5X80MM Screw/Plate/Nail/Thuan SCREW TIMOTHY 20MM THRD ASNIS 6.5X80MM  MAURICIO: ORTHOPAEDICS  Left 1   SCREW TIMOTHY 20MM THRD ASNIS 6.5X75MM Screw/Plate/Nail/Thuan SCREW TIMOTHY 20MM THRD ASNIS 6.5X75MM  MAURICIO: ORTHOPAEDICS  Left 1   WASHER SCRW TIMOTHY ASNIS III 6.5X80MM Screw/Plate/Nail/Thuan WASHER SCRW TIMOTHY ASNIS III 6.5X80MM  MAURICIO: ORTHOPAEDICS  Left 1         Drains: * No LDAs found *    Findings: Natalie Zhu MD  Date: 4/16/2019  Time: 9:23 AM

## 2019-04-16 NOTE — PROGRESS NOTES
0827-to pacu from OR. Pt awake but drowsy. States pain 3/10 and tolerable. Dressing to left hip dry and intact. Ice to site. Left pedal pulse palpable. IV infusing. Monitor in sinus rhythm.

## 2019-04-16 NOTE — OP NOTE
[address] [place] [address]                                OPERATIVE REPORT    PATIENT NAME: Freda Mack                     :        1935  MED REC NO:   [id number]                          ROOM:       3128  ACCOUNT NO:   [de-identified]                           ADMIT DATE: 04/15/2019  PROVIDER:     Yosi Morillo MD      DATE OF PROCEDURE:  2019    PRIMARY CARE PHYSICIAN:  Gigi Lorenzana MD    PREOPERATIVE DIAGNOSIS:  Left femoral neck valgus impacted minimally  displaced fracture. POSTOPERATIVE DIAGNOSIS:  Left femoral neck valgus impacted minimally  displaced fracture. OPERATION PERFORMED:  Left hip percutaneous skeletal fixation of femoral  neck fracture with cannulated screws. SURGEON:  Yosi Morillo MD    ASSISTANT:  Carla Kerr Surgical Assistant. ANESTHESIA:  General anesthesia. ESTIMATED BLOOD LOSS:  Minimal.    COMPLICATIONS:  None. IMPLANTS USED:  Jasper titanium 6.5 Asnis screws x3. INDICATIONS:  This is an 79-year-old white female who is still fairly  active, sustained a fall with a left hip pain. The patient was found to  have a valgus impacted femoral neck fracture. All risks, benefits, and  alternatives were discussed with the patient, and she agreed to proceed  with surgical treatment. OPERATIVE PROCEDURE:  The patient's left hip was marked. She received  900 mg of clindamycin IV preoperatively. The patient was then brought  to the operating room and underwent general anesthesia. The patient was  then transferred to the fracture table. The fracture was found to be  still valgus impacted minimally displaced fracture. We fine-tuned the  position of the fracture. At this point, we had the left hip prepped  and draped in regular sterile routine fashion. A time-out was called,  confirming the patient's name, site, and procedure.     A small incision was made over the

## 2019-04-16 NOTE — PROGRESS NOTES
required mod Ax2 for sit <> stand transfers and assist to maintain 25% WBing on LLE. Anticipate pt to require mod/max A for ADL needs at this time. Pt is unsafe to return home and would benefit from continued therapy to increase mobility and independence with self-care. Treatment Diagnosis: impaired func mob, transfers, and ADL status   Prognosis: Good;Fair  Decision Making: Medium Complexity  History: PMH: Glaucoma, hyperlipidemia, IBS, hypothyroidism and recent weight loss  Exam: ADLs, transfers, bed mob  Assistance / Modification: mod Ax2 for mobility, mod/max A for ADLs  Patient Education: Role of OT, POC, safety   REQUIRES OT FOLLOW UP: Yes  Activity Tolerance  Activity Tolerance: Patient limited by pain  Safety Devices  Safety Devices in place: Yes(RN Shawanda Blanton) notified)  Type of devices: Call light within reach; Chair alarm in place;Gait belt;Left in chair;Nurse notified           Patient Diagnosis(es): The encounter diagnosis was Closed fracture of left hip, initial encounter ([facility] has a past medical history of Cancer ([facility] Glaucoma, Hyperlipidemia, IBS (irritable bowel syndrome), Seasonal allergies, Thyroid disease, and Weight loss. has a past surgical history that includes eye surgery; cyst removal; Upper gastrointestinal endoscopy (10/18/2016); Colonoscopy (12/04/2018); and pr colonoscopy flx dx w/collj spec when pfrmd (N/A, 12/4/2018).     Treatment Diagnosis: impaired func mob, transfers, and ADL status       Restrictions  Restrictions/Precautions  Restrictions/Precautions: Weight Bearing, Fall Risk  Lower Extremity Weight Bearing Restrictions  Left Lower Extremity Weight Bearing: Partial Weight Bearing  Partial Weight Bearing Percentage Or Pounds: 25%    Subjective   General  Chart Reviewed: Yes  Patient assessed for rehabilitation services?: Yes  Additional Pertinent Hx: Per Tiara Isaacs APRN-CNP 4/15/19: Pt is \"80 y.o. female with PMHx of Glaucoma, hyperlipidemia, IBS, hypothyroidism and recent weight loss  presented to [facility] with Mechanical fall. Patient reports that she was walking into her garage and there is uneven concrete. She tripped over that landing on her left hip. She denies hitting her head and no other injuries. X-rays confirmed left hip fracture. \"  Family / Caregiver Present: Yes( and daughter)  Referring Practitioner: April Fisher MD   Diagnosis: Closed L hip fracture   Subjective  Subjective: Pt met bedside, agreeable for therapy evaluation and OOB activity. Social/Functional History  Social/Functional History  Lives With: Spouse  Type of Home: House  Home Layout: Laundry in basement, Two level(Stairlift to basement and upper level )  Home Access: Stairs to enter without rails  Entrance Stairs - Number of Steps: 1 + 1   Bathroom Shower/Tub: Walk-in shower, Shower chair with back  [employer]: Handicap height  Bathroom Equipment: Grab bars in shower, Hand-held shower  Home Equipment: Rolling walker, Cane  Receives Help From: Family  ADL Assistance: Independent  Homemaking Assistance: Independent(Shares with  )  Ambulation Assistance: Independent  Transfer Assistance: Independent       Objective   Vision: Impaired  Vision Exceptions: Wears glasses for reading(and while driving )  H[facility] [employer] [facility]  Hearing Exceptions: Hard of hearing/hearing concerns    Orientation  Overall Orientation Status: Within Functional Limits     Balance  Sitting Balance: Contact guard assistance  Standing Balance: Minimal assistance  Standing Balance  Time: ~30 seconds  Activity: transfers  Sit to stand: 2 Person assistance  Stand to sit: 2 Person assistance    Functional Mobility  Functional Mobility Comments: Pt unsafe to attempt functional mobility at this time d/t 25% WBing precautions. Use of jeanna stedy     ADL  Toileting: (Catheter)  Additional Comments: PTA pt reports independence in self-care tasks.  Anticipate pt to require up to mod/max A for bathing/dressing/toileting. Tone RUE  RUE Tone: Normotonic  Tone LUE  LUE Tone: Normotonic  Coordination  Movements Are Fluid And Coordinated: Yes     Bed mobility  Supine to Sit: Moderate assistance;2 Person assistance  Sit to Supine: Unable to assess(Up in chair at end of session)     Transfers  Sit to stand: 2 Person assistance  Stand to sit: 2 Person assistance  Transfer Comments: Mod Ax2 for sit <>  jeanna no while assisting pt to maintain WBing precautions. Cognition  Overall Cognitive Status: Exceptions  Arousal/Alertness: Delayed responses to stimuli  Safety Judgement: Decreased awareness of need for assistance;Decreased awareness of need for safety  Problem Solving: Assistance required to generate solutions;Assistance required to implement solutions        Sensation  Overall Sensation Status: WFL        LUE AROM (degrees)  LUE General AROM: Limited ROM in shoulder flexion   Left Hand AROM (degrees)  Left Hand AROM: WFL  RUE AROM (degrees)  RUE General AROM: Limited ROM in shoulder flexion   Right Hand AROM (degrees)  Right Hand AROM: WFL     LUE Strength  LUE Strength Comment: General weakness - did not formally assess  RUE Strength  RUE Strength Comment: General weakness - did not formally assess          Plan   Plan  Times per week: 3-5  Times per day: Daily  Current Treatment Recommendations: Strengthening, Functional Mobility Training, Endurance Training, Balance Training, Safety Education & Training, Equipment Evaluation, Education, & procurement, Patient/Caregiver Education & Training, Self-Care / ADL    AM-PAC Score    Jigar Rebolledo scored a 16/24 on the AM-PAC ADL Inpatient form. Current research shows that an AM-PAC score of 17 or less is typically not associated with a discharge to the patient's home setting.  Based on the patients AM-PAC score and their current ADL deficits, it is recommended that the patient have 3-5 sessions per week of Occupational Therapy at d/c to increase the patients independence. AM-PAC Inpatient Daily Activity Raw Score: 16  AM-PAC Inpatient ADL T-Scale Score : 35.96  ADL Inpatient CMS 0-100% Score: 53.32  ADL Inpatient CMS G-Code Modifier : CK    Goals  Short term goals  Time Frame for Short term goals: prior to d/c  Short term goal 1: Pt will complete sit <> stand transfers with min A  Short term goal 2: Pt will complete bathing with min A  Short term goal 3: Pt will complete dressing with min A  Short term goal 4: Pt will complete toileting with min A  Short term goal 5: Pt will complete grooming with setup  Patient Goals   Patient goals : \"to go home\"       Therapy Time   Individual Concurrent Group Co-treatment   Time In       1440   Time Out       1520   Minutes       40   Timed Code Treatment Minutes: 25 Minutes(15 min eval)     If pt is discharged prior to next OT session, this note will serve as the discharge summary.     Avis Almanza

## 2019-04-16 NOTE — CONSULTS
[facility] Orthopedic Surgery  Consult Note        This patient is seen in consultation at the request of Julissa Lee    Reason for Consult:  Left hip femoral neck fracture    CHIEF COMPLAINT:  Left hip pain/fall. History Obtained From:  patient, electronic medical record    HISTORY OF PRESENT ILLNESS:    Roberto Mejia 80 y.o. female who presented last night to [facility] emergency department with complaint of left hip pain following a fall, when she stumbled in her garage at home, missing a step, and fell directly onto her left hip. She denies injuries to any other parts of the body, denying any head trauma or loss of consciousness. She was unable to stand back up. She is complaining of left hip pain. This is better with rest and worse with bearing any wt. The pain is sharp and not radiating. No numbness or tingling sensation. No other complaint. Past Medical History:        Diagnosis Date    Cancer [facility])     SKIN    Glaucoma     Hyperlipidemia     IBS (irritable bowel syndrome)     Seasonal allergies     Thyroid disease     Weight loss        Past Surgical History:        Procedure Laterality Date    COLONOSCOPY  12/04/2018    Silver-normal    CYST REMOVAL      EYE SURGERY      CATARACT    UT COLONOSCOPY FLX DX W/COLLJ SPEC WHEN PFRMD N/A 12/4/2018    COLONOSCOPY DIAGNOSTIC OR SCREENING performed by Mahamed Gutierrez MD at [address] [place] [address]  10/18/2016    Dr Corey Hwang       Medications prior to admission:   Prior to Admission medications    Medication Sig Start Date End Date Taking?  Authorizing Provider   CRANBERRY PO Take by mouth daily   Yes Historical Provider, MD   oxybutynin (DITROPAN-XL) 10 MG extended release tablet Take 10 mg by mouth daily   Yes Historical Provider, MD   vitamin D (CHOLECALCIFEROL) 1000 UNIT TABS tablet Take 1,000 Units by mouth daily   Yes Historical Provider, MD   trimethoprim (TRIMPEX) 100 MG tablet Take 100 mg by mouth daily    Yes Historical Provider, MD   conjugated estrogens (PREMARIN) 0.625 MG/GM vaginal cream Place 0.5 g vaginally Twice a Week    Yes Historical Provider, MD   triamcinolone (KENALOG) 0.1 % ointment Apply topically 2 times daily as needed    Yes Historical Provider, MD   atorvastatin (LIPITOR) 40 MG tablet Take 40 mg by mouth daily  10/12/16  Yes Historical Provider, MD   levothyroxine (SYNTHROID) 75 MCG tablet Take 75 mcg by mouth Daily  10/12/16  Yes Historical Provider, MD   bimatoprost (LUMIGAN) 0.01 % SOLN ophthalmic drops Place 1 drop into both eyes nightly    Yes Historical Provider, MD   brinzolamide-brimonidine 1-0.2 % SUSP Place 1 drop into both eyes 2 times daily    Yes Historical Provider, MD   nystatin 853917 UNIT/GM POWD Apply topically 2 times daily as needed    Yes Historical Provider, MD   omeprazole (PRILOSEC) 20 MG delayed release capsule Take 20 mg by mouth daily    Yes Historical Provider, MD       Current Medications:   Current Facility-Administered Medications: morphine (PF) injection 4 mg, 4 mg, Intravenous, Once  clindamycin (CLEOCIN) 900 mg in dextrose 5 % 50 mL IVPB, 900 mg, Intravenous, On Call to OR  sodium chloride flush 0.9 % injection 10 mL, 10 mL, Intravenous, 2 times per day  sodium chloride flush 0.9 % injection 10 mL, 10 mL, Intravenous, PRN  acetaminophen (TYLENOL) tablet 650 mg, 650 mg, Oral, Q4H PRN  ondansetron (ZOFRAN) injection 4 mg, 4 mg, Intravenous, Q6H PRN  enoxaparin (LOVENOX) injection 40 mg, 40 mg, Subcutaneous, Daily  0.9 % sodium chloride infusion, , Intravenous, Continuous  morphine (PF) injection 2 mg, 2 mg, Intravenous, Q4H PRN    Allergies:  Amoxicillin and Penicillins    Social History     Socioeconomic History    Marital status:      Spouse name: Not on file    Number of children: Not on file    Years of education: Not on file    Highest education level: Not on file   Occupational History    Occupation: retired   Social Needs    Financial resource strain: Not on file    Food insecurity:     Worry: Not on file     Inability: Not on file    Transportation needs:     Medical: Not on file     Non-medical: Not on file   Tobacco Use    Smoking status: Never Smoker    Smokeless tobacco: Never Used   Substance and Sexual Activity    Alcohol use: No    Drug use: No    Sexual activity: Not on file   Lifestyle    Physical activity:     Days per week: Not on file     Minutes per session: Not on file    Stress: Not on file   Relationships    Social connections:     Talks on phone: Not on file     Gets together: Not on file     Attends Cheondoism service: Not on file     Active member of club or organization: Not on file     Attends meetings of clubs or organizations: Not on file     Relationship status: Not on file    Intimate partner violence:     Fear of current or ex partner: Not on file     Emotionally abused: Not on file     Physically abused: Not on file     Forced sexual activity: Not on file   Other Topics Concern    Not on file   Social History Narrative    Not on file       Family History:  Family History   Problem Relation Age of Onset    Diabetes Mother     Stroke Mother     Heart Disease Mother     COPD Brother          REVIEW OF SYSTEMS:   CONSTITUTIONAL: Denies unexplained weight loss, fevers, chills or fatigue  NEUROLOGICAL: Denies unsteady gait or progressive weakness    PSYCHOLOGICAL: Denies anxiety, depression   SKIN: Denies skin changes, delayed healing, rash, itching   HEMATOLOGIC: Denies easy bleeding or bruising  ENDOCRINE: Denies excessive thirst, urination, heat/cold  RESPIRATORY: Denies current dyspnea, cough  CARDIOVASCULAR: Negative for chest pain at this time. EYES: Negative for photophobia and visual disturbance. ENT:  Negative for rhinorrhea, epistaxis, sore throat, or hearing loss. GI: Denies nausea, vomiting, diarrhea   : Denies bowel or bladder issues   MUSCULOSKELETAL: Left hip pain.   All other ROS reviewed in chart or with patient or family and are grossly negative. PHYSICAL EXAMINATION:  Ms. Lee Ann Maldonado is a very pleasant 80 y.o. female who seen today in no acute distress, awake, alert, and oriented. She is well nourished and groomed. Patient with normal affect. Body mass index is 29.14 kg/m². . Skin warm and dry. Resting respiratory rate is 16. Resp deep and easy. Pulse is with regular rate and rhythm    BP (!) 178/162   Pulse 70   Temp 98.3 °F (36.8 °C) (Oral)   Resp 18   Ht 5' 2.99\" (1.6 m)   Wt 164 lb 7.4 oz (74.6 kg)   SpO2 96%   BMI 29.14 kg/m²        Airway is intact  Chest: chest clear, no wheezing, rales, normal symmetric air entry, no tachypnea, retractions or cyanosis  Heart: regular rate and rhythm ; heart sounds normal   Hearing intact, pupil equal and reactive bilateral  Lymphatics; No groin or axillary enlarged lymph nodes. Neck; No swelling  Abdomen; soft, non distended. MUSCULOSKELETAL:   Left leg in external rotation with pain with ROM, Examination of both lower extrimiteis showing a decreased range of motion and muscle power of the left hip compare to the other side because of pain. There is mild swelling that can be seen, and ecchymosis over the left hip, but no wound. She has intact sensation and good pedal pulses bilateral.  She has significant tenderness on deep palpation over the left hip. Good strength, and no instability both upper and the other lower extremities. NVI bilateral lower and upper extermities.      NEUROLOGIC:   Sensory:    Touch:                     Right Upper Extremity:  normal                   Left Upper Extremity:  normal                  Right Lower Extremity:  normal                  Left Lower Extremity:  normal        DATA:    CBC:   Lab Results   Component Value Date    WBC 10.3 04/16/2019    RBC 4.43 04/16/2019    HGB 12.5 04/16/2019    HCT 37.7 04/16/2019    MCV 85.1 04/16/2019    MCH 28.3 04/16/2019    MCHC 33.3 04/16/2019    RDW 13.7 04/16/2019     04/16/2019    MPV 7.6 04/16/2019     WBC:    Lab Results   Component Value Date    WBC 10.3 04/16/2019     PT/INR:    Lab Results   Component Value Date    PROTIME 12.8 04/16/2019    INR 1.12 04/16/2019     PTT:    Lab Results   Component Value Date    APTT 32.5 04/15/2019   [APTT    IMAGING: X-rays were taken 4/15/2019, 3 views of the left hip and AP pelvis, was reviewed and showed impacted minimally displaced left femoral neck fracture. IMPRESSION: Left hip impacted minimally displaced femoral neck fracture. PLAN:   I discussed the overall alignment of the fracture with the patient, and treatment options including both surgical and non-surgical treatment, and that my recommendation would be percutaneous hip pinning given the impacted minimal displacement of the fracture. I discussed the risks and benefits of surgery with the patient, including but not limited to infection, bleeding, pain, injury to nerves or blood vessels failure of the surgery and need for additional surgery. All the patient's questions were answered. We discussed an expected post-operative course. She is understanding of this and wishes to proceed. Surgery today if medically stable    Thank you very much for the kind consultation and allowing me to participate in this patient's care. I will continue to keep you apprised of her progress.         Gabriel Robertson MD   4/16/2019  6:53 AM

## 2019-04-16 NOTE — ANESTHESIA POSTPROCEDURE EVALUATION
Department of Anesthesiology  Postprocedure Note    Patient: Bridget Saldana  MRN: [id number]  YOB: 1935  Date of evaluation: 4/16/2019  Time:  9:55 AM     Procedure Summary     Date:  04/16/19 Room / Location:  [facility] OR  / [facility] OR    Anesthesia Start:  0726 Anesthesia Stop:  0825    Procedure:  LEFT HIP PINNING (Left Hip) Diagnosis:  (LEFT HIP FX)    Surgeon:  Brianna Garner MD Responsible Provider:  Abilio Blount MD    Anesthesia Type:  general ASA Status:  3          Anesthesia Type: general    Sky Phase I: Sky Score: 8    Sky Phase II:      Last vitals: Reviewed and per EMR flowsheets.        Anesthesia Post Evaluation    Patient location during evaluation: PACU  Patient participation: complete - patient participated  Level of consciousness: awake and alert  Pain score: 0  Airway patency: patent  Nausea & Vomiting: no nausea and no vomiting  Complications: no  Cardiovascular status: blood pressure returned to baseline  Respiratory status: acceptable  Hydration status: euvolemic

## 2019-04-16 NOTE — PROGRESS NOTES
Hospitalist Progress Note      PCP: Michael Angel    Date of Admission: 4/15/2019    Chief Complaint: Left hip fracture, Fall    Subjective:   Seen postop with  present in room  Denies any shortness breath or chest pain  No nausea vomiting and wanting to eat  Pain well-controlled with current pain regimen      Medications:  Reviewed    Infusion Medications    sodium chloride 75 mL/hr at 04/16/19 1049     Scheduled Medications    sodium chloride flush  10 mL Intravenous 2 times per day    docusate sodium  100 mg Oral BID    enoxaparin  40 mg Subcutaneous Nightly    clindamycin (CLEOCIN) IV  900 mg Intravenous Q8H    morphine  4 mg Intravenous Once     PRN Meds: sodium chloride flush, ondansetron, HYDROcodone 5 mg - acetaminophen, acetaminophen, morphine      Intake/Output Summary (Last 24 hours) at 4/16/2019 1210  Last data filed at 4/16/2019 0912  Gross per 24 hour   Intake 600 ml   Output 1350 ml   Net -750 ml       Physical Exam Performed:    BP (!) 153/74   Pulse 75   Temp 98.3 °F (36.8 °C) (Oral)   Resp 18   Ht 5' 2.99\" (1.6 m)   Wt 164 lb 7.4 oz (74.6 kg)   SpO2 95%   BMI 29.14 kg/m²     General appearance: No apparent distress, appears stated age and cooperative. HEENT: Pupils equal, round, and reactive to light. Conjunctivae/corneas clear. Neck: Supple, with full range of motion. No jugular venous distention. Trachea midline. Respiratory:  Normal respiratory effort. Clear to auscultation, bilaterally without Rales/Wheezes/Rhonchi. Cardiovascular: Regular rate and rhythm with normal S1/S2 without murmurs, rubs or gallops. Abdomen: Soft, non-tender, non-distended with normal bowel sounds. Musculoskeletal: No clubbing, cyanosis or edema bilaterally. Full range of motion without deformity. Skin: Skin color, texture, turgor normal.  No rashes or lesions. Neurologic:  Neurovascularly intact without any focal sensory/motor deficits.  Cranial nerves: II-XII intact, grossly non-focal.  Psychiatric: Alert and oriented, thought content appropriate, normal insight  Capillary Refill: Brisk,< 3 seconds   Peripheral Pulses: +2 palpable, equal bilaterally       Labs:   Recent Labs     04/15/19  1742 04/16/19  0430   WBC 7.6 10.3   HGB 12.2 12.5   HCT 36.9 37.7    166     Recent Labs     04/15/19  1742 04/16/19  0430    138   K 4.2 4.2    102   CO2 29 24   BUN 11 8   CREATININE 0.7 0.6   CALCIUM 9.1 8.6     No results for input(s): AST, ALT, BILIDIR, BILITOT, ALKPHOS in the last 72 hours. Recent Labs     04/15/19  1742 04/16/19  0430   INR 1.11 1.12     No results for input(s): Aladdin Specking in the last 72 hours. Urinalysis:      Lab Results   Component Value Date    NITRU POSITIVE 09/30/2018    WBCUA 15 09/30/2018    BACTERIA 4+ 09/30/2018    RBCUA 1 09/30/2018    BLOODU Negative 09/30/2018    SPECGRAV 1.006 09/30/2018    GLUCOSEU Negative 09/30/2018       Radiology:  XR HIP LEFT (2-3 VIEWS)   Preliminary Result   Intraprocedural fluoroscopic spot images as above. See separate procedure   report for more information. FLUORO FOR SURGICAL PROCEDURES   Final Result      XR HIP LEFT (2-3 VIEWS)   Final Result   Mildly displaced fracture through the junction of the left femoral head and   neck.              Assessment/Plan:    Active Hospital Problems    Diagnosis Date Noted    Closed fracture of neck of left femur ([facility] [S72.002A]     Closed left hip fracture, initial encounter ([facility] [S72.002A] 04/15/2019    Mixed hyperlipidemia [E78.2] 04/15/2019    Acquired hypothyroidism [E03.9] 04/15/2019    Other specified glaucoma [H40.89] 04/15/2019    Irritable bowel syndrome with diarrhea [K58.0] 04/15/2019     D/c kim catheter in am  Wean off oxygen for SpO2 > 92%  Pain control with percocet for now  Lovenox for DVT Px    DVT Prophylaxis: Lovenox  Diet: DIET GENERAL;  Dietary Nutrition Supplements: Standard High Calorie Oral Supplement  Code Status: Full Code    PT/OT Eval Status: Ordered    Dispo - continue inpatient until symptoms improve, likely discharged to skilled nurse facility tomorrow if able to wean off oxygen, PT OT evaluation and passed voiding trial    Peg MD Karol  Hospitalist

## 2019-04-16 NOTE — DISCHARGE INSTR - COC
[facility] ([facility]) Continuity of Care Form    Patient Name:  Barbra Harrison  : 1935    MRN:  [id number]    Admit date:  4/15/2019  Discharge date:  19     Code Status Order: Full Code  Advance Directives: No    Admitting Physician: Rad Davalos MD  PCP: Brandon Cantu    [facility] S5C-1091/3128-01  Discharging Unit Phone Number: [phone]    Emergency Contact:        Past Surgical History:  Past Surgical History:   Procedure Laterality Date    COLONOSCOPY  2018    Silver-normal    CYST REMOVAL      EYE SURGERY      CATARACT    NE COLONOSCOPY FLX DX W/COLLJ SPEC WHEN PFRMD N/A 2018    COLONOSCOPY DIAGNOSTIC OR SCREENING performed by Reyes Bond, MD at [address] [place] [address]  10/18/2016    Dr Stan Hinojosa       Immunization History: There is no immunization history on file for this patient. Active Problems:  Active Problems:    Closed left [facility] fracture, [facility]    Mixed hyperlipidemia    Acquired hypothyroidism    Other specified glaucoma    Irritable bowel syndrome with diarrhea    Closed fracture of neck of left femur (HCC)  Resolved Problems:    * No resolved hospital problems.  *      Isolation/Infection:       Nurse Assessment:  Last Vital Signs:BP (!) 153/74   Pulse 75   Temp 98.3 °F (36.8 °C) (Oral)   Resp 18   Ht 5' 2.99\" (1.6 m)   Wt 164 lb 7.4 oz (74.6 kg)   SpO2 95%   BMI 29.14 kg/m²   Last documented pain score (0-10 scale): Pain Level: 8  Last Weight:   Wt Readings from Last 1 Encounters:   19 164 lb 7.4 oz (74.6 kg)     Mental Status:  oriented     IV Access:  - None    Nursing Mobility/ADLs:  Walking   Assisted  Transfer  Assisted  Bathing  Assisted  Dressing  Assisted  Toileting  Assisted  Feeding  Independent  Med Admin  Independent  Med Delivery   whole    Wound Care Documentation and Therapy:  Keep tan Mepilex dressing clean and intact for 7 days after surgery then remove and leave wound to air. Mepilex is waterproof for showering. Elimination:  Urinary Catheter: None   Colostomy/Ileostomy: No  Continence:   · Bowel: yes  · Bladder: Yes  Date of Last BM:     Intake/Output Summary (Last 24 hours)     Intake/Output Summary (Last 24 hours) at 4/16/2019 1100  Last data filed at 4/16/2019 0912  Gross per 24 hour   Intake 600 ml   Output 1350 ml   Net -750 ml     Safety Concerns:     None    Impairments/Disabilities:      None    Nutrition Therapy:  Current Nutrition Therapy: DIET GENERAL;  Dietary Nutrition Supplements: Standard High Calorie Oral Supplement  Routes of Feeding: Oral  Liquids: Thin Liquids  Daily Fluid Restriction: no  Last Modified Barium Swallow with Video (Video Swallowing Test): not done    Treatments at the Time of Hospital Discharge:   Respiratory Treatments:   Oxygen Therapy:  is not on home oxygen therapy. Ventilator:    - No ventilator support    Lab orders for discharge:        Rehab Therapies: Physical Therapy, Occupational Therapy and nursing care  Weight Bearing Status/Restrictions: 25% WB on left leg  Other Medical Equipment (for information only, NOT a DME order): rolling walker   Other Treatments:     Patient's personal belongings (please select all that are sent with patient):  None    RN SIGNATURE:  Electronically signed by Rojelio Tran RN on 4/18/19 at 10:35 AM    PHYSICIAN SECTION    Prognosis: Good    Condition at Discharge: Stable    Rehab Potential (if transferring to Rehab): Good  1. DVT prophylaxis ordered, Lovenox as inpt then ASA daily for 30 days as ordered. 2. PT OT for ADL's and ambulation as tolerated, 25% WB left leg  3. Aspirin 325mg daily for 30 days after any fracture repair surgery.    4. Followup Dr Kandi Alcantar in 2 weeks   [facility] and [facility], [address] [place] [address], [address] [place] [address],   [phone]    Physician Certification: I certify the above orders, information, and transfer of Cristine Alba is necessary for the continuing treatment of the diagnosis listed and that he requires [place] for less 30 days.      Update Admission H&P: No change in H&P    PHYSICIAN SIGNATURE:  Electronically signed by Carolynn Spence MD, 11:49 AM 04/16/19    CASE MANAGEMENT/SOCIAL WORK SECTION    Inpatient Status Date:4/15/19    [facility]    Discharging to Facility/ Agency   · Name: [facility]  · Address: [place] [id number]  · Phone: [phone]  · Fax: [phone]      / signature: Electronically signed by Erin Irene on 4/18/19 at 10:18 AM

## 2019-04-16 NOTE — PLAN OF CARE
Patient assess for pain, receiving prn pain medication as ordered. Respirations above ten. Patient educated on strict bed rest. NPO after midnight.

## 2019-04-17 LAB
HCT VFR BLD CALC: 34.9 % (ref 36–48)
HEMOGLOBIN: 11.6 G/DL (ref 12–16)

## 2019-04-17 PROCEDURE — 94760 N-INVAS EAR/PLS OXIMETRY 1: CPT

## 2019-04-17 PROCEDURE — 93005 ELECTROCARDIOGRAM TRACING: CPT | Performed by: ORTHOPAEDIC SURGERY

## 2019-04-17 PROCEDURE — 36415 COLL VENOUS BLD VENIPUNCTURE: CPT

## 2019-04-17 PROCEDURE — 6360000002 HC RX W HCPCS: Performed by: ORTHOPAEDIC SURGERY

## 2019-04-17 PROCEDURE — 2500000003 HC RX 250 WO HCPCS: Performed by: ORTHOPAEDIC SURGERY

## 2019-04-17 PROCEDURE — 97535 SELF CARE MNGMENT TRAINING: CPT

## 2019-04-17 PROCEDURE — 85018 HEMOGLOBIN: CPT

## 2019-04-17 PROCEDURE — 2060000000 HC ICU INTERMEDIATE R&B

## 2019-04-17 PROCEDURE — 97530 THERAPEUTIC ACTIVITIES: CPT

## 2019-04-17 PROCEDURE — 2580000003 HC RX 258: Performed by: ORTHOPAEDIC SURGERY

## 2019-04-17 PROCEDURE — 6370000000 HC RX 637 (ALT 250 FOR IP): Performed by: ORTHOPAEDIC SURGERY

## 2019-04-17 PROCEDURE — 85014 HEMATOCRIT: CPT

## 2019-04-17 PROCEDURE — 6370000000 HC RX 637 (ALT 250 FOR IP): Performed by: NURSE PRACTITIONER

## 2019-04-17 RX ADMIN — ENOXAPARIN SODIUM 40 MG: 40 INJECTION SUBCUTANEOUS at 21:07

## 2019-04-17 RX ADMIN — DOCUSATE SODIUM 100 MG: 100 CAPSULE, LIQUID FILLED ORAL at 21:07

## 2019-04-17 RX ADMIN — CLINDAMYCIN PHOSPHATE 900 MG: 900 INJECTION, SOLUTION INTRAVENOUS at 00:00

## 2019-04-17 RX ADMIN — Medication 10 ML: at 21:07

## 2019-04-17 RX ADMIN — DOCUSATE SODIUM 100 MG: 100 CAPSULE, LIQUID FILLED ORAL at 09:18

## 2019-04-17 RX ADMIN — HYDROCODONE BITARTRATE AND ACETAMINOPHEN 1 TABLET: 5; 325 TABLET ORAL at 08:27

## 2019-04-17 RX ADMIN — Medication 10 ML: at 09:19

## 2019-04-17 RX ADMIN — HYDROCODONE BITARTRATE AND ACETAMINOPHEN 1 TABLET: 5; 325 TABLET ORAL at 20:00

## 2019-04-17 ASSESSMENT — PAIN SCALES - GENERAL
PAINLEVEL_OUTOF10: 0
PAINLEVEL_OUTOF10: 0
PAINLEVEL_OUTOF10: 8
PAINLEVEL_OUTOF10: 0
PAINLEVEL_OUTOF10: 10
PAINLEVEL_OUTOF10: 2
PAINLEVEL_OUTOF10: 0
PAINLEVEL_OUTOF10: 8

## 2019-04-17 ASSESSMENT — PAIN DESCRIPTION - PAIN TYPE
TYPE: SURGICAL PAIN

## 2019-04-17 ASSESSMENT — PAIN DESCRIPTION - PROGRESSION
CLINICAL_PROGRESSION: NOT CHANGED

## 2019-04-17 ASSESSMENT — PAIN DESCRIPTION - FREQUENCY
FREQUENCY: CONTINUOUS
FREQUENCY: CONTINUOUS
FREQUENCY: INTERMITTENT

## 2019-04-17 ASSESSMENT — PAIN - FUNCTIONAL ASSESSMENT
PAIN_FUNCTIONAL_ASSESSMENT: PREVENTS OR INTERFERES SOME ACTIVE ACTIVITIES AND ADLS

## 2019-04-17 ASSESSMENT — PAIN DESCRIPTION - ORIENTATION
ORIENTATION: LEFT

## 2019-04-17 ASSESSMENT — PAIN DESCRIPTION - DESCRIPTORS
DESCRIPTORS: ACHING

## 2019-04-17 ASSESSMENT — PAIN DESCRIPTION - ONSET
ONSET: ON-GOING

## 2019-04-17 ASSESSMENT — PAIN DESCRIPTION - LOCATION
LOCATION: LEG

## 2019-04-17 NOTE — PROGRESS NOTES
Patient resting in bed. Bed alarm on. Patient educated to use the call light prior to getting up. Patient has 0.45 percent sodium chloride infusing at 75 ml/hr as ordered. Fall risk assessment completed. Fall precautions in place. Will continue to monitor.

## 2019-04-17 NOTE — PROGRESS NOTES
Occupational Therapy  Facility/Department: [address] ORTHOPEDICS  Daily Treatment Note  NAME: James Liu  : 1935  MRN: [id number]    Date of Service: 2019    Assessment: Pt tolerated session fair this date with reports of pain with movement but able to tolerate therapy session. Pt completed sit <> stand transfers several times to RW with mod Ax2 and monitoring to maintain 25% WBing. Pt tolerated standing for ~45 seconds with CGA/min A for standing balance. Pt completed grooming tasks with setup in chair. Pt remains unsafe to return home and would benefit from continued therapy to increase mobility and self-care prior to return home. Discharge Recommendations:  3-5 sessions per week     James Liu scored a 16/24 on the AM-PAC ADL Inpatient form. Current research shows that an AM-PAC score of 17 or less is typically not associated with a discharge to the patient's home setting. Based on the patients AM-PAC score and their current ADL deficits, it is recommended that the patient have 3-5 sessions per week of Occupational Therapy at d/c to increase the patients independence. Assessment   Performance deficits / Impairments: Decreased functional mobility ; Decreased strength;Decreased endurance;Decreased ADL status; Decreased balance  Assessment: Pt tolerated session fair this date with reports of pain with movement but able to tolerate therapy session. Pt completed sit <> stand transfers several times to RW with mod Ax2 and monitoring to maintain 25% WBing. Pt tolerated standing for ~45 seconds with CGA/min A for standing balance. Pt completed grooming tasks with setup in chair. Pt remains unsafe to return home and would benefit from continued therapy to increase mobility and self-care prior to return home.    Treatment Diagnosis: impaired func mob, transfers, and ADL status   Prognosis: Good;Fair  Patient Education: Role of OT, POC, safety   REQUIRES OT FOLLOW UP: Yes  Activity Tolerance  Activity Tolerance: Patient Tolerated treatment well  Safety Devices  Safety Devices in place: Yes  Type of devices: Left in chair;Call light within reach;Gait belt;Nurse notified; Chair alarm in place         Patient Diagnosis(es): The encounter diagnosis was Closed fracture of left hip, initial encounter ([facility] has a past medical history of Cancer ([facility] Glaucoma, Hyperlipidemia, IBS (irritable bowel syndrome), Seasonal allergies, Thyroid disease, and Weight loss. has a past surgical history that includes eye surgery; cyst removal; Upper gastrointestinal endoscopy (10/18/2016); Colonoscopy (12/04/2018); and pr colonoscopy flx dx w/collj spec when pfrmd (N/A, 12/4/2018). Restrictions  Restrictions/Precautions  Restrictions/Precautions: Weight Bearing, Fall Risk  Lower Extremity Weight Bearing Restrictions  Left Lower Extremity Weight Bearing: Partial Weight Bearing  Partial Weight Bearing Percentage Or Pounds: 25%     Subjective   General  Chart Reviewed: Yes  Patient assessed for rehabilitation services?: Yes  Additional Pertinent Hx: Per Wilfredo Shukla APRN-CNP 4/15/19: Pt is \"80 y.o. female with PMHx of Glaucoma, hyperlipidemia, IBS, hypothyroidism and recent weight loss  presented to [facility] with Mechanical fall. Patient reports that she was walking into her garage and there is uneven concrete. She tripped over that landing on her left hip. She denies hitting her head and no other injuries. X-rays confirmed left hip fracture. \"  Family / Caregiver Present: Yes( and daughter)  Referring Practitioner: Norberto Helton MD   Diagnosis: Closed L hip fracture   Subjective  Subjective: Pt met bedside, agreeable for therapy and to attempt stand to RW  Vital Signs  Patient Currently in Pain: (did not rate, reports pain )        Objective    ADL  Grooming: Setup(Pt washed hands/face and completed oral care while seated )  UE Dressing: (Assist to don fresh gown )        Balance  Sitting Balance: Contact guard assistance  Standing Balance: Minimal assistance  Standing Balance  Time: x3 trials ranging from 15 seconds to 45 seconds  Activity: static stand to RW  Sit to stand: 2 Person assistance  Stand to sit: 2 Person assistance    Functional Mobility  Functional Mobility Comments: Pt unsafe to attempt functional mobility at this time d/t 25% WBing precautions. Stood to [employer] several times, able to maintain 25% WBing with therapist foot under her LLE. Transfers  Transfer Comments: Mod Ax2 for sit <> stand from recliner chair, monitoring WBing precautions         Cognition  Overall Cognitive Status: Exceptions  Arousal/Alertness: Delayed responses to stimuli  Safety Judgement: Decreased awareness of need for assistance;Decreased awareness of need for safety  Problem Solving: Assistance required to generate solutions;Assistance required to implement solutions        Plan   Plan  Times per week: 3-5  Times per day: Daily  Current Treatment Recommendations: Strengthening, Functional Mobility Training, Endurance Training, Balance Training, Safety Education & Training, Equipment Evaluation, Education, & procurement, Patient/Caregiver Education & Training, Self-Care / ADL     AM-PAC Score    Ankita Lisa scored a 16/24 on the AM-[facility] ADL Inpatient form. Current research shows that an AM-PAC score of 17 or less is typically not associated with a discharge to the patient's home setting. Based on the patients AM-PAC score and their current ADL deficits, it is recommended that the patient have 3-5 sessions per week of Occupational Therapy at d/c to increase the patients independence.        AM-[facility] Inpatient Daily Activity Raw Score: 16  AM-PAC Inpatient ADL T-Scale Score : 35.96  ADL Inpatient CMS 0-100% Score: 53.32  ADL Inpatient CMS G-Code Modifier : CK    Goals  Short term goals  Time Frame for Short term goals: prior to d/c: status of all goals ongoing   Short term goal 1: Pt will complete sit <> stand transfers with min A  Short term goal 2: Pt will complete bathing with min A  Short term goal 3: Pt will complete dressing with min A  Short term goal 4: Pt will complete toileting with min A  Short term goal 5: Pt will complete grooming with setup  Patient Goals   Patient goals : \"to go home\"       Therapy Time   Individual Concurrent Group Co-treatment   Time In       1350   Time Out       1415   Minutes       25   Timed Code Treatment Minutes: 25 Minutes     If pt is discharged prior to next OT session, this note will serve as the discharge summary.     Avis Valdez

## 2019-04-17 NOTE — PLAN OF CARE
Problem: Pain:  Goal: Pain level will decrease  Description  Pain level will decrease  4/17/2019 1317 by Bruce Mcguire RN  Outcome: Ongoing  Note:   Patient pain controlled with pharmacologic and non pharmacologic interventions at this time. Will continue to monitor. Problem: Pain:  Goal: Control of acute pain  Description  Control of acute pain  4/17/2019 1317 by Bruce Mcguire RN  Outcome: Ongoing  Note:   Patient pain controlled with pharmacologic and non pharmacologic interventions at this time. Will continue to monitor. Problem: Pain:  Goal: Control of chronic pain  Description  Control of chronic pain  4/17/2019 1317 by Bruce Mcguire RN  Outcome: Ongoing  Note:   Patient pain controlled with pharmacologic and non pharmacologic interventions at this time. Will continue to monitor. Problem: Risk for Impaired Skin Integrity  Goal: Tissue integrity - skin and mucous membranes  Description  Structural intactness and normal physiological function of skin and  mucous membranes. 4/17/2019 1317 by Bruce Mcguire RN  Outcome: Ongoing  Note:   Patient skin and mucus membrane integrity remain unchanged. Will continue to monitor. Problem: Falls - Risk of:  Goal: Will remain free from falls  Description  Will remain free from falls  4/17/2019 1317 by Bruce Mcguire RN  Outcome: Ongoing  Note:   Patient remains free from falls during this shift. Will continue to implement fall precautions. Problem: Falls - Risk of:  Goal: Absence of physical injury  Description  Absence of physical injury  4/17/2019 1317 by Bruce Mcguire RN  Outcome: Ongoing  Note:   Patient remains free from physical injury during this shift. Will continue to assess.

## 2019-04-17 NOTE — PROGRESS NOTES
[facility] Orthopedic Surgery   Progress Note      S/P :  SUBJECTIVE  IN bed. Alert and oriented. Pain is   described in left hip and with the intensity of mild. Pain is described as aching. OBJECTIVE              Physical                      VITALS:  BP (!) 148/63   Pulse 72   Temp 98.6 °F (37 °C) (Oral)   Resp 18   Ht 5' 2.99\" (1.6 m)   Wt 164 lb 7.4 oz (74.6 kg)   SpO2 93%   BMI 29.14 kg/m²                     MUSCULOSKELETAL:  left foot NVI. Wiggles toes to command. Pedal pulses are palpable.                    NEUROLOGIC:                                  Sensory:  Touch:  Left Lower Extremity:  normal                                                 Surgical wound appears clean and dry with Mepilex dressing left hip    Data       CBC:   Lab Results   Component Value Date    WBC 10.3 04/16/2019    RBC 4.43 04/16/2019    HGB 11.6 04/17/2019    HCT 34.9 04/17/2019    MCV 85.1 04/16/2019    MCH 28.3 04/16/2019    MCHC 33.3 04/16/2019    RDW 13.7 04/16/2019     04/16/2019    MPV 7.6 04/16/2019        WBC:    Lab Results   Component Value Date    WBC 10.3 04/16/2019        Hemoglobin/Hematocrit:    Lab Results   Component Value Date    HGB 11.6 04/17/2019    HCT 34.9 04/17/2019        PT/INR:    Lab Results   Component Value Date    PROTIME 12.8 04/16/2019    INR 1.12 04/16/2019              Current Inpatient Medications             Current Facility-Administered Medications: 0.45 % sodium chloride infusion, , Intravenous, Continuous  sodium chloride flush 0.9 % injection 10 mL, 10 mL, Intravenous, 2 times per day  sodium chloride flush 0.9 % injection 10 mL, 10 mL, Intravenous, PRN  docusate sodium (COLACE) capsule 100 mg, 100 mg, Oral, BID  ondansetron (ZOFRAN) injection 4 mg, 4 mg, Intravenous, Q6H PRN  enoxaparin (LOVENOX) injection 40 mg, 40 mg, Subcutaneous, Nightly  HYDROcodone-acetaminophen (NORCO) 5-325 MG per tablet 1 tablet, 1 tablet, Oral, Q6H PRN  morphine (PF) injection 4 mg, 4 mg, Intravenous, Once  acetaminophen (TYLENOL) tablet 650 mg, 650 mg, Oral, Q4H PRN    ASSESSMENT AND PLAN      Post left hip pinning yesterday , Stable exam  DVT prophylaxis ordered, Lovenox as inpt then switch to ASA  PT OT for ADL's and ambulation as tolerated, 25% WB left leg  SS for DC planning, ECF needed  IV or PO pain med as ordered    Whitney Thompson [facility]  4/17/2019  10:19 AM

## 2019-04-17 NOTE — PROGRESS NOTES
Patient had oxygen off when I walked into the room. Patient states that it has been off \"for a while\". Patient oxygen saturation checked, and patient was at 96%. Oxygen therapy d/c. Will continue to monitor patient oxygen saturation on room air.

## 2019-04-17 NOTE — PROGRESS NOTES
Patient's  brought in patient's glaucoma drops from home (L[place] and [place]). Dr. Darby Junior regarding orders for these drops. Drops are currently in the safe in the patient's room. Patient and  were educated on the use of these drops and both verbally agreed to refrain from using them until after an order has been placed for them.

## 2019-04-17 NOTE — PROGRESS NOTES
Hospitalist Progress Note      PCP: Poornima Gonzalez    Date of Admission: 4/15/2019    Chief Complaint: Left hip fracture, Fall    Subjective: Pt is feeling better and has no new complaints. Medications:  Reviewed    Infusion Medications    sodium chloride 75 mL/hr at 04/16/19 1049     Scheduled Medications    sodium chloride flush  10 mL Intravenous 2 times per day    docusate sodium  100 mg Oral BID    enoxaparin  40 mg Subcutaneous Nightly    morphine  4 mg Intravenous Once     PRN Meds: sodium chloride flush, ondansetron, HYDROcodone 5 mg - acetaminophen, acetaminophen      Intake/Output Summary (Last 24 hours) at 4/17/2019 1958  Last data filed at 4/17/2019 1400  Gross per 24 hour   Intake 480 ml   Output 2700 ml   Net -2220 ml       Physical Exam Performed:  Vitals: BP (!) 154/64   Pulse 76   Temp 98.3 °F (36.8 °C) (Oral)   Resp 16   Ht 5' 2.99\" (1.6 m)   Wt 164 lb 7.4 oz (74.6 kg)   SpO2 98%   BMI 29.14 kg/m²   General appearance: WD/WN 80y.o. year-old female who is sitting in a chair alert, appears stated age and is cooperative  HEENT: Head: Normocephalic, no lesions, without obvious abnormality. Eye: Normal external eye, conjunctiva, lids cornea, KEVIN. Ears: Normal TM's bilaterally. Normal auditory canals and external ears. Non-tender. Nose: Normal external nose, mucus membranes and septum. Pharynx: Dental Hygiene adequate. Normal buccal mucosa. Normal pharynx. Neck: no adenopathy, no carotid bruit, no JVD, supple, symmetrical, trachea midline and thyroid not enlarged, symmetric, no tenderness/mass/nodules  Lungs: clear to auscultation bilaterally and no use of accessory muscles.   Heart: regular rate and rhythm, S1, S2 normal, no murmur, click, rub or gallop and normal apical impulse  Abdomen: soft, non-tender; bowel sounds normal; no masses,  no organomegaly  Extremities: extremities normal, atraumatic, no cyanosis or edema and Homans sign is negative, no sign of DVT  Skin: Skin color, texture, turgor normal. No rashes or lesions  Neurologic: Mental status: Alert, oriented, thought content appropriate, Cranial nerves II-XII intact; grossly non focal. Gait was not tested. Labs:   Recent Labs     04/15/19  1742 04/16/19  0430 04/17/19  0656   WBC 7.6 10.3  --    HGB 12.2 12.5 11.6*   HCT 36.9 37.7 34.9*    166  --      Recent Labs     04/15/19  1742 04/16/19  0430    138   K 4.2 4.2    102   CO2 29 24   BUN 11 8   CREATININE 0.7 0.6   CALCIUM 9.1 8.6     No results for input(s): AST, ALT, BILIDIR, BILITOT, ALKPHOS in the last 72 hours. Recent Labs     04/15/19  1742 04/16/19  0430   INR 1.11 1.12     No results for input(s): Evern Crane in the last 72 hours. Urinalysis:      Lab Results   Component Value Date    NITRU POSITIVE 09/30/2018    WBCUA 15 09/30/2018    BACTERIA 4+ 09/30/2018    RBCUA 1 09/30/2018    BLOODU Negative 09/30/2018    SPECGRAV 1.006 09/30/2018    GLUCOSEU Negative 09/30/2018       Radiology:  XR HIP LEFT (2-3 VIEWS)   Final Result   Intraprocedural fluoroscopic spot images as above. See separate procedure   report for more information. FLUORO FOR SURGICAL PROCEDURES   Final Result      XR HIP LEFT (2-3 VIEWS)   Final Result   Mildly displaced fracture through the junction of the left femoral head and   neck. Assessment/Plan:    Active Hospital Problems    Diagnosis Date Noted    Closed fracture of neck of left femur ([facility] [S72.002A]     Closed left hip fracture, initial encounter ([facility] [S72.002A] 04/15/2019    Mixed hyperlipidemia [E78.2] 04/15/2019    Acquired hypothyroidism [E03.9] 04/15/2019    Other specified glaucoma [H40.89] 04/15/2019    Irritable bowel syndrome with diarrhea [K58.0] 04/15/2019     Acquired hypothyroidism - stable; continue Levothyroxine    Hyperlipidemia - No current evidence of Rhabdomyolysis or other adverse effects.  Continue statin therapy while in the hospital        DVT Prophylaxis: Lovenox  Diet: DIET GENERAL;  Dietary Nutrition Supplements: Standard High Calorie Oral Supplement  Code Status: Full Code    PT/OT Eval Status: Ordered    Dispo - continue inpatient until symptoms improve, likely discharged to skilled nurse facility tomorrow if able to wean off oxygen, PT OT evaluation and passed voiding trial    Meghann Araya MD  Hospitalist

## 2019-04-17 NOTE — PROGRESS NOTES
Physical Therapy    Facility/Department: [address] ORTHOPEDICS  Treatment note    NAME: Otf Rondon  : 1935  MRN: [id number]    Date of Service: 2019    Discharge Recommendations:  Otf Rondon scored a / on the AM-PAC short mobility form. Current research shows that an AM-PAC score of 17 or less is typically not associated with a discharge to the patient's home setting. Based on the patients AM-PAC score and their current functional mobility deficits, it is recommended that the patient have 3-5 sessions per week of Physical Therapy at d/c to increase the patients independence. Assessment   Body structures, Functions, Activity limitations: Decreased functional mobility ; Decreased ADL status; Decreased balance;Decreased strength  Activity Tolerance  Activity Tolerance: Patient Tolerated treatment well       Patient Diagnosis(es): The encounter diagnosis was Closed fracture of left hip, initial encounter ([facility] has a [facility] medical [facility] of [facility] Glaucoma, Hyperlipidemia, IBS (irritable bowel syndrome), Seasonal allergies, Thyroid disease, and Weight loss. has a past surgical history that includes eye surgery; cyst removal; Upper gastrointestinal endoscopy (10/18/2016); Colonoscopy (2018); and pr colonoscopy flx dx w/collj spec when pfrmd (N/A, 2018). Restrictions  Restrictions/Precautions  Restrictions/Precautions: Weight Bearing, Fall Risk  Lower Extremity Weight Bearing Restrictions  Left Lower Extremity Weight Bearing: Partial Weight Bearing  Partial Weight Bearing Percentage Or Pounds: 25%  Vision/Hearing  Vision: Impaired  Vision Exceptions: Wears glasses for reading  Hearing: Exceptions to [facility]  Hearing Exceptions: Hard of hearing/hearing concerns     Subjective  General  Chart Reviewed:  Yes  Additional Pertinent Hx: here due to a fall at home with left hip fracture    PMH includes severe DJD bilateral shoulders  Response To Previous Treatment: Patient with no

## 2019-04-18 ENCOUNTER — TELEPHONE (OUTPATIENT)
Dept: INTERNAL MEDICINE CLINIC | Age: 84
End: 2019-04-18

## 2019-04-18 VITALS
OXYGEN SATURATION: 95 % | WEIGHT: 164.46 LBS | TEMPERATURE: 98.3 F | SYSTOLIC BLOOD PRESSURE: 144 MMHG | HEART RATE: 73 BPM | BODY MASS INDEX: 29.14 KG/M2 | DIASTOLIC BLOOD PRESSURE: 79 MMHG | HEIGHT: 63 IN | RESPIRATION RATE: 16 BRPM

## 2019-04-18 PROCEDURE — 2580000003 HC RX 258: Performed by: ORTHOPAEDIC SURGERY

## 2019-04-18 PROCEDURE — 6370000000 HC RX 637 (ALT 250 FOR IP): Performed by: ORTHOPAEDIC SURGERY

## 2019-04-18 PROCEDURE — 97530 THERAPEUTIC ACTIVITIES: CPT

## 2019-04-18 PROCEDURE — 94760 N-INVAS EAR/PLS OXIMETRY 1: CPT

## 2019-04-18 PROCEDURE — 6370000000 HC RX 637 (ALT 250 FOR IP): Performed by: NURSE PRACTITIONER

## 2019-04-18 PROCEDURE — 93010 ELECTROCARDIOGRAM REPORT: CPT | Performed by: INTERNAL MEDICINE

## 2019-04-18 PROCEDURE — 97535 SELF CARE MNGMENT TRAINING: CPT

## 2019-04-18 RX ORDER — PSEUDOEPHEDRINE HCL 30 MG
100 TABLET ORAL 2 TIMES DAILY
DISCHARGE
Start: 2019-04-18 | End: 2021-10-13

## 2019-04-18 RX ADMIN — HYDROCODONE BITARTRATE AND ACETAMINOPHEN 1 TABLET: 5; 325 TABLET ORAL at 11:22

## 2019-04-18 RX ADMIN — Medication 10 ML: at 08:21

## 2019-04-18 RX ADMIN — DOCUSATE SODIUM 100 MG: 100 CAPSULE, LIQUID FILLED ORAL at 08:21

## 2019-04-18 ASSESSMENT — PAIN SCALES - GENERAL
PAINLEVEL_OUTOF10: 0
PAINLEVEL_OUTOF10: 8
PAINLEVEL_OUTOF10: 0
PAINLEVEL_OUTOF10: 4

## 2019-04-18 ASSESSMENT — PAIN - FUNCTIONAL ASSESSMENT
PAIN_FUNCTIONAL_ASSESSMENT: PREVENTS OR INTERFERES SOME ACTIVE ACTIVITIES AND ADLS
PAIN_FUNCTIONAL_ASSESSMENT: PREVENTS OR INTERFERES SOME ACTIVE ACTIVITIES AND ADLS

## 2019-04-18 ASSESSMENT — PAIN DESCRIPTION - ONSET
ONSET: ON-GOING
ONSET: ON-GOING

## 2019-04-18 ASSESSMENT — PAIN DESCRIPTION - LOCATION
LOCATION: HIP;LEG
LOCATION: HIP;LEG

## 2019-04-18 ASSESSMENT — PAIN DESCRIPTION - PROGRESSION
CLINICAL_PROGRESSION: GRADUALLY IMPROVING
CLINICAL_PROGRESSION: GRADUALLY WORSENING

## 2019-04-18 ASSESSMENT — PAIN DESCRIPTION - PAIN TYPE
TYPE: SURGICAL PAIN
TYPE: SURGICAL PAIN

## 2019-04-18 ASSESSMENT — PAIN DESCRIPTION - FREQUENCY
FREQUENCY: CONTINUOUS
FREQUENCY: CONTINUOUS

## 2019-04-18 ASSESSMENT — PAIN DESCRIPTION - ORIENTATION
ORIENTATION: LEFT
ORIENTATION: LEFT

## 2019-04-18 ASSESSMENT — PAIN DESCRIPTION - DESCRIPTORS
DESCRIPTORS: ACHING
DESCRIPTORS: ACHING

## 2019-04-18 NOTE — PLAN OF CARE
Will monitor skin and mucous members. Will turn patient every 2 hours, monitor for friction and sheering, and change dressings as needed. Will preform skin assessment every shift. Fall risk assessment completed. Fall precautions in place. Call light within reach. Pt educated on calling for assistance before getting up. Walkway free of clutter. Will continue to monitor. Pt assessed for pain. Pt denies any pain at this time. Will continue to monitor pt and assess for pain throughout rest of shift.

## 2019-04-18 NOTE — PROGRESS NOTES
Occupational Therapy  Facility/Department: [address] ORTHOPEDICS  Daily Treatment Note  NAME: Hernando Her  : 1935  MRN: [id number]    Date of Service: 2019    Discharge Recommendations:  3-5 sessions per week       Hernando Her scored a 15/24 on the AM-PAC ADL Inpatient form. Current research shows that an AM-PAC score of 17 or less is typically not associated with a discharge to the patient's home setting. Based on the patients AM-PAC score and their current ADL deficits, it is recommended that the patient have 3-5 sessions per week of Occupational Therapy at d/c to increase the patients independence. Assessment: Discussed with OTR am pac score is 15 which indicates need for continued skilled OT to increase [place] and decrease caregiver burden. Sit to stand to bed rail with Mod A of 2. Able to stand to bed rail with Mod A of 1 with Left LE on this writer's foot to monitior 25% WB. Continue with POC. Patient Diagnosis(es): The encounter diagnosis was Closed fracture of left hip, initial encounter ([facility] has a past medical history of Cancer ([facility] Glaucoma, Hyperlipidemia, IBS (irritable bowel syndrome), Seasonal allergies, Thyroid disease, and Weight loss. has a past surgical history that includes eye surgery; cyst removal; Upper gastrointestinal endoscopy (10/18/2016); Colonoscopy (2018); pr colonoscopy flx dx w/collj spec when pfrmd (N/A, 2018); and hip pinning (Left, 2019).     Restrictions  Restrictions/Precautions  Restrictions/Precautions: Weight Bearing, Fall Risk  Lower Extremity Weight Bearing Restrictions  Left Lower Extremity Weight Bearing: Partial Weight Bearing  Partial Weight Bearing Percentage Or Pounds: 25%  Subjective   General  Chart Reviewed: Yes  Patient assessed for rehabilitation services?: Yes  Additional Pertinent Hx: Per Leydi Irene APRN-CNP 4/15/19: Pt is \"80 y.o. female with PMHx of Glaucoma, hyperlipidemia, IBS, hypothyroidism and recent weight loss  presented to [facility] with Mechanical fall. Patient reports that she was walking into her garage and there is uneven concrete. She tripped over that landing on her left hip. She denies hitting her head and no other injuries. X-rays confirmed left hip fracture. \"  Response to previous treatment: Patient with no complaints from previous session  Family / Caregiver Present: Yes()  Referring Practitioner: Claude Motta MD   Diagnosis: Closed L hip fracture   Subjective  Subjective: Patient seated in recliner chair upon arrival to room, reports pain 8/10, nurse present and gives pain medicine      Orientation  Orientation  Overall Orientation Status: Within Functional Limits  Objective    ADL  LE Dressing: Dependent/Total(don clean briefs)  Toileting: Dependent/Total(incont of urine, dependent for daisy care)        Balance  Sitting Balance: Independent(in recliner chair )  Standing Balance: Moderate assistance  Standing Balance  Activity: Standing in front of bed rail,cues for 25% WB  Sit to stand: 2 Person assistance(Mod A of 2)  Stand to sit: 2 Person assistance(Mod A of 2)  Functional Mobility  Functional Mobility Comments: Pt unsafe to attempt functional mobility at this time d/t 25% WBing precautions. Stood to bed rail 2 times, able to maintain 25% WBing with this writer's foot under her LLE with cues            Cognition  Overall Cognitive Status: WFL           SECOND SESSION:  Patient seated in recliner chair with request for commode. Sit to stand to jeanna no with Mod A of 2. Transfer to [facility] over commode and to hospital bed with assist of 2 with jeanna no. Dependent for toileting and to change briefs. Patient completes bed mobility with Mod A of 2. Patient left in bed with needs in reach. Assessment   Performance deficits / Impairments: Decreased functional mobility ; Decreased strength;Decreased endurance;Decreased ADL status; Decreased balance  Assessment: Discussed with OTR am status

## 2019-04-18 NOTE — PROGRESS NOTES
LE  -reports she is feeling better overall and pain is tolerable with current pain medications she is receiving  Social/Functional History  Social/Functional History  Lives With: Spouse  Type of Home: House  Home Layout: Laundry in basement, Two level([employer] to basement and upper level )  Home Access: Stairs to enter without rails  Entrance Stairs - Number of Steps: 1 + 1   Bathroom Shower/Tub: Walk-in shower, Shower chair with back  [employer]: Handicap height  Bathroom Equipment: Grab bars in shower, Hand-held shower  Home Equipment: Rolling walker, Cane  Receives Help From: Family  ADL Assistance: Independent  Homemaking Assistance: Independent(Shares with  )  Ambulation Assistance: Independent  Transfer Assistance: Independent  Objective  -patient OOB to the recliner at arrival   -bed mobility not observed at this session  Transfers  Sit to Stand: Moderate Assistance;2 Person Assistance(second assist to help assure PWB restriction left LE)  Stand to sit: Moderate Assistance;2 Person Assistance  Ambulation  Ambulation?: No  Stairs?: No  Balance  Comments: remains midline in sitting on the recliner with close SBA for safety    static stance with hold to bed rail with both hands at mod assist and assist of second to assure PWB left LE  Exercises  Ankle Pumps: 30 per hour in easy pace  Comments: her  is helping her with the [address]  Times per week: qd while on acute for several days   Current Treatment Recommendations: Functional Mobility Training, Transfer Training, Modalities, Positioning, Patient/Caregiver Education & Training, ADL/Self-care Training  Safety Devices  Type of devices:  All fall risk precautions in place, Call light within reach, Chair alarm in place, Left in chair, Nurse notified(Yennifer)      AM-PAC Score  AM-PAC Inpatient Mobility Raw Score : 9  AM-PAC Inpatient T-Scale Score : 30.55  Mobility Inpatient CMS 0-100% Score: 81.38  Mobility Inpatient CMS G-Code Modifier : CM          Goals  Short term goals  Time Frame for Short term goals: 1-2 days   Short term goal 1: bed mobility at mod assist   Short term goal 2: transfers at mod assist of 2 with 25% PWB left LE  Short term goal 3: ambulation at mod assist of 2 in 25% PWB left LE for 5-8 feet  Patient Goals   Patient goals : pain relief and get home       Therapy Time   Individual Concurrent Group Co-treatment   Time In           Time Out [address] [place], PT

## 2019-04-18 NOTE — CARE COORDINATION
Pt discharged to [facility] today. [facility] to transport at [address] obtained. PAS completed.     PH# for Report to [facility]  [phone]  Fax# to [phone] [facility] [phone]  Electronically signed by Torrey Bhatt on 4/18/2019 at 10:25 AM

## 2019-04-18 NOTE — PLAN OF CARE
Problem: Pain:  Goal: Pain level will decrease  Description  Pain level will decrease  4/18/2019 0735 by Enio Schwarz RN  Outcome: Ongoing  Note:   Pain level will decrease. Electronically signed by Enio Schwarz RN on 4/18/2019 at 7:35 AM    4/17/2019 2321 by Nayeli Waite RN  Outcome: Ongoing  Goal: Control of acute pain  Description  Control of acute pain  4/18/2019 0735 by Enio Schwarz RN  Outcome: Ongoing  Note:   Control of acute pain. Electronically signed by Enio Schwarz RN on 4/18/2019 at 7:35 AM    4/17/2019 2321 by Nayeli Waite RN  Outcome: Ongoing  Goal: Control of chronic pain  Description  Control of chronic pain  4/18/2019 0735 by Enio Schwarz RN  Outcome: Ongoing  Note:   Control of chronic pain. Electronically signed by Enio Schwarz RN on 4/18/2019 at 7:35 AM    4/17/2019 2321 by Nayeli Waite RN  Outcome: Ongoing     Problem: Risk for Impaired Skin Integrity  Goal: Tissue integrity - skin and mucous membranes  Description  Structural intactness and normal physiological function of skin and  mucous membranes. 4/18/2019 0735 by Enio Schwarz RN  Outcome: Ongoing  Note:   Structural intactness of mucous membranes. Electronically signed by Enoi Schwarz RN on 4/18/2019 at 7:35 AM    4/17/2019 2321 by Nayeli Waite RN  Outcome: Ongoing     Problem: Falls - Risk of:  Goal: Will remain free from falls  Description  Will remain free from falls  4/18/2019 0735 by Enio Schwarz RN  Outcome: Ongoing  Note:   Will remain free from falls. Electronically signed by Enio Schwarz RN on 4/18/2019 at 7:36 AM    4/17/2019 2321 by Nayeli Waite RN  Outcome: Ongoing  Goal: Absence of physical injury  Description  Absence of physical injury  4/18/2019 0735 by Enio Schwarz RN  Outcome: Ongoing  Note:   Absence of physical injury.   Electronically signed by Enio Schwarz RN on 4/18/2019 at 7:36 AM    4/17/2019 2321 by Nayeli Waite RN  Outcome: Ongoing

## 2019-04-18 NOTE — PROGRESS NOTES
Pt sitting up in chair finishing breakfast, denies pain at this time states her left hip bothers her only when shes up and moving, resp e/e, no s/s distress, call  Light in reach.   Electronically signed by Larissa Silvestre RN on 4/18/2019 at 8:23 AM

## 2019-04-19 LAB
EKG ATRIAL RATE: 83 BPM
EKG DIAGNOSIS: NORMAL
EKG P AXIS: 113 DEGREES
EKG P-R INTERVAL: 144 MS
EKG Q-T INTERVAL: 350 MS
EKG QRS DURATION: 86 MS
EKG QTC CALCULATION (BAZETT): 411 MS
EKG R AXIS: 151 DEGREES
EKG T AXIS: 141 DEGREES
EKG VENTRICULAR RATE: 83 BPM

## 2019-05-01 ENCOUNTER — OFFICE VISIT (OUTPATIENT)
Dept: ORTHOPEDIC SURGERY | Age: 84
End: 2019-05-01

## 2019-05-01 VITALS — BODY MASS INDEX: 30.18 KG/M2 | HEIGHT: 62 IN | WEIGHT: 164 LBS

## 2019-05-01 DIAGNOSIS — S72.002A LEFT DISPLACED FEMORAL NECK FRACTURE (HCC): Primary | ICD-10-CM

## 2019-05-01 PROCEDURE — APPNB15 APP NON BILLABLE TIME 0-15 MINS: Performed by: NURSE PRACTITIONER

## 2019-05-01 PROCEDURE — 99024 POSTOP FOLLOW-UP VISIT: CPT | Performed by: NURSE PRACTITIONER

## 2019-05-02 ENCOUNTER — TELEPHONE (OUTPATIENT)
Dept: ORTHOPEDIC SURGERY | Age: 84
End: 2019-05-02

## 2019-05-02 NOTE — TELEPHONE ENCOUNTER
[facility] is calling to see if we would fax over the office notes asap.   The fax number is [phone]

## 2019-05-02 NOTE — PROGRESS NOTES
DIAGNOSIS:  Left femur valgus impacted neck fracture, status post Hip pinning with cannulated screws. DATE OF SURGERY:  4/16/2019 . HISTORY OF PRESENT ILLNESS: Ms. Edita Salgado 80 y.o.  female  who came in today for 2 weeks postoperative visit. The patient denies any significant pain in the left  Hip. Rates pain a 0-1/10 VAS mild, aching, intermittent and are improving. Aggravating factors walking. Alleviating factors elevation and rest. She has been working on ROM and 25% WB. No numbness or tingling sensation. No fever or Chills. PHYSICAL EXAMINATION:  The incision is healed well, left hip. No signs of any erythema or drainage. She has no pain with the active or passive range of motion of the left  hip. She has intact sensation, distally, and  is neurovascularly intact. IMAGING:  Two views left hip and femur, and AP pelvis taken today in the office showed good alignment of the impacted femoral neck fracture, 3 cannulated screws in good position, no loosening, or hardware failure. IMPRESSION:  2 weeks out from left Hip pinning with cannulated screws, and doing very well. PLAN:  I have told the patient to work on ROM with  PT, 50% WB as well as strengthening exercises. The patient will come back for a follow up in 6 weeks. At that time, we will take Two views left  Hip, and AP pelvis. As this patient has demonstrated risk factors for osteoporosis, such as age greater than [de-identified] years and evidence of a fracture, I have referred the patient back to the primary care physician for evaluation for osteoporosis, including consideration for DEXA scanning, if this is felt to be clinically indicated. The patient is advised to contact the primary care physician to follow-up for further evaluation.        Rony Rangel, APRN - CNP

## 2019-05-10 NOTE — DISCHARGE SUMMARY
5/1/2019  Radiology exam is complete. No Radiologist dictation. Please follow up with ordering provider. Other Significant Diagnostic Studies: As described above    Treatments: As described above    Disposition: SNF    Discharge Medications:     Meliza Bianchi   Home Medication Instructions AUX:[id number]    Printed on:05/10/19 0028   Medication Information                      aspirin 325 MG EC tablet  Take 1 tablet by mouth daily Take for 30 days after surgery for DVT blood clot prophylaxis             atorvastatin (LIPITOR) 40 MG tablet  Take 40 mg by mouth daily              bimatoprost (LUMIGAN) 0.01 % SOLN ophthalmic drops  Place 1 drop into both eyes nightly              brinzolamide-brimonidine 1-0.2 % SUSP  Place 1 drop into both eyes 2 times daily              conjugated estrogens (PREMARIN) 0.625 MG/GM vaginal cream  Place 0.5 g vaginally Twice a Week              CRANBERRY PO  Take by mouth daily             docusate sodium (COLACE, DULCOLAX) 100 MG CAPS  Take 100 mg by mouth 2 times daily             levothyroxine (SYNTHROID) 75 MCG tablet  Take 75 mcg by mouth Daily              nystatin 410011 UNIT/GM POWD  Apply topically 2 times daily as needed              omeprazole (PRILOSEC) 20 MG delayed release capsule  Take 20 mg by mouth daily              oxybutynin (DITROPAN-XL) 10 MG extended release tablet  Take 10 mg by mouth daily             triamcinolone (KENALOG) 0.1 % ointment  Apply topically 2 times daily as needed              trimethoprim (TRIMPEX) 100 MG tablet  Take 100 mg by mouth daily              vitamin D (CHOLECALCIFEROL) 1000 UNIT TABS tablet  Take 1,000 Units by mouth daily                 35 Minutes spent on patient evaluation, counseling and discharge planning.      Signed:  Su Llanes MD  5/10/2019, 12:28 AM

## 2019-06-19 ENCOUNTER — OFFICE VISIT (OUTPATIENT)
Dept: ORTHOPEDIC SURGERY | Age: 84
End: 2019-06-19

## 2019-06-19 VITALS — WEIGHT: 164 LBS | BODY MASS INDEX: 30.18 KG/M2 | HEIGHT: 62 IN | RESPIRATION RATE: 16 BRPM

## 2019-06-19 DIAGNOSIS — S72.002A LEFT DISPLACED FEMORAL NECK FRACTURE (HCC): Primary | ICD-10-CM

## 2019-06-19 PROCEDURE — 99024 POSTOP FOLLOW-UP VISIT: CPT | Performed by: NURSE PRACTITIONER

## 2019-06-19 PROCEDURE — APPNB30 APP NON BILLABLE TIME 0-30 MINS: Performed by: NURSE PRACTITIONER

## 2019-06-20 ENCOUNTER — TELEPHONE (OUTPATIENT)
Dept: ORTHOPEDIC SURGERY | Age: 84
End: 2019-06-20

## 2019-06-21 ENCOUNTER — TELEPHONE (OUTPATIENT)
Dept: ORTHOPEDIC SURGERY | Age: 84
End: 2019-06-21

## 2019-06-21 NOTE — TELEPHONE ENCOUNTER
Maggie Laboy reevaluated  patient due to her weight bearing status. Would like orders for PT 2/week 3 weeks.

## 2019-07-10 ENCOUNTER — TELEPHONE (OUTPATIENT)
Dept: ORTHOPEDIC SURGERY | Age: 84
End: 2019-07-10

## 2019-07-31 ENCOUNTER — OFFICE VISIT (OUTPATIENT)
Dept: ORTHOPEDIC SURGERY | Age: 84
End: 2019-07-31
Payer: MEDICARE

## 2019-07-31 VITALS — HEIGHT: 62 IN | BODY MASS INDEX: 30.18 KG/M2 | WEIGHT: 164 LBS

## 2019-07-31 DIAGNOSIS — S72.002A LEFT DISPLACED FEMORAL NECK FRACTURE (HCC): Primary | ICD-10-CM

## 2019-07-31 PROCEDURE — G8417 CALC BMI ABV UP PARAM F/U: HCPCS | Performed by: ORTHOPAEDIC SURGERY

## 2019-07-31 PROCEDURE — G8400 PT W/DXA NO RESULTS DOC: HCPCS | Performed by: ORTHOPAEDIC SURGERY

## 2019-07-31 PROCEDURE — G8427 DOCREV CUR MEDS BY ELIG CLIN: HCPCS | Performed by: ORTHOPAEDIC SURGERY

## 2019-07-31 PROCEDURE — 1123F ACP DISCUSS/DSCN MKR DOCD: CPT | Performed by: ORTHOPAEDIC SURGERY

## 2019-07-31 PROCEDURE — 99213 OFFICE O/P EST LOW 20 MIN: CPT | Performed by: ORTHOPAEDIC SURGERY

## 2019-07-31 PROCEDURE — 1036F TOBACCO NON-USER: CPT | Performed by: ORTHOPAEDIC SURGERY

## 2019-07-31 PROCEDURE — 4040F PNEUMOC VAC/ADMIN/RCVD: CPT | Performed by: ORTHOPAEDIC SURGERY

## 2019-07-31 PROCEDURE — 1090F PRES/ABSN URINE INCON ASSESS: CPT | Performed by: ORTHOPAEDIC SURGERY

## 2019-08-01 NOTE — PROGRESS NOTES
 omeprazole (PRILOSEC) 20 MG delayed release capsule Take 20 mg by mouth daily        No current facility-administered medications on file prior to visit. Pertinent items are noted in HPI  Review of systems reviewed from Patient History Form dated on 5/1/2019 and available in the patient's chart under the Media tab. No change noted. PHYSICAL EXAMINATION:  Ms. Alysa Barber is a very pleasant 80 y.o.  female who presents today in no acute distress, awake, alert, and oriented. She is well dressed, nourished and  groomed. Patient with normal affect. Height is  5' 2\" (1.575 m), weight is 164 lb (74.4 kg), Body mass index is 30 kg/m². Resting respiratory rate is 16. The patient walks with mild limp using a walker. The incision is healed well, left hip. No signs of any erythema or drainage. She has no pain with the active or passive range of motion of the left  hip. She has intact sensation, distally, and  is neurovascularly intact. Ankle reflex 1+ bilaterally. Good strength, and no instability both upper and lower extremities. IMAGING:  Two views left hip and AP pelvis taken today in the office showed good alignment of the impacted femoral neck fracture, 3 cannulated screws in good position, no loosening, or hardware failure. No signs of AVN. IMPRESSION:  3 months out from left hip pinning with cannulated screws, and doing very well. PLAN:  I have told the patient to work on ROM that was given to her by PT,  WBAT as well as strengthening exercises. She can go back to normal activity with no restrictions. I told the patient that it is not unusual to have some achy pain and swelling for up to a year after a fracture. The patient will come back for a follow up in 3 months. At that time, we will take Two views left hip, and AP pelvis.     As this patient has demonstrated risk factors for osteoporosis, such as age greater than [de-identified] years and evidence of a fracture, I have referred the

## 2019-08-02 PROBLEM — S72.002A LEFT DISPLACED FEMORAL NECK FRACTURE (HCC): Status: ACTIVE | Noted: 2019-08-02

## 2019-10-10 ENCOUNTER — APPOINTMENT (OUTPATIENT)
Dept: CT IMAGING | Age: 84
DRG: 640 | End: 2019-10-10
Payer: MEDICARE

## 2019-10-10 ENCOUNTER — HOSPITAL ENCOUNTER (INPATIENT)
Age: 84
LOS: 7 days | Discharge: HOME | DRG: 640 | End: 2019-10-17
Attending: EMERGENCY MEDICINE | Admitting: INTERNAL MEDICINE
Payer: MEDICARE

## 2019-10-10 ENCOUNTER — APPOINTMENT (OUTPATIENT)
Dept: GENERAL RADIOLOGY | Age: 84
DRG: 640 | End: 2019-10-10
Payer: MEDICARE

## 2019-10-10 DIAGNOSIS — E87.1 HYPONATREMIA: ICD-10-CM

## 2019-10-10 DIAGNOSIS — A41.9 SEPSIS, DUE TO UNSPECIFIED ORGANISM, UNSPECIFIED WHETHER ACUTE ORGAN DYSFUNCTION PRESENT (HCC): Primary | ICD-10-CM

## 2019-10-10 DIAGNOSIS — N30.00 ACUTE CYSTITIS WITHOUT HEMATURIA: ICD-10-CM

## 2019-10-10 PROBLEM — G93.41 ACUTE METABOLIC ENCEPHALOPATHY: Status: ACTIVE | Noted: 2019-10-10

## 2019-10-10 LAB
A/G RATIO: 1.3 (ref 1.1–2.2)
ALBUMIN SERPL-MCNC: 4.1 G/DL (ref 3.4–5)
ALBUMIN SERPL-MCNC: 4.4 G/DL (ref 3.4–5)
ALP BLD-CCNC: 135 U/L (ref 40–129)
ALT SERPL-CCNC: 10 U/L (ref 10–40)
ANION GAP SERPL CALCULATED.3IONS-SCNC: 14 MMOL/L (ref 3–16)
ANION GAP SERPL CALCULATED.3IONS-SCNC: 14 MMOL/L (ref 3–16)
AST SERPL-CCNC: 20 U/L (ref 15–37)
BACTERIA: ABNORMAL /HPF
BASOPHILS ABSOLUTE: 0.1 K/UL (ref 0–0.2)
BASOPHILS RELATIVE PERCENT: 0.9 %
BILIRUB SERPL-MCNC: 0.8 MG/DL (ref 0–1)
BILIRUBIN URINE: NEGATIVE
BLOOD, URINE: NEGATIVE
BUN BLDV-MCNC: 7 MG/DL (ref 7–20)
BUN BLDV-MCNC: 9 MG/DL (ref 7–20)
CALCIUM SERPL-MCNC: 9.1 MG/DL (ref 8.3–10.6)
CALCIUM SERPL-MCNC: 9.3 MG/DL (ref 8.3–10.6)
CHLORIDE BLD-SCNC: 82 MMOL/L (ref 99–110)
CHLORIDE BLD-SCNC: 87 MMOL/L (ref 99–110)
CLARITY: ABNORMAL
CO2: 23 MMOL/L (ref 21–32)
CO2: 24 MMOL/L (ref 21–32)
COLOR: YELLOW
CREAT SERPL-MCNC: <0.5 MG/DL (ref 0.6–1.2)
CREAT SERPL-MCNC: <0.5 MG/DL (ref 0.6–1.2)
EKG ATRIAL RATE: 64 BPM
EKG DIAGNOSIS: NORMAL
EKG P AXIS: 73 DEGREES
EKG P-R INTERVAL: 140 MS
EKG Q-T INTERVAL: 402 MS
EKG QRS DURATION: 78 MS
EKG QTC CALCULATION (BAZETT): 414 MS
EKG R AXIS: 29 DEGREES
EKG T AXIS: 43 DEGREES
EKG VENTRICULAR RATE: 64 BPM
EOSINOPHILS ABSOLUTE: 0.1 K/UL (ref 0–0.6)
EOSINOPHILS RELATIVE PERCENT: 0.9 %
EPITHELIAL CELLS, UA: ABNORMAL /HPF
GFR AFRICAN AMERICAN: >60
GFR AFRICAN AMERICAN: >60
GFR NON-AFRICAN AMERICAN: >60
GFR NON-AFRICAN AMERICAN: >60
GLOBULIN: 3.4 G/DL
GLUCOSE BLD-MCNC: 114 MG/DL (ref 70–99)
GLUCOSE BLD-MCNC: 181 MG/DL (ref 70–99)
GLUCOSE BLD-MCNC: 99 MG/DL (ref 70–99)
GLUCOSE URINE: NEGATIVE MG/DL
HCT VFR BLD CALC: 37.1 % (ref 36–48)
HEMOGLOBIN: 12.6 G/DL (ref 12–16)
KETONES, URINE: NEGATIVE MG/DL
LACTIC ACID: 1 MMOL/L (ref 0.4–2)
LACTIC ACID: 1.2 MMOL/L (ref 0.4–2)
LACTIC ACID: 2.3 MMOL/L (ref 0.4–2)
LEUKOCYTE ESTERASE, URINE: NEGATIVE
LYMPHOCYTES ABSOLUTE: 1.3 K/UL (ref 1–5.1)
LYMPHOCYTES RELATIVE PERCENT: 19.1 %
MCH RBC QN AUTO: 28.2 PG (ref 26–34)
MCHC RBC AUTO-ENTMCNC: 34 G/DL (ref 31–36)
MCV RBC AUTO: 83 FL (ref 80–100)
MICROSCOPIC EXAMINATION: YES
MONOCYTES ABSOLUTE: 0.4 K/UL (ref 0–1.3)
MONOCYTES RELATIVE PERCENT: 6.4 %
NEUTROPHILS ABSOLUTE: 5 K/UL (ref 1.7–7.7)
NEUTROPHILS RELATIVE PERCENT: 72.7 %
NITRITE, URINE: POSITIVE
PDW BLD-RTO: 13.9 % (ref 12.4–15.4)
PERFORMED ON: ABNORMAL
PH UA: 7.5 (ref 5–8)
PHOSPHORUS: 2.9 MG/DL (ref 2.5–4.9)
PLATELET # BLD: 261 K/UL (ref 135–450)
PMV BLD AUTO: 7.9 FL (ref 5–10.5)
POTASSIUM SERPL-SCNC: 3.8 MMOL/L (ref 3.5–5.1)
POTASSIUM SERPL-SCNC: 4.1 MMOL/L (ref 3.5–5.1)
PROTEIN UA: NEGATIVE MG/DL
RBC # BLD: 4.47 M/UL (ref 4–5.2)
RBC UA: ABNORMAL /HPF (ref 0–2)
SODIUM BLD-SCNC: 120 MMOL/L (ref 136–145)
SODIUM BLD-SCNC: 124 MMOL/L (ref 136–145)
SPECIFIC GRAVITY UA: 1.01 (ref 1–1.03)
TOTAL PROTEIN: 7.8 G/DL (ref 6.4–8.2)
TSH SERPL DL<=0.05 MIU/L-ACNC: 1.18 UIU/ML (ref 0.27–4.2)
URINE REFLEX TO CULTURE: YES
URINE TYPE: ABNORMAL
UROBILINOGEN, URINE: 0.2 E.U./DL
WBC # BLD: 6.9 K/UL (ref 4–11)
WBC UA: ABNORMAL /HPF (ref 0–5)

## 2019-10-10 PROCEDURE — 71045 X-RAY EXAM CHEST 1 VIEW: CPT

## 2019-10-10 PROCEDURE — 83930 ASSAY OF BLOOD OSMOLALITY: CPT

## 2019-10-10 PROCEDURE — 96361 HYDRATE IV INFUSION ADD-ON: CPT

## 2019-10-10 PROCEDURE — 87186 SC STD MICRODIL/AGAR DIL: CPT

## 2019-10-10 PROCEDURE — 36415 COLL VENOUS BLD VENIPUNCTURE: CPT

## 2019-10-10 PROCEDURE — 2580000003 HC RX 258: Performed by: EMERGENCY MEDICINE

## 2019-10-10 PROCEDURE — 99285 EMERGENCY DEPT VISIT HI MDM: CPT

## 2019-10-10 PROCEDURE — 70498 CT ANGIOGRAPHY NECK: CPT

## 2019-10-10 PROCEDURE — 83605 ASSAY OF LACTIC ACID: CPT

## 2019-10-10 PROCEDURE — 84300 ASSAY OF URINE SODIUM: CPT

## 2019-10-10 PROCEDURE — 87040 BLOOD CULTURE FOR BACTERIA: CPT

## 2019-10-10 PROCEDURE — 6360000004 HC RX CONTRAST MEDICATION: Performed by: EMERGENCY MEDICINE

## 2019-10-10 PROCEDURE — 6360000002 HC RX W HCPCS: Performed by: EMERGENCY MEDICINE

## 2019-10-10 PROCEDURE — 81001 URINALYSIS AUTO W/SCOPE: CPT

## 2019-10-10 PROCEDURE — 85025 COMPLETE CBC W/AUTO DIFF WBC: CPT

## 2019-10-10 PROCEDURE — 83935 ASSAY OF URINE OSMOLALITY: CPT

## 2019-10-10 PROCEDURE — 96375 TX/PRO/DX INJ NEW DRUG ADDON: CPT

## 2019-10-10 PROCEDURE — 93005 ELECTROCARDIOGRAM TRACING: CPT | Performed by: EMERGENCY MEDICINE

## 2019-10-10 PROCEDURE — 84443 ASSAY THYROID STIM HORMONE: CPT

## 2019-10-10 PROCEDURE — 87086 URINE CULTURE/COLONY COUNT: CPT

## 2019-10-10 PROCEDURE — 80053 COMPREHEN METABOLIC PANEL: CPT

## 2019-10-10 PROCEDURE — 93010 ELECTROCARDIOGRAM REPORT: CPT | Performed by: INTERNAL MEDICINE

## 2019-10-10 PROCEDURE — 96365 THER/PROPH/DIAG IV INF INIT: CPT

## 2019-10-10 PROCEDURE — 2580000003 HC RX 258: Performed by: INTERNAL MEDICINE

## 2019-10-10 PROCEDURE — 70450 CT HEAD/BRAIN W/O DYE: CPT

## 2019-10-10 PROCEDURE — 87077 CULTURE AEROBIC IDENTIFY: CPT

## 2019-10-10 PROCEDURE — 2060000000 HC ICU INTERMEDIATE R&B

## 2019-10-10 RX ORDER — ATORVASTATIN CALCIUM 40 MG/1
40 TABLET, FILM COATED ORAL DAILY
Status: DISCONTINUED | OUTPATIENT
Start: 2019-10-11 | End: 2019-10-17 | Stop reason: HOSPADM

## 2019-10-10 RX ORDER — SODIUM CHLORIDE 9 MG/ML
INJECTION, SOLUTION INTRAVENOUS CONTINUOUS
Status: DISCONTINUED | OUTPATIENT
Start: 2019-10-10 | End: 2019-10-11

## 2019-10-10 RX ORDER — LATANOPROST 50 UG/ML
1 SOLUTION/ DROPS OPHTHALMIC NIGHTLY
Status: DISCONTINUED | OUTPATIENT
Start: 2019-10-10 | End: 2019-10-17 | Stop reason: HOSPADM

## 2019-10-10 RX ORDER — DOCUSATE SODIUM 100 MG/1
100 CAPSULE, LIQUID FILLED ORAL 2 TIMES DAILY
Status: DISCONTINUED | OUTPATIENT
Start: 2019-10-10 | End: 2019-10-17 | Stop reason: HOSPADM

## 2019-10-10 RX ORDER — SODIUM CHLORIDE 0.9 % (FLUSH) 0.9 %
10 SYRINGE (ML) INJECTION EVERY 12 HOURS SCHEDULED
Status: DISCONTINUED | OUTPATIENT
Start: 2019-10-10 | End: 2019-10-17 | Stop reason: HOSPADM

## 2019-10-10 RX ORDER — LORAZEPAM 2 MG/ML
1 INJECTION INTRAMUSCULAR ONCE
Status: COMPLETED | OUTPATIENT
Start: 2019-10-10 | End: 2019-10-10

## 2019-10-10 RX ORDER — ONDANSETRON 2 MG/ML
4 INJECTION INTRAMUSCULAR; INTRAVENOUS EVERY 6 HOURS PRN
Status: DISCONTINUED | OUTPATIENT
Start: 2019-10-10 | End: 2019-10-17 | Stop reason: HOSPADM

## 2019-10-10 RX ORDER — SODIUM CHLORIDE 0.9 % (FLUSH) 0.9 %
10 SYRINGE (ML) INJECTION PRN
Status: DISCONTINUED | OUTPATIENT
Start: 2019-10-10 | End: 2019-10-17 | Stop reason: HOSPADM

## 2019-10-10 RX ORDER — ASPIRIN 81 MG/1
81 TABLET ORAL DAILY
Status: DISCONTINUED | OUTPATIENT
Start: 2019-10-11 | End: 2019-10-17 | Stop reason: HOSPADM

## 2019-10-10 RX ORDER — OXYBUTYNIN CHLORIDE 10 MG/1
10 TABLET, EXTENDED RELEASE ORAL DAILY
Status: DISCONTINUED | OUTPATIENT
Start: 2019-10-11 | End: 2019-10-12

## 2019-10-10 RX ORDER — ACETAMINOPHEN 325 MG/1
650 TABLET ORAL EVERY 6 HOURS PRN
Status: DISCONTINUED | OUTPATIENT
Start: 2019-10-10 | End: 2019-10-17 | Stop reason: HOSPADM

## 2019-10-10 RX ORDER — LEVOTHYROXINE SODIUM 0.07 MG/1
75 TABLET ORAL DAILY
Status: DISCONTINUED | OUTPATIENT
Start: 2019-10-11 | End: 2019-10-17 | Stop reason: HOSPADM

## 2019-10-10 RX ORDER — DOCUSATE SODIUM 100 MG/1
100 CAPSULE, LIQUID FILLED ORAL 2 TIMES DAILY
Status: DISCONTINUED | OUTPATIENT
Start: 2019-10-10 | End: 2019-10-10 | Stop reason: ALTCHOICE

## 2019-10-10 RX ORDER — 0.9 % SODIUM CHLORIDE 0.9 %
1000 INTRAVENOUS SOLUTION INTRAVENOUS ONCE
Status: COMPLETED | OUTPATIENT
Start: 2019-10-10 | End: 2019-10-10

## 2019-10-10 RX ORDER — PANTOPRAZOLE SODIUM 40 MG/1
40 TABLET, DELAYED RELEASE ORAL
Status: DISCONTINUED | OUTPATIENT
Start: 2019-10-11 | End: 2019-10-17 | Stop reason: HOSPADM

## 2019-10-10 RX ORDER — 0.9 % SODIUM CHLORIDE 0.9 %
1000 INTRAVENOUS SOLUTION INTRAVENOUS ONCE
Status: DISCONTINUED | OUTPATIENT
Start: 2019-10-10 | End: 2019-10-10

## 2019-10-10 RX ADMIN — CEFTRIAXONE 1 G: 1 INJECTION, POWDER, FOR SOLUTION INTRAMUSCULAR; INTRAVENOUS at 17:22

## 2019-10-10 RX ADMIN — IOVERSOL 100 ML: 678 INJECTION INTRA-ARTERIAL; INTRAVENOUS at 15:46

## 2019-10-10 RX ADMIN — SODIUM CHLORIDE: 9 INJECTION, SOLUTION INTRAVENOUS at 21:25

## 2019-10-10 RX ADMIN — SODIUM CHLORIDE 1000 ML: 9 INJECTION, SOLUTION INTRAVENOUS at 16:34

## 2019-10-10 RX ADMIN — SODIUM CHLORIDE: 9 INJECTION, SOLUTION INTRAVENOUS at 17:13

## 2019-10-10 RX ADMIN — LORAZEPAM 1 MG: 2 INJECTION INTRAMUSCULAR; INTRAVENOUS at 16:33

## 2019-10-10 ASSESSMENT — PAIN DESCRIPTION - PAIN TYPE
TYPE: CHRONIC PAIN
TYPE: CHRONIC PAIN

## 2019-10-10 ASSESSMENT — PAIN SCALES - GENERAL
PAINLEVEL_OUTOF10: 0
PAINLEVEL_OUTOF10: 0

## 2019-10-10 ASSESSMENT — PAIN SCALES - PAIN ASSESSMENT IN ADVANCED DEMENTIA (PAINAD)
BREATHING: 0
NEGVOCALIZATION: 0
FACIALEXPRESSION: 0
BODYLANGUAGE: 0
NEGVOCALIZATION: 0
CONSOLABILITY: 0
BREATHING: 0
CONSOLABILITY: 0
TOTALSCORE: 0
BODYLANGUAGE: 0
FACIALEXPRESSION: 0
TOTALSCORE: 0

## 2019-10-10 ASSESSMENT — PAIN DESCRIPTION - LOCATION
LOCATION: HIP;SHOULDER
LOCATION: HIP;SHOULDER

## 2019-10-10 ASSESSMENT — PAIN DESCRIPTION - ORIENTATION
ORIENTATION: LEFT
ORIENTATION: LEFT

## 2019-10-11 ENCOUNTER — APPOINTMENT (OUTPATIENT)
Dept: MRI IMAGING | Age: 84
DRG: 640 | End: 2019-10-11
Payer: MEDICARE

## 2019-10-11 LAB
ALBUMIN SERPL-MCNC: 3.9 G/DL (ref 3.4–5)
ALBUMIN SERPL-MCNC: 4.1 G/DL (ref 3.4–5)
ALBUMIN SERPL-MCNC: 4.5 G/DL (ref 3.4–5)
ALBUMIN SERPL-MCNC: 4.5 G/DL (ref 3.4–5)
ALBUMIN SERPL-MCNC: 4.6 G/DL (ref 3.4–5)
ALBUMIN SERPL-MCNC: 4.6 G/DL (ref 3.4–5)
ANION GAP SERPL CALCULATED.3IONS-SCNC: 15 MMOL/L (ref 3–16)
ANION GAP SERPL CALCULATED.3IONS-SCNC: 16 MMOL/L (ref 3–16)
ANION GAP SERPL CALCULATED.3IONS-SCNC: 18 MMOL/L (ref 3–16)
BASE EXCESS ARTERIAL: -0.2 MMOL/L (ref -3–3)
BASOPHILS ABSOLUTE: 0.1 K/UL (ref 0–0.2)
BASOPHILS RELATIVE PERCENT: 0.9 %
BUN BLDV-MCNC: 5 MG/DL (ref 7–20)
BUN BLDV-MCNC: 5 MG/DL (ref 7–20)
BUN BLDV-MCNC: 6 MG/DL (ref 7–20)
BUN BLDV-MCNC: 9 MG/DL (ref 7–20)
CALCIUM SERPL-MCNC: 9 MG/DL (ref 8.3–10.6)
CALCIUM SERPL-MCNC: 9.1 MG/DL (ref 8.3–10.6)
CALCIUM SERPL-MCNC: 9.1 MG/DL (ref 8.3–10.6)
CALCIUM SERPL-MCNC: 9.3 MG/DL (ref 8.3–10.6)
CALCIUM SERPL-MCNC: 9.4 MG/DL (ref 8.3–10.6)
CALCIUM SERPL-MCNC: 9.5 MG/DL (ref 8.3–10.6)
CARBOXYHEMOGLOBIN ARTERIAL: 1.1 % (ref 0–1.5)
CHLORIDE BLD-SCNC: 94 MMOL/L (ref 99–110)
CHLORIDE BLD-SCNC: 94 MMOL/L (ref 99–110)
CHLORIDE BLD-SCNC: 95 MMOL/L (ref 99–110)
CHLORIDE BLD-SCNC: 95 MMOL/L (ref 99–110)
CHLORIDE BLD-SCNC: 96 MMOL/L (ref 99–110)
CHLORIDE BLD-SCNC: 97 MMOL/L (ref 99–110)
CO2: 21 MMOL/L (ref 21–32)
CO2: 22 MMOL/L (ref 21–32)
CO2: 23 MMOL/L (ref 21–32)
CO2: 24 MMOL/L (ref 21–32)
CREAT SERPL-MCNC: 0.5 MG/DL (ref 0.6–1.2)
CREAT SERPL-MCNC: 0.6 MG/DL (ref 0.6–1.2)
CREAT SERPL-MCNC: <0.5 MG/DL (ref 0.6–1.2)
EOSINOPHILS ABSOLUTE: 0 K/UL (ref 0–0.6)
EOSINOPHILS RELATIVE PERCENT: 0.2 %
GFR AFRICAN AMERICAN: >60
GFR NON-AFRICAN AMERICAN: >60
GLUCOSE BLD-MCNC: 142 MG/DL (ref 70–99)
GLUCOSE BLD-MCNC: 169 MG/DL (ref 70–99)
GLUCOSE BLD-MCNC: 84 MG/DL (ref 70–99)
GLUCOSE BLD-MCNC: 86 MG/DL (ref 70–99)
GLUCOSE BLD-MCNC: 87 MG/DL (ref 70–99)
GLUCOSE BLD-MCNC: 88 MG/DL (ref 70–99)
HCO3 ARTERIAL: 22.9 MMOL/L (ref 21–29)
HCT VFR BLD CALC: 38.4 % (ref 36–48)
HEMOGLOBIN, ART, EXTENDED: 12.8 G/DL (ref 12–16)
HEMOGLOBIN: 13.1 G/DL (ref 12–16)
LACTIC ACID: 1.1 MMOL/L (ref 0.4–2)
LYMPHOCYTES ABSOLUTE: 1.3 K/UL (ref 1–5.1)
LYMPHOCYTES RELATIVE PERCENT: 14.8 %
MCH RBC QN AUTO: 28.3 PG (ref 26–34)
MCHC RBC AUTO-ENTMCNC: 34.2 G/DL (ref 31–36)
MCV RBC AUTO: 82.9 FL (ref 80–100)
METHEMOGLOBIN ARTERIAL: 0.7 %
MONOCYTES ABSOLUTE: 1 K/UL (ref 0–1.3)
MONOCYTES RELATIVE PERCENT: 11 %
NEUTROPHILS ABSOLUTE: 6.5 K/UL (ref 1.7–7.7)
NEUTROPHILS RELATIVE PERCENT: 73.1 %
O2 CONTENT ARTERIAL: 18 ML/DL
O2 SAT, ARTERIAL: 98.3 %
O2 THERAPY: ABNORMAL
OSMOLALITY URINE: 136 MOSM/KG (ref 390–1070)
OSMOLALITY: 270 MOSM/KG (ref 278–305)
PCO2 ARTERIAL: 33.7 MMHG (ref 35–45)
PDW BLD-RTO: 14.2 % (ref 12.4–15.4)
PH ARTERIAL: 7.45 (ref 7.35–7.45)
PHOSPHORUS: 3.4 MG/DL (ref 2.5–4.9)
PHOSPHORUS: 3.4 MG/DL (ref 2.5–4.9)
PHOSPHORUS: 3.6 MG/DL (ref 2.5–4.9)
PHOSPHORUS: 3.9 MG/DL (ref 2.5–4.9)
PHOSPHORUS: 3.9 MG/DL (ref 2.5–4.9)
PHOSPHORUS: 4 MG/DL (ref 2.5–4.9)
PLATELET # BLD: 272 K/UL (ref 135–450)
PMV BLD AUTO: 7.9 FL (ref 5–10.5)
PO2 ARTERIAL: 98.4 MMHG (ref 75–108)
POTASSIUM SERPL-SCNC: 3.5 MMOL/L (ref 3.5–5.1)
POTASSIUM SERPL-SCNC: 3.6 MMOL/L (ref 3.5–5.1)
POTASSIUM SERPL-SCNC: 3.6 MMOL/L (ref 3.5–5.1)
POTASSIUM SERPL-SCNC: 3.7 MMOL/L (ref 3.5–5.1)
POTASSIUM SERPL-SCNC: 3.7 MMOL/L (ref 3.5–5.1)
POTASSIUM SERPL-SCNC: 3.9 MMOL/L (ref 3.5–5.1)
RBC # BLD: 4.63 M/UL (ref 4–5.2)
SODIUM BLD-SCNC: 132 MMOL/L (ref 136–145)
SODIUM BLD-SCNC: 132 MMOL/L (ref 136–145)
SODIUM BLD-SCNC: 134 MMOL/L (ref 136–145)
SODIUM BLD-SCNC: 134 MMOL/L (ref 136–145)
SODIUM BLD-SCNC: 135 MMOL/L (ref 136–145)
SODIUM BLD-SCNC: 135 MMOL/L (ref 136–145)
SODIUM URINE: 43 MMOL/L
TCO2 ARTERIAL: 23.9 MMOL/L
URIC ACID, SERUM: 3.2 MG/DL (ref 2.6–6)
WBC # BLD: 8.9 K/UL (ref 4–11)

## 2019-10-11 PROCEDURE — 90686 IIV4 VACC NO PRSV 0.5 ML IM: CPT | Performed by: INTERNAL MEDICINE

## 2019-10-11 PROCEDURE — 2060000000 HC ICU INTERMEDIATE R&B

## 2019-10-11 PROCEDURE — 6360000002 HC RX W HCPCS: Performed by: INTERNAL MEDICINE

## 2019-10-11 PROCEDURE — G0008 ADMIN INFLUENZA VIRUS VAC: HCPCS | Performed by: INTERNAL MEDICINE

## 2019-10-11 PROCEDURE — 83605 ASSAY OF LACTIC ACID: CPT

## 2019-10-11 PROCEDURE — 2580000003 HC RX 258: Performed by: INTERNAL MEDICINE

## 2019-10-11 PROCEDURE — 84550 ASSAY OF BLOOD/URIC ACID: CPT

## 2019-10-11 PROCEDURE — 82803 BLOOD GASES ANY COMBINATION: CPT

## 2019-10-11 PROCEDURE — 85025 COMPLETE CBC W/AUTO DIFF WBC: CPT

## 2019-10-11 PROCEDURE — 80069 RENAL FUNCTION PANEL: CPT

## 2019-10-11 PROCEDURE — 2500000003 HC RX 250 WO HCPCS: Performed by: INTERNAL MEDICINE

## 2019-10-11 PROCEDURE — 36600 WITHDRAWAL OF ARTERIAL BLOOD: CPT

## 2019-10-11 PROCEDURE — 94760 N-INVAS EAR/PLS OXIMETRY 1: CPT

## 2019-10-11 PROCEDURE — 70551 MRI BRAIN STEM W/O DYE: CPT

## 2019-10-11 PROCEDURE — 6370000000 HC RX 637 (ALT 250 FOR IP): Performed by: INTERNAL MEDICINE

## 2019-10-11 PROCEDURE — 36415 COLL VENOUS BLD VENIPUNCTURE: CPT

## 2019-10-11 RX ORDER — DEXTROSE, SODIUM CHLORIDE, AND POTASSIUM CHLORIDE 5; .45; .15 G/100ML; G/100ML; G/100ML
INJECTION INTRAVENOUS CONTINUOUS
Status: DISCONTINUED | OUTPATIENT
Start: 2019-10-11 | End: 2019-10-12

## 2019-10-11 RX ORDER — NITROFURANTOIN MACROCRYSTALS 25 MG/1
25 CAPSULE ORAL 2 TIMES DAILY
Status: ON HOLD | COMMUNITY
End: 2019-10-17 | Stop reason: HOSPADM

## 2019-10-11 RX ADMIN — DOCUSATE SODIUM 100 MG: 100 CAPSULE, LIQUID FILLED ORAL at 20:00

## 2019-10-11 RX ADMIN — INFLUENZA A VIRUS A/BRISBANE/02/2018 IVR-190 (H1N1) ANTIGEN (PROPIOLACTONE INACTIVATED), INFLUENZA A VIRUS A/KANSAS/14/2017 X-327 (H3N2) ANTIGEN (PROPIOLACTONE INACTIVATED), INFLUENZA B VIRUS B/MARYLAND/15/2016 ANTIGEN (PROPIOLACTONE INACTIVATED), INFLUENZA B VIRUS B/PHUKET/3073/2013 BVR-1B ANTIGEN (PROPIOLACTONE INACTIVATED) 0.5 ML: 15; 15; 15; 15 INJECTION, SUSPENSION INTRAMUSCULAR at 18:39

## 2019-10-11 RX ADMIN — CEFTRIAXONE 1 G: 1 INJECTION, POWDER, FOR SOLUTION INTRAMUSCULAR; INTRAVENOUS at 18:49

## 2019-10-11 RX ADMIN — Medication 10 ML: at 20:02

## 2019-10-11 RX ADMIN — POTASSIUM CHLORIDE, DEXTROSE MONOHYDRATE AND SODIUM CHLORIDE: 150; 5; 450 INJECTION, SOLUTION INTRAVENOUS at 12:56

## 2019-10-11 RX ADMIN — Medication 10 ML: at 09:43

## 2019-10-11 RX ADMIN — LATANOPROST 1 DROP: 50 SOLUTION OPHTHALMIC at 20:00

## 2019-10-11 RX ADMIN — ENOXAPARIN SODIUM 40 MG: 40 INJECTION SUBCUTANEOUS at 09:43

## 2019-10-11 ASSESSMENT — PAIN SCALES - PAIN ASSESSMENT IN ADVANCED DEMENTIA (PAINAD)
BREATHING: 0
NEGVOCALIZATION: 0
BODYLANGUAGE: 0
CONSOLABILITY: 0
CONSOLABILITY: 0
BREATHING: 0
FACIALEXPRESSION: 0
CONSOLABILITY: 0
BREATHING: 0
NEGVOCALIZATION: 0
TOTALSCORE: 0
NEGVOCALIZATION: 0
BODYLANGUAGE: 0
BODYLANGUAGE: 0
TOTALSCORE: 0
TOTALSCORE: 0
BODYLANGUAGE: 0
BODYLANGUAGE: 0
TOTALSCORE: 0
BREATHING: 0
FACIALEXPRESSION: 0
CONSOLABILITY: 0
CONSOLABILITY: 0
BREATHING: 0
FACIALEXPRESSION: 0
FACIALEXPRESSION: 0
NEGVOCALIZATION: 0
FACIALEXPRESSION: 0
TOTALSCORE: 0
NEGVOCALIZATION: 0

## 2019-10-11 ASSESSMENT — PAIN SCALES - GENERAL: PAINLEVEL_OUTOF10: 0

## 2019-10-12 LAB
ALBUMIN SERPL-MCNC: 4.3 G/DL (ref 3.4–5)
ALBUMIN SERPL-MCNC: 4.3 G/DL (ref 3.4–5)
ANION GAP SERPL CALCULATED.3IONS-SCNC: 13 MMOL/L (ref 3–16)
ANION GAP SERPL CALCULATED.3IONS-SCNC: 14 MMOL/L (ref 3–16)
ANION GAP SERPL CALCULATED.3IONS-SCNC: 14 MMOL/L (ref 3–16)
BUN BLDV-MCNC: 10 MG/DL (ref 7–20)
BUN BLDV-MCNC: 10 MG/DL (ref 7–20)
BUN BLDV-MCNC: 9 MG/DL (ref 7–20)
CALCIUM SERPL-MCNC: 8.7 MG/DL (ref 8.3–10.6)
CALCIUM SERPL-MCNC: 9 MG/DL (ref 8.3–10.6)
CALCIUM SERPL-MCNC: 9.1 MG/DL (ref 8.3–10.6)
CHLORIDE BLD-SCNC: 102 MMOL/L (ref 99–110)
CHLORIDE BLD-SCNC: 97 MMOL/L (ref 99–110)
CHLORIDE BLD-SCNC: 99 MMOL/L (ref 99–110)
CO2: 23 MMOL/L (ref 21–32)
CO2: 23 MMOL/L (ref 21–32)
CO2: 24 MMOL/L (ref 21–32)
CREAT SERPL-MCNC: 0.6 MG/DL (ref 0.6–1.2)
CREAT SERPL-MCNC: 0.6 MG/DL (ref 0.6–1.2)
CREAT SERPL-MCNC: <0.5 MG/DL (ref 0.6–1.2)
GFR AFRICAN AMERICAN: >60
GFR NON-AFRICAN AMERICAN: >60
GLUCOSE BLD-MCNC: 104 MG/DL (ref 70–99)
GLUCOSE BLD-MCNC: 146 MG/DL (ref 70–99)
GLUCOSE BLD-MCNC: 96 MG/DL (ref 70–99)
PHOSPHORUS: 3.6 MG/DL (ref 2.5–4.9)
PHOSPHORUS: 3.7 MG/DL (ref 2.5–4.9)
POTASSIUM SERPL-SCNC: 4 MMOL/L (ref 3.5–5.1)
POTASSIUM SERPL-SCNC: 4 MMOL/L (ref 3.5–5.1)
POTASSIUM SERPL-SCNC: 4.4 MMOL/L (ref 3.5–5.1)
SODIUM BLD-SCNC: 134 MMOL/L (ref 136–145)
SODIUM BLD-SCNC: 136 MMOL/L (ref 136–145)
SODIUM BLD-SCNC: 139 MMOL/L (ref 136–145)

## 2019-10-12 PROCEDURE — 2060000000 HC ICU INTERMEDIATE R&B

## 2019-10-12 PROCEDURE — 2580000003 HC RX 258: Performed by: INTERNAL MEDICINE

## 2019-10-12 PROCEDURE — 93005 ELECTROCARDIOGRAM TRACING: CPT | Performed by: INTERNAL MEDICINE

## 2019-10-12 PROCEDURE — 2500000003 HC RX 250 WO HCPCS: Performed by: INTERNAL MEDICINE

## 2019-10-12 PROCEDURE — 6360000002 HC RX W HCPCS: Performed by: INTERNAL MEDICINE

## 2019-10-12 PROCEDURE — 6370000000 HC RX 637 (ALT 250 FOR IP): Performed by: INTERNAL MEDICINE

## 2019-10-12 PROCEDURE — 94760 N-INVAS EAR/PLS OXIMETRY 1: CPT

## 2019-10-12 PROCEDURE — 36415 COLL VENOUS BLD VENIPUNCTURE: CPT

## 2019-10-12 PROCEDURE — 80069 RENAL FUNCTION PANEL: CPT

## 2019-10-12 RX ORDER — CLOTRIMAZOLE AND BETAMETHASONE DIPROPIONATE 10; .64 MG/G; MG/G
CREAM TOPICAL 2 TIMES DAILY
COMMUNITY

## 2019-10-12 RX ORDER — DESMOPRESSIN ACETATE 0.1 MG/ML
1 SOLUTION NASAL DAILY
Status: ON HOLD | COMMUNITY
End: 2019-10-17 | Stop reason: HOSPADM

## 2019-10-12 RX ORDER — ERGOCALCIFEROL 1.25 MG/1
50000 CAPSULE ORAL
COMMUNITY

## 2019-10-12 RX ORDER — DEXTROSE MONOHYDRATE 50 MG/ML
INJECTION, SOLUTION INTRAVENOUS CONTINUOUS
Status: DISCONTINUED | OUTPATIENT
Start: 2019-10-12 | End: 2019-10-12

## 2019-10-12 RX ORDER — DESMOPRESSIN ACETATE 4 UG/ML
2 INJECTION, SOLUTION INTRAVENOUS; SUBCUTANEOUS EVERY 6 HOURS
Status: COMPLETED | OUTPATIENT
Start: 2019-10-12 | End: 2019-10-13

## 2019-10-12 RX ORDER — NAPROXEN SODIUM 220 MG
220 TABLET ORAL 2 TIMES DAILY PRN
COMMUNITY

## 2019-10-12 RX ADMIN — Medication 10 ML: at 10:01

## 2019-10-12 RX ADMIN — DOCUSATE SODIUM 100 MG: 100 CAPSULE, LIQUID FILLED ORAL at 20:09

## 2019-10-12 RX ADMIN — ENOXAPARIN SODIUM 40 MG: 40 INJECTION SUBCUTANEOUS at 09:54

## 2019-10-12 RX ADMIN — LATANOPROST 1 DROP: 50 SOLUTION OPHTHALMIC at 20:09

## 2019-10-12 RX ADMIN — DESMOPRESSIN ACETATE 2 MCG: 4 SOLUTION INTRAVENOUS at 20:52

## 2019-10-12 RX ADMIN — PANTOPRAZOLE SODIUM 40 MG: 40 TABLET, DELAYED RELEASE ORAL at 07:09

## 2019-10-12 RX ADMIN — POTASSIUM CHLORIDE, DEXTROSE MONOHYDRATE AND SODIUM CHLORIDE: 150; 5; 450 INJECTION, SOLUTION INTRAVENOUS at 12:25

## 2019-10-12 RX ADMIN — Medication 10 ML: at 22:17

## 2019-10-12 RX ADMIN — DOCUSATE SODIUM 100 MG: 100 CAPSULE, LIQUID FILLED ORAL at 09:54

## 2019-10-12 RX ADMIN — ASPIRIN 81 MG: 81 TABLET, COATED ORAL at 09:53

## 2019-10-12 RX ADMIN — VITAMIN D, TAB 1000IU (100/BT) 1000 UNITS: 25 TAB at 09:54

## 2019-10-12 RX ADMIN — LEVOTHYROXINE SODIUM 75 MCG: 75 TABLET ORAL at 07:09

## 2019-10-12 RX ADMIN — DEXTROSE MONOHYDRATE: 50 INJECTION, SOLUTION INTRAVENOUS at 18:47

## 2019-10-12 RX ADMIN — CEFTRIAXONE 1 G: 1 INJECTION, POWDER, FOR SOLUTION INTRAMUSCULAR; INTRAVENOUS at 17:27

## 2019-10-12 RX ADMIN — ATORVASTATIN CALCIUM 40 MG: 40 TABLET, FILM COATED ORAL at 09:54

## 2019-10-12 ASSESSMENT — PAIN SCALES - PAIN ASSESSMENT IN ADVANCED DEMENTIA (PAINAD)
BODYLANGUAGE: 0
CONSOLABILITY: 0
FACIALEXPRESSION: 0
TOTALSCORE: 0
FACIALEXPRESSION: 0
NEGVOCALIZATION: 0
TOTALSCORE: 0
BODYLANGUAGE: 0
BREATHING: 0
BODYLANGUAGE: 0
FACIALEXPRESSION: 0
BREATHING: 0
NEGVOCALIZATION: 0
TOTALSCORE: 0
CONSOLABILITY: 0
NEGVOCALIZATION: 0
BREATHING: 0
CONSOLABILITY: 0

## 2019-10-12 ASSESSMENT — PAIN SCALES - GENERAL
PAINLEVEL_OUTOF10: 0

## 2019-10-13 ENCOUNTER — APPOINTMENT (OUTPATIENT)
Dept: CT IMAGING | Age: 84
DRG: 640 | End: 2019-10-13
Payer: MEDICARE

## 2019-10-13 LAB
ALBUMIN SERPL-MCNC: 3.7 G/DL (ref 3.4–5)
ANION GAP SERPL CALCULATED.3IONS-SCNC: 12 MMOL/L (ref 3–16)
ANION GAP SERPL CALCULATED.3IONS-SCNC: 13 MMOL/L (ref 3–16)
ANION GAP SERPL CALCULATED.3IONS-SCNC: 14 MMOL/L (ref 3–16)
ANION GAP SERPL CALCULATED.3IONS-SCNC: 14 MMOL/L (ref 3–16)
BASOPHILS ABSOLUTE: 0.1 K/UL (ref 0–0.2)
BASOPHILS RELATIVE PERCENT: 1.2 %
BUN BLDV-MCNC: 12 MG/DL (ref 7–20)
BUN BLDV-MCNC: 13 MG/DL (ref 7–20)
BUN BLDV-MCNC: 16 MG/DL (ref 7–20)
BUN BLDV-MCNC: 17 MG/DL (ref 7–20)
CALCIUM SERPL-MCNC: 8.6 MG/DL (ref 8.3–10.6)
CALCIUM SERPL-MCNC: 8.6 MG/DL (ref 8.3–10.6)
CALCIUM SERPL-MCNC: 8.7 MG/DL (ref 8.3–10.6)
CALCIUM SERPL-MCNC: 8.8 MG/DL (ref 8.3–10.6)
CHLORIDE BLD-SCNC: 92 MMOL/L (ref 99–110)
CHLORIDE BLD-SCNC: 92 MMOL/L (ref 99–110)
CHLORIDE BLD-SCNC: 97 MMOL/L (ref 99–110)
CHLORIDE BLD-SCNC: 98 MMOL/L (ref 99–110)
CO2: 22 MMOL/L (ref 21–32)
CO2: 22 MMOL/L (ref 21–32)
CO2: 24 MMOL/L (ref 21–32)
CO2: 25 MMOL/L (ref 21–32)
CREAT SERPL-MCNC: 0.5 MG/DL (ref 0.6–1.2)
CREAT SERPL-MCNC: 0.5 MG/DL (ref 0.6–1.2)
CREAT SERPL-MCNC: <0.5 MG/DL (ref 0.6–1.2)
CREAT SERPL-MCNC: <0.5 MG/DL (ref 0.6–1.2)
EKG ATRIAL RATE: 64 BPM
EKG DIAGNOSIS: NORMAL
EKG P AXIS: 50 DEGREES
EKG P-R INTERVAL: 142 MS
EKG Q-T INTERVAL: 410 MS
EKG QRS DURATION: 74 MS
EKG QTC CALCULATION (BAZETT): 422 MS
EKG R AXIS: 1 DEGREES
EKG T AXIS: 20 DEGREES
EKG VENTRICULAR RATE: 64 BPM
EOSINOPHILS ABSOLUTE: 0.2 K/UL (ref 0–0.6)
EOSINOPHILS RELATIVE PERCENT: 2.8 %
GFR AFRICAN AMERICAN: >60
GFR NON-AFRICAN AMERICAN: >60
GLUCOSE BLD-MCNC: 102 MG/DL (ref 70–99)
GLUCOSE BLD-MCNC: 102 MG/DL (ref 70–99)
GLUCOSE BLD-MCNC: 113 MG/DL (ref 70–99)
GLUCOSE BLD-MCNC: 77 MG/DL (ref 70–99)
HCT VFR BLD CALC: 32.9 % (ref 36–48)
HEMOGLOBIN: 11.1 G/DL (ref 12–16)
LYMPHOCYTES ABSOLUTE: 2.7 K/UL (ref 1–5.1)
LYMPHOCYTES RELATIVE PERCENT: 35.7 %
MCH RBC QN AUTO: 28.5 PG (ref 26–34)
MCHC RBC AUTO-ENTMCNC: 33.9 G/DL (ref 31–36)
MCV RBC AUTO: 84 FL (ref 80–100)
MONOCYTES ABSOLUTE: 0.7 K/UL (ref 0–1.3)
MONOCYTES RELATIVE PERCENT: 9.6 %
NEUTROPHILS ABSOLUTE: 3.9 K/UL (ref 1.7–7.7)
NEUTROPHILS RELATIVE PERCENT: 50.7 %
ORGANISM: ABNORMAL
PDW BLD-RTO: 14.5 % (ref 12.4–15.4)
PHOSPHORUS: 3.4 MG/DL (ref 2.5–4.9)
PLATELET # BLD: 210 K/UL (ref 135–450)
PMV BLD AUTO: 7.8 FL (ref 5–10.5)
POTASSIUM SERPL-SCNC: 4.1 MMOL/L (ref 3.5–5.1)
POTASSIUM SERPL-SCNC: 4.3 MMOL/L (ref 3.5–5.1)
POTASSIUM SERPL-SCNC: 4.3 MMOL/L (ref 3.5–5.1)
POTASSIUM SERPL-SCNC: 4.4 MMOL/L (ref 3.5–5.1)
RBC # BLD: 3.91 M/UL (ref 4–5.2)
SODIUM BLD-SCNC: 128 MMOL/L (ref 136–145)
SODIUM BLD-SCNC: 129 MMOL/L (ref 136–145)
SODIUM BLD-SCNC: 134 MMOL/L (ref 136–145)
SODIUM BLD-SCNC: 134 MMOL/L (ref 136–145)
URINE CULTURE, ROUTINE: ABNORMAL
WBC # BLD: 7.6 K/UL (ref 4–11)

## 2019-10-13 PROCEDURE — 6370000000 HC RX 637 (ALT 250 FOR IP): Performed by: INTERNAL MEDICINE

## 2019-10-13 PROCEDURE — 36415 COLL VENOUS BLD VENIPUNCTURE: CPT

## 2019-10-13 PROCEDURE — 94760 N-INVAS EAR/PLS OXIMETRY 1: CPT

## 2019-10-13 PROCEDURE — 80069 RENAL FUNCTION PANEL: CPT

## 2019-10-13 PROCEDURE — 85025 COMPLETE CBC W/AUTO DIFF WBC: CPT

## 2019-10-13 PROCEDURE — 93010 ELECTROCARDIOGRAM REPORT: CPT | Performed by: INTERNAL MEDICINE

## 2019-10-13 PROCEDURE — 2060000000 HC ICU INTERMEDIATE R&B

## 2019-10-13 PROCEDURE — 74176 CT ABD & PELVIS W/O CONTRAST: CPT

## 2019-10-13 PROCEDURE — 2580000003 HC RX 258: Performed by: INTERNAL MEDICINE

## 2019-10-13 PROCEDURE — 6360000002 HC RX W HCPCS: Performed by: INTERNAL MEDICINE

## 2019-10-13 PROCEDURE — 80048 BASIC METABOLIC PNL TOTAL CA: CPT

## 2019-10-13 RX ADMIN — DESMOPRESSIN ACETATE 2 MCG: 4 SOLUTION INTRAVENOUS at 08:22

## 2019-10-13 RX ADMIN — DOCUSATE SODIUM 100 MG: 100 CAPSULE, LIQUID FILLED ORAL at 22:22

## 2019-10-13 RX ADMIN — LEVOTHYROXINE SODIUM 75 MCG: 75 TABLET ORAL at 06:39

## 2019-10-13 RX ADMIN — LATANOPROST 1 DROP: 50 SOLUTION OPHTHALMIC at 22:29

## 2019-10-13 RX ADMIN — ASPIRIN 81 MG: 81 TABLET, COATED ORAL at 08:23

## 2019-10-13 RX ADMIN — Medication 10 ML: at 08:24

## 2019-10-13 RX ADMIN — DESMOPRESSIN ACETATE 2 MCG: 4 SOLUTION INTRAVENOUS at 02:33

## 2019-10-13 RX ADMIN — ENOXAPARIN SODIUM 40 MG: 40 INJECTION SUBCUTANEOUS at 08:23

## 2019-10-13 RX ADMIN — CEFTRIAXONE 1 G: 1 INJECTION, POWDER, FOR SOLUTION INTRAMUSCULAR; INTRAVENOUS at 17:18

## 2019-10-13 RX ADMIN — VITAMIN D, TAB 1000IU (100/BT) 1000 UNITS: 25 TAB at 08:23

## 2019-10-13 RX ADMIN — Medication 10 ML: at 22:22

## 2019-10-13 RX ADMIN — PANTOPRAZOLE SODIUM 40 MG: 40 TABLET, DELAYED RELEASE ORAL at 06:39

## 2019-10-13 RX ADMIN — DOCUSATE SODIUM 100 MG: 100 CAPSULE, LIQUID FILLED ORAL at 08:23

## 2019-10-13 RX ADMIN — ATORVASTATIN CALCIUM 40 MG: 40 TABLET, FILM COATED ORAL at 08:23

## 2019-10-13 ASSESSMENT — PAIN SCALES - PAIN ASSESSMENT IN ADVANCED DEMENTIA (PAINAD)
NEGVOCALIZATION: 0
TOTALSCORE: 0
FACIALEXPRESSION: 0
TOTALSCORE: 0
BREATHING: 0
TOTALSCORE: 0
BREATHING: 0
FACIALEXPRESSION: 0
CONSOLABILITY: 0
CONSOLABILITY: 0
BODYLANGUAGE: 0
CONSOLABILITY: 0
TOTALSCORE: 0
BODYLANGUAGE: 0
BODYLANGUAGE: 0
BREATHING: 0
NEGVOCALIZATION: 0
CONSOLABILITY: 0
NEGVOCALIZATION: 0
CONSOLABILITY: 0
TOTALSCORE: 0
FACIALEXPRESSION: 0
NEGVOCALIZATION: 0
TOTALSCORE: 0
FACIALEXPRESSION: 0
TOTALSCORE: 0
BREATHING: 0
BREATHING: 0
NEGVOCALIZATION: 0
BREATHING: 0
BODYLANGUAGE: 0
BODYLANGUAGE: 0
BREATHING: 0
FACIALEXPRESSION: 0
BODYLANGUAGE: 0
CONSOLABILITY: 0
CONSOLABILITY: 0
BODYLANGUAGE: 0

## 2019-10-13 ASSESSMENT — PAIN SCALES - GENERAL
PAINLEVEL_OUTOF10: 0

## 2019-10-14 LAB
ALBUMIN SERPL-MCNC: 3.5 G/DL (ref 3.4–5)
ANION GAP SERPL CALCULATED.3IONS-SCNC: 13 MMOL/L (ref 3–16)
ANION GAP SERPL CALCULATED.3IONS-SCNC: 13 MMOL/L (ref 3–16)
ANION GAP SERPL CALCULATED.3IONS-SCNC: 15 MMOL/L (ref 3–16)
ANION GAP SERPL CALCULATED.3IONS-SCNC: 15 MMOL/L (ref 3–16)
BUN BLDV-MCNC: 10 MG/DL (ref 7–20)
BUN BLDV-MCNC: 13 MG/DL (ref 7–20)
BUN BLDV-MCNC: 15 MG/DL (ref 7–20)
BUN BLDV-MCNC: 9 MG/DL (ref 7–20)
CALCIUM SERPL-MCNC: 8.1 MG/DL (ref 8.3–10.6)
CALCIUM SERPL-MCNC: 8.3 MG/DL (ref 8.3–10.6)
CALCIUM SERPL-MCNC: 8.5 MG/DL (ref 8.3–10.6)
CALCIUM SERPL-MCNC: 8.8 MG/DL (ref 8.3–10.6)
CHLORIDE BLD-SCNC: 84 MMOL/L (ref 99–110)
CHLORIDE BLD-SCNC: 84 MMOL/L (ref 99–110)
CHLORIDE BLD-SCNC: 88 MMOL/L (ref 99–110)
CHLORIDE BLD-SCNC: 89 MMOL/L (ref 99–110)
CO2: 21 MMOL/L (ref 21–32)
CO2: 22 MMOL/L (ref 21–32)
CO2: 24 MMOL/L (ref 21–32)
CO2: 25 MMOL/L (ref 21–32)
CREAT SERPL-MCNC: <0.5 MG/DL (ref 0.6–1.2)
GFR AFRICAN AMERICAN: >60
GFR NON-AFRICAN AMERICAN: >60
GLUCOSE BLD-MCNC: 105 MG/DL (ref 70–99)
GLUCOSE BLD-MCNC: 109 MG/DL (ref 70–99)
GLUCOSE BLD-MCNC: 113 MG/DL (ref 70–99)
GLUCOSE BLD-MCNC: 95 MG/DL (ref 70–99)
GLUCOSE BLD-MCNC: 98 MG/DL (ref 70–99)
PERFORMED ON: ABNORMAL
PHOSPHORUS: 2.9 MG/DL (ref 2.5–4.9)
POTASSIUM SERPL-SCNC: 3.7 MMOL/L (ref 3.5–5.1)
POTASSIUM SERPL-SCNC: 3.8 MMOL/L (ref 3.5–5.1)
POTASSIUM SERPL-SCNC: 3.8 MMOL/L (ref 3.5–5.1)
POTASSIUM SERPL-SCNC: 4 MMOL/L (ref 3.5–5.1)
SODIUM BLD-SCNC: 122 MMOL/L (ref 136–145)
SODIUM BLD-SCNC: 123 MMOL/L (ref 136–145)
SODIUM BLD-SCNC: 124 MMOL/L (ref 136–145)
SODIUM BLD-SCNC: 124 MMOL/L (ref 136–145)

## 2019-10-14 PROCEDURE — 80048 BASIC METABOLIC PNL TOTAL CA: CPT

## 2019-10-14 PROCEDURE — 94760 N-INVAS EAR/PLS OXIMETRY 1: CPT

## 2019-10-14 PROCEDURE — 6370000000 HC RX 637 (ALT 250 FOR IP): Performed by: INTERNAL MEDICINE

## 2019-10-14 PROCEDURE — 2580000003 HC RX 258: Performed by: INTERNAL MEDICINE

## 2019-10-14 PROCEDURE — 80069 RENAL FUNCTION PANEL: CPT

## 2019-10-14 PROCEDURE — 6360000002 HC RX W HCPCS: Performed by: INTERNAL MEDICINE

## 2019-10-14 PROCEDURE — 36415 COLL VENOUS BLD VENIPUNCTURE: CPT

## 2019-10-14 PROCEDURE — 2060000000 HC ICU INTERMEDIATE R&B

## 2019-10-14 RX ORDER — FUROSEMIDE 20 MG/1
20 TABLET ORAL ONCE
Status: COMPLETED | OUTPATIENT
Start: 2019-10-14 | End: 2019-10-14

## 2019-10-14 RX ADMIN — ASPIRIN 81 MG: 81 TABLET, COATED ORAL at 08:38

## 2019-10-14 RX ADMIN — VITAMIN D, TAB 1000IU (100/BT) 1000 UNITS: 25 TAB at 08:38

## 2019-10-14 RX ADMIN — FUROSEMIDE 20 MG: 20 TABLET ORAL at 09:09

## 2019-10-14 RX ADMIN — PANTOPRAZOLE SODIUM 40 MG: 40 TABLET, DELAYED RELEASE ORAL at 08:38

## 2019-10-14 RX ADMIN — ENOXAPARIN SODIUM 40 MG: 40 INJECTION SUBCUTANEOUS at 08:38

## 2019-10-14 RX ADMIN — ONDANSETRON 4 MG: 2 INJECTION INTRAMUSCULAR; INTRAVENOUS at 11:14

## 2019-10-14 RX ADMIN — DOCUSATE SODIUM 100 MG: 100 CAPSULE, LIQUID FILLED ORAL at 08:38

## 2019-10-14 RX ADMIN — Medication 10 ML: at 08:39

## 2019-10-14 RX ADMIN — Medication 10 ML: at 20:32

## 2019-10-14 RX ADMIN — CEFTRIAXONE 1 G: 1 INJECTION, POWDER, FOR SOLUTION INTRAMUSCULAR; INTRAVENOUS at 16:52

## 2019-10-14 RX ADMIN — LATANOPROST 1 DROP: 50 SOLUTION OPHTHALMIC at 20:32

## 2019-10-14 RX ADMIN — DOCUSATE SODIUM 100 MG: 100 CAPSULE, LIQUID FILLED ORAL at 20:34

## 2019-10-14 RX ADMIN — ATORVASTATIN CALCIUM 40 MG: 40 TABLET, FILM COATED ORAL at 08:38

## 2019-10-14 RX ADMIN — SODIUM CHLORIDE: 234 INJECTION INTRAMUSCULAR; INTRAVENOUS; SUBCUTANEOUS at 20:32

## 2019-10-14 RX ADMIN — LEVOTHYROXINE SODIUM 75 MCG: 75 TABLET ORAL at 08:38

## 2019-10-14 ASSESSMENT — PAIN SCALES - PAIN ASSESSMENT IN ADVANCED DEMENTIA (PAINAD)
BREATHING: 0
CONSOLABILITY: 0
BODYLANGUAGE: 0
FACIALEXPRESSION: 0
TOTALSCORE: 0
BREATHING: 0
CONSOLABILITY: 0
NEGVOCALIZATION: 0
BODYLANGUAGE: 0
BODYLANGUAGE: 0
FACIALEXPRESSION: 0
BREATHING: 0
NEGVOCALIZATION: 0
CONSOLABILITY: 0
FACIALEXPRESSION: 0
NEGVOCALIZATION: 0
TOTALSCORE: 0
TOTALSCORE: 0

## 2019-10-14 ASSESSMENT — PAIN SCALES - GENERAL
PAINLEVEL_OUTOF10: 0

## 2019-10-15 LAB
ANION GAP SERPL CALCULATED.3IONS-SCNC: 14 MMOL/L (ref 3–16)
ANION GAP SERPL CALCULATED.3IONS-SCNC: 14 MMOL/L (ref 3–16)
ANION GAP SERPL CALCULATED.3IONS-SCNC: 15 MMOL/L (ref 3–16)
ANION GAP SERPL CALCULATED.3IONS-SCNC: 16 MMOL/L (ref 3–16)
BUN BLDV-MCNC: 11 MG/DL (ref 7–20)
BUN BLDV-MCNC: 7 MG/DL (ref 7–20)
BUN BLDV-MCNC: 8 MG/DL (ref 7–20)
BUN BLDV-MCNC: 8 MG/DL (ref 7–20)
CALCIUM SERPL-MCNC: 8.7 MG/DL (ref 8.3–10.6)
CALCIUM SERPL-MCNC: 9.1 MG/DL (ref 8.3–10.6)
CALCIUM SERPL-MCNC: 9.4 MG/DL (ref 8.3–10.6)
CALCIUM SERPL-MCNC: 9.4 MG/DL (ref 8.3–10.6)
CHLORIDE BLD-SCNC: 92 MMOL/L (ref 99–110)
CHLORIDE BLD-SCNC: 95 MMOL/L (ref 99–110)
CHLORIDE BLD-SCNC: 97 MMOL/L (ref 99–110)
CHLORIDE BLD-SCNC: 97 MMOL/L (ref 99–110)
CO2: 22 MMOL/L (ref 21–32)
CO2: 26 MMOL/L (ref 21–32)
CREAT SERPL-MCNC: 0.6 MG/DL (ref 0.6–1.2)
CREAT SERPL-MCNC: <0.5 MG/DL (ref 0.6–1.2)
GFR AFRICAN AMERICAN: >60
GFR NON-AFRICAN AMERICAN: >60
GLUCOSE BLD-MCNC: 116 MG/DL (ref 70–99)
GLUCOSE BLD-MCNC: 89 MG/DL (ref 70–99)
GLUCOSE BLD-MCNC: 91 MG/DL (ref 70–99)
GLUCOSE BLD-MCNC: 97 MG/DL (ref 70–99)
LV EF: 68 %
LVEF MODALITY: NORMAL
POTASSIUM SERPL-SCNC: 3.6 MMOL/L (ref 3.5–5.1)
POTASSIUM SERPL-SCNC: 3.8 MMOL/L (ref 3.5–5.1)
POTASSIUM SERPL-SCNC: 3.9 MMOL/L (ref 3.5–5.1)
POTASSIUM SERPL-SCNC: 4.1 MMOL/L (ref 3.5–5.1)
SODIUM BLD-SCNC: 128 MMOL/L (ref 136–145)
SODIUM BLD-SCNC: 137 MMOL/L (ref 136–145)
SODIUM BLD-SCNC: 137 MMOL/L (ref 136–145)
SODIUM BLD-SCNC: 138 MMOL/L (ref 136–145)

## 2019-10-15 PROCEDURE — 6360000002 HC RX W HCPCS: Performed by: INTERNAL MEDICINE

## 2019-10-15 PROCEDURE — 2580000003 HC RX 258

## 2019-10-15 PROCEDURE — 97116 GAIT TRAINING THERAPY: CPT | Performed by: PHYSICAL THERAPIST

## 2019-10-15 PROCEDURE — 97530 THERAPEUTIC ACTIVITIES: CPT | Performed by: PHYSICAL THERAPIST

## 2019-10-15 PROCEDURE — 2580000003 HC RX 258: Performed by: INTERNAL MEDICINE

## 2019-10-15 PROCEDURE — 6370000000 HC RX 637 (ALT 250 FOR IP): Performed by: INTERNAL MEDICINE

## 2019-10-15 PROCEDURE — 93306 TTE W/DOPPLER COMPLETE: CPT

## 2019-10-15 PROCEDURE — 80048 BASIC METABOLIC PNL TOTAL CA: CPT

## 2019-10-15 PROCEDURE — 97166 OT EVAL MOD COMPLEX 45 MIN: CPT

## 2019-10-15 PROCEDURE — 97162 PT EVAL MOD COMPLEX 30 MIN: CPT | Performed by: PHYSICAL THERAPIST

## 2019-10-15 PROCEDURE — 97530 THERAPEUTIC ACTIVITIES: CPT

## 2019-10-15 PROCEDURE — 36415 COLL VENOUS BLD VENIPUNCTURE: CPT

## 2019-10-15 PROCEDURE — 2060000000 HC ICU INTERMEDIATE R&B

## 2019-10-15 PROCEDURE — 94760 N-INVAS EAR/PLS OXIMETRY 1: CPT

## 2019-10-15 RX ORDER — DEXTROSE MONOHYDRATE 50 MG/ML
INJECTION, SOLUTION INTRAVENOUS
Status: COMPLETED
Start: 2019-10-15 | End: 2019-10-15

## 2019-10-15 RX ADMIN — ASPIRIN 81 MG: 81 TABLET, COATED ORAL at 08:27

## 2019-10-15 RX ADMIN — DOCUSATE SODIUM 100 MG: 100 CAPSULE, LIQUID FILLED ORAL at 20:41

## 2019-10-15 RX ADMIN — DEXTROSE MONOHYDRATE 150 ML: 50 INJECTION, SOLUTION INTRAVENOUS at 10:05

## 2019-10-15 RX ADMIN — VITAMIN D, TAB 1000IU (100/BT) 1000 UNITS: 25 TAB at 08:27

## 2019-10-15 RX ADMIN — LATANOPROST 1 DROP: 50 SOLUTION OPHTHALMIC at 20:41

## 2019-10-15 RX ADMIN — LEVOTHYROXINE SODIUM 75 MCG: 75 TABLET ORAL at 06:41

## 2019-10-15 RX ADMIN — ENOXAPARIN SODIUM 40 MG: 40 INJECTION SUBCUTANEOUS at 08:26

## 2019-10-15 RX ADMIN — CEFTRIAXONE 1 G: 1 INJECTION, POWDER, FOR SOLUTION INTRAMUSCULAR; INTRAVENOUS at 16:28

## 2019-10-15 RX ADMIN — DEXTROSE MONOHYDRATE 150 ML: 50 INJECTION, SOLUTION INTRAVENOUS at 18:59

## 2019-10-15 RX ADMIN — DOCUSATE SODIUM 100 MG: 100 CAPSULE, LIQUID FILLED ORAL at 08:27

## 2019-10-15 RX ADMIN — PANTOPRAZOLE SODIUM 40 MG: 40 TABLET, DELAYED RELEASE ORAL at 06:41

## 2019-10-15 RX ADMIN — Medication 10 ML: at 08:27

## 2019-10-15 RX ADMIN — Medication 10 ML: at 20:41

## 2019-10-15 RX ADMIN — ATORVASTATIN CALCIUM 40 MG: 40 TABLET, FILM COATED ORAL at 08:27

## 2019-10-15 ASSESSMENT — PAIN SCALES - GENERAL: PAINLEVEL_OUTOF10: 0

## 2019-10-16 LAB
ANION GAP SERPL CALCULATED.3IONS-SCNC: 14 MMOL/L (ref 3–16)
ANION GAP SERPL CALCULATED.3IONS-SCNC: 14 MMOL/L (ref 3–16)
ANION GAP SERPL CALCULATED.3IONS-SCNC: 15 MMOL/L (ref 3–16)
BLOOD CULTURE, ROUTINE: NORMAL
BUN BLDV-MCNC: 12 MG/DL (ref 7–20)
BUN BLDV-MCNC: 14 MG/DL (ref 7–20)
BUN BLDV-MCNC: 15 MG/DL (ref 7–20)
CALCIUM SERPL-MCNC: 8.9 MG/DL (ref 8.3–10.6)
CALCIUM SERPL-MCNC: 9 MG/DL (ref 8.3–10.6)
CALCIUM SERPL-MCNC: 9.2 MG/DL (ref 8.3–10.6)
CHLORIDE BLD-SCNC: 94 MMOL/L (ref 99–110)
CHLORIDE BLD-SCNC: 96 MMOL/L (ref 99–110)
CHLORIDE BLD-SCNC: 99 MMOL/L (ref 99–110)
CO2: 25 MMOL/L (ref 21–32)
CO2: 25 MMOL/L (ref 21–32)
CO2: 26 MMOL/L (ref 21–32)
CREAT SERPL-MCNC: 0.6 MG/DL (ref 0.6–1.2)
CREAT SERPL-MCNC: 0.6 MG/DL (ref 0.6–1.2)
CREAT SERPL-MCNC: <0.5 MG/DL (ref 0.6–1.2)
CULTURE, BLOOD 2: NORMAL
GFR AFRICAN AMERICAN: >60
GFR NON-AFRICAN AMERICAN: >60
GLUCOSE BLD-MCNC: 116 MG/DL (ref 70–99)
GLUCOSE BLD-MCNC: 129 MG/DL (ref 70–99)
GLUCOSE BLD-MCNC: 97 MG/DL (ref 70–99)
HCT VFR BLD CALC: 34.4 % (ref 36–48)
HEMOGLOBIN: 11.6 G/DL (ref 12–16)
MAGNESIUM: 1.9 MG/DL (ref 1.8–2.4)
MCH RBC QN AUTO: 28.3 PG (ref 26–34)
MCHC RBC AUTO-ENTMCNC: 33.8 G/DL (ref 31–36)
MCV RBC AUTO: 83.9 FL (ref 80–100)
PDW BLD-RTO: 14.1 % (ref 12.4–15.4)
PLATELET # BLD: 235 K/UL (ref 135–450)
PMV BLD AUTO: 7.5 FL (ref 5–10.5)
POTASSIUM SERPL-SCNC: 3.6 MMOL/L (ref 3.5–5.1)
POTASSIUM SERPL-SCNC: 3.8 MMOL/L (ref 3.5–5.1)
POTASSIUM SERPL-SCNC: 3.8 MMOL/L (ref 3.5–5.1)
RBC # BLD: 4.1 M/UL (ref 4–5.2)
SODIUM BLD-SCNC: 135 MMOL/L (ref 136–145)
SODIUM BLD-SCNC: 135 MMOL/L (ref 136–145)
SODIUM BLD-SCNC: 138 MMOL/L (ref 136–145)
WBC # BLD: 6.4 K/UL (ref 4–11)

## 2019-10-16 PROCEDURE — 6360000002 HC RX W HCPCS: Performed by: INTERNAL MEDICINE

## 2019-10-16 PROCEDURE — 97530 THERAPEUTIC ACTIVITIES: CPT

## 2019-10-16 PROCEDURE — 2580000003 HC RX 258: Performed by: INTERNAL MEDICINE

## 2019-10-16 PROCEDURE — 97116 GAIT TRAINING THERAPY: CPT

## 2019-10-16 PROCEDURE — 85027 COMPLETE CBC AUTOMATED: CPT

## 2019-10-16 PROCEDURE — 94760 N-INVAS EAR/PLS OXIMETRY 1: CPT

## 2019-10-16 PROCEDURE — 80048 BASIC METABOLIC PNL TOTAL CA: CPT

## 2019-10-16 PROCEDURE — 36415 COLL VENOUS BLD VENIPUNCTURE: CPT

## 2019-10-16 PROCEDURE — 97535 SELF CARE MNGMENT TRAINING: CPT

## 2019-10-16 PROCEDURE — 83735 ASSAY OF MAGNESIUM: CPT

## 2019-10-16 PROCEDURE — 2060000000 HC ICU INTERMEDIATE R&B

## 2019-10-16 PROCEDURE — 6370000000 HC RX 637 (ALT 250 FOR IP): Performed by: INTERNAL MEDICINE

## 2019-10-16 RX ADMIN — LEVOTHYROXINE SODIUM 75 MCG: 75 TABLET ORAL at 06:08

## 2019-10-16 RX ADMIN — DOCUSATE SODIUM 100 MG: 100 CAPSULE, LIQUID FILLED ORAL at 21:29

## 2019-10-16 RX ADMIN — VITAMIN D, TAB 1000IU (100/BT) 1000 UNITS: 25 TAB at 09:35

## 2019-10-16 RX ADMIN — Medication 10 ML: at 09:35

## 2019-10-16 RX ADMIN — ENOXAPARIN SODIUM 40 MG: 40 INJECTION SUBCUTANEOUS at 09:35

## 2019-10-16 RX ADMIN — PANTOPRAZOLE SODIUM 40 MG: 40 TABLET, DELAYED RELEASE ORAL at 06:08

## 2019-10-16 RX ADMIN — LATANOPROST 1 DROP: 50 SOLUTION OPHTHALMIC at 21:28

## 2019-10-16 RX ADMIN — ASPIRIN 81 MG: 81 TABLET, COATED ORAL at 09:35

## 2019-10-16 RX ADMIN — Medication 10 ML: at 21:29

## 2019-10-16 RX ADMIN — ATORVASTATIN CALCIUM 40 MG: 40 TABLET, FILM COATED ORAL at 09:35

## 2019-10-16 RX ADMIN — DOCUSATE SODIUM 100 MG: 100 CAPSULE, LIQUID FILLED ORAL at 09:35

## 2019-10-16 ASSESSMENT — PAIN SCALES - GENERAL
PAINLEVEL_OUTOF10: 0
PAINLEVEL_OUTOF10: 0

## 2019-10-17 VITALS
HEART RATE: 70 BPM | OXYGEN SATURATION: 93 % | TEMPERATURE: 98.3 F | DIASTOLIC BLOOD PRESSURE: 70 MMHG | BODY MASS INDEX: 27.51 KG/M2 | HEIGHT: 62 IN | WEIGHT: 149.47 LBS | RESPIRATION RATE: 16 BRPM | SYSTOLIC BLOOD PRESSURE: 124 MMHG

## 2019-10-17 LAB
ANION GAP SERPL CALCULATED.3IONS-SCNC: 11 MMOL/L (ref 3–16)
BUN BLDV-MCNC: 12 MG/DL (ref 7–20)
CALCIUM SERPL-MCNC: 9.2 MG/DL (ref 8.3–10.6)
CHLORIDE BLD-SCNC: 100 MMOL/L (ref 99–110)
CO2: 28 MMOL/L (ref 21–32)
CREAT SERPL-MCNC: 0.5 MG/DL (ref 0.6–1.2)
GFR AFRICAN AMERICAN: >60
GFR NON-AFRICAN AMERICAN: >60
GLUCOSE BLD-MCNC: 91 MG/DL (ref 70–99)
HCT VFR BLD CALC: 30 % (ref 36–48)
HEMOGLOBIN: 10.3 G/DL (ref 12–16)
MAGNESIUM: 2 MG/DL (ref 1.8–2.4)
MCH RBC QN AUTO: 28.7 PG (ref 26–34)
MCHC RBC AUTO-ENTMCNC: 34.4 G/DL (ref 31–36)
MCV RBC AUTO: 83.6 FL (ref 80–100)
PDW BLD-RTO: 14.3 % (ref 12.4–15.4)
PLATELET # BLD: 209 K/UL (ref 135–450)
PMV BLD AUTO: 7.5 FL (ref 5–10.5)
POTASSIUM SERPL-SCNC: 3.9 MMOL/L (ref 3.5–5.1)
RBC # BLD: 3.59 M/UL (ref 4–5.2)
SODIUM BLD-SCNC: 139 MMOL/L (ref 136–145)
WBC # BLD: 5.8 K/UL (ref 4–11)

## 2019-10-17 PROCEDURE — 6360000002 HC RX W HCPCS: Performed by: INTERNAL MEDICINE

## 2019-10-17 PROCEDURE — 83735 ASSAY OF MAGNESIUM: CPT

## 2019-10-17 PROCEDURE — 2580000003 HC RX 258: Performed by: INTERNAL MEDICINE

## 2019-10-17 PROCEDURE — 85027 COMPLETE CBC AUTOMATED: CPT

## 2019-10-17 PROCEDURE — 94760 N-INVAS EAR/PLS OXIMETRY 1: CPT

## 2019-10-17 PROCEDURE — 36415 COLL VENOUS BLD VENIPUNCTURE: CPT

## 2019-10-17 PROCEDURE — 80048 BASIC METABOLIC PNL TOTAL CA: CPT

## 2019-10-17 PROCEDURE — 6370000000 HC RX 637 (ALT 250 FOR IP): Performed by: INTERNAL MEDICINE

## 2019-10-17 RX ORDER — ASPIRIN 81 MG/1
81 TABLET ORAL DAILY
Qty: 30 TABLET | Refills: 3 | Status: SHIPPED | OUTPATIENT
Start: 2019-10-18

## 2019-10-17 RX ADMIN — LEVOTHYROXINE SODIUM 75 MCG: 75 TABLET ORAL at 06:26

## 2019-10-17 RX ADMIN — Medication 10 ML: at 08:35

## 2019-10-17 RX ADMIN — DOCUSATE SODIUM 100 MG: 100 CAPSULE, LIQUID FILLED ORAL at 08:34

## 2019-10-17 RX ADMIN — ATORVASTATIN CALCIUM 40 MG: 40 TABLET, FILM COATED ORAL at 08:34

## 2019-10-17 RX ADMIN — PANTOPRAZOLE SODIUM 40 MG: 40 TABLET, DELAYED RELEASE ORAL at 06:26

## 2019-10-17 RX ADMIN — ASPIRIN 81 MG: 81 TABLET, COATED ORAL at 08:34

## 2019-10-17 RX ADMIN — ENOXAPARIN SODIUM 40 MG: 40 INJECTION SUBCUTANEOUS at 08:34

## 2019-10-17 RX ADMIN — VITAMIN D, TAB 1000IU (100/BT) 1000 UNITS: 25 TAB at 08:34

## 2019-10-17 ASSESSMENT — PAIN SCALES - GENERAL: PAINLEVEL_OUTOF10: 0

## 2019-11-06 ENCOUNTER — OFFICE VISIT (OUTPATIENT)
Dept: ORTHOPEDIC SURGERY | Age: 84
End: 2019-11-06
Payer: MEDICARE

## 2019-11-06 VITALS — BODY MASS INDEX: 25.44 KG/M2 | WEIGHT: 149 LBS | HEIGHT: 64 IN

## 2019-11-06 DIAGNOSIS — S72.002A LEFT DISPLACED FEMORAL NECK FRACTURE (HCC): Primary | ICD-10-CM

## 2019-11-06 PROCEDURE — G8482 FLU IMMUNIZE ORDER/ADMIN: HCPCS | Performed by: ORTHOPAEDIC SURGERY

## 2019-11-06 PROCEDURE — 1123F ACP DISCUSS/DSCN MKR DOCD: CPT | Performed by: ORTHOPAEDIC SURGERY

## 2019-11-06 PROCEDURE — 4040F PNEUMOC VAC/ADMIN/RCVD: CPT | Performed by: ORTHOPAEDIC SURGERY

## 2019-11-06 PROCEDURE — 1090F PRES/ABSN URINE INCON ASSESS: CPT | Performed by: ORTHOPAEDIC SURGERY

## 2019-11-06 PROCEDURE — G8427 DOCREV CUR MEDS BY ELIG CLIN: HCPCS | Performed by: ORTHOPAEDIC SURGERY

## 2019-11-06 PROCEDURE — G8400 PT W/DXA NO RESULTS DOC: HCPCS | Performed by: ORTHOPAEDIC SURGERY

## 2019-11-06 PROCEDURE — 1036F TOBACCO NON-USER: CPT | Performed by: ORTHOPAEDIC SURGERY

## 2019-11-06 PROCEDURE — G8417 CALC BMI ABV UP PARAM F/U: HCPCS | Performed by: ORTHOPAEDIC SURGERY

## 2019-11-06 PROCEDURE — 99213 OFFICE O/P EST LOW 20 MIN: CPT | Performed by: ORTHOPAEDIC SURGERY

## 2019-11-06 PROCEDURE — 1111F DSCHRG MED/CURRENT MED MERGE: CPT | Performed by: ORTHOPAEDIC SURGERY

## 2020-06-10 ENCOUNTER — OFFICE VISIT (OUTPATIENT)
Dept: ORTHOPEDIC SURGERY | Age: 85
End: 2020-06-10
Payer: MEDICARE

## 2020-06-10 VITALS — TEMPERATURE: 97.3 F | BODY MASS INDEX: 25.58 KG/M2 | HEIGHT: 64 IN

## 2020-06-10 PROCEDURE — G8427 DOCREV CUR MEDS BY ELIG CLIN: HCPCS | Performed by: ORTHOPAEDIC SURGERY

## 2020-06-10 PROCEDURE — 1123F ACP DISCUSS/DSCN MKR DOCD: CPT | Performed by: ORTHOPAEDIC SURGERY

## 2020-06-10 PROCEDURE — G8400 PT W/DXA NO RESULTS DOC: HCPCS | Performed by: ORTHOPAEDIC SURGERY

## 2020-06-10 PROCEDURE — 4040F PNEUMOC VAC/ADMIN/RCVD: CPT | Performed by: ORTHOPAEDIC SURGERY

## 2020-06-10 PROCEDURE — 1090F PRES/ABSN URINE INCON ASSESS: CPT | Performed by: ORTHOPAEDIC SURGERY

## 2020-06-10 PROCEDURE — 1036F TOBACCO NON-USER: CPT | Performed by: ORTHOPAEDIC SURGERY

## 2020-06-10 PROCEDURE — 99213 OFFICE O/P EST LOW 20 MIN: CPT | Performed by: ORTHOPAEDIC SURGERY

## 2020-06-10 PROCEDURE — G8417 CALC BMI ABV UP PARAM F/U: HCPCS | Performed by: ORTHOPAEDIC SURGERY

## 2020-08-03 PROBLEM — M47.812 FACET ARTHROPATHY, CERVICAL: Status: ACTIVE | Noted: 2017-02-23

## 2020-08-03 PROBLEM — R35.1 NOCTURIA: Status: ACTIVE | Noted: 2019-12-03

## 2020-08-03 PROBLEM — M25.519 SHOULDER PAIN: Status: ACTIVE | Noted: 2017-01-09

## 2020-08-03 PROBLEM — H61.001: Status: ACTIVE | Noted: 2018-11-02

## 2020-08-03 PROBLEM — M54.2 CHRONIC NECK PAIN: Status: ACTIVE | Noted: 2017-02-21

## 2020-08-03 PROBLEM — Z78.9 ADVANCE DIRECTIVE PLACED IN CHART THIS ADMISSION: Status: ACTIVE | Noted: 2017-01-25

## 2020-08-03 PROBLEM — N39.45 CONTINUOUS LEAKAGE OF URINE: Status: ACTIVE | Noted: 2017-05-02

## 2020-08-03 PROBLEM — N95.2 VAGINAL ATROPHY: Status: ACTIVE | Noted: 2019-12-03

## 2020-08-03 PROBLEM — N32.81 OVERACTIVE BLADDER: Status: ACTIVE | Noted: 2019-12-03

## 2020-08-03 PROBLEM — K59.04 CHRONIC IDIOPATHIC CONSTIPATION: Status: ACTIVE | Noted: 2019-10-23

## 2020-08-03 PROBLEM — M75.01 ADHESIVE CAPSULITIS OF BOTH SHOULDERS: Status: ACTIVE | Noted: 2019-03-06

## 2020-08-03 PROBLEM — N39.0 RECURRENT UTI (URINARY TRACT INFECTION): Status: ACTIVE | Noted: 2018-10-03

## 2020-08-03 PROBLEM — M19.011 OSTEOARTHRITIS OF RIGHT AC (ACROMIOCLAVICULAR) JOINT: Status: ACTIVE | Noted: 2017-01-09

## 2020-08-03 PROBLEM — S72.042A CLOSED DISPLACED FRACTURE OF BASE OF NECK OF LEFT FEMUR (HCC): Status: ACTIVE | Noted: 2019-04-16

## 2020-08-03 PROBLEM — M75.02 ADHESIVE CAPSULITIS OF BOTH SHOULDERS: Status: ACTIVE | Noted: 2019-03-06

## 2020-08-03 PROBLEM — R74.8 ALKALINE PHOSPHATASE ELEVATION: Status: ACTIVE | Noted: 2018-07-23

## 2020-08-03 PROBLEM — D69.2 SENILE PURPURA (HCC): Status: ACTIVE | Noted: 2019-07-24

## 2020-08-03 PROBLEM — G89.29 CHRONIC NECK PAIN: Status: ACTIVE | Noted: 2017-02-21

## 2020-08-03 PROBLEM — Z91.81 AT MODERATE RISK FOR FALL: Status: ACTIVE | Noted: 2019-07-24

## 2020-08-03 PROBLEM — Z91.89 DRIVING SAFETY ISSUE: Status: ACTIVE | Noted: 2017-11-07

## 2020-08-04 ENCOUNTER — OFFICE VISIT (OUTPATIENT)
Dept: UROGYNECOLOGY | Age: 85
End: 2020-08-04
Payer: MEDICARE

## 2020-08-04 VITALS
SYSTOLIC BLOOD PRESSURE: 156 MMHG | OXYGEN SATURATION: 96 % | DIASTOLIC BLOOD PRESSURE: 66 MMHG | TEMPERATURE: 97 F | RESPIRATION RATE: 16 BRPM | HEART RATE: 67 BPM

## 2020-08-04 PROCEDURE — G8417 CALC BMI ABV UP PARAM F/U: HCPCS | Performed by: OBSTETRICS & GYNECOLOGY

## 2020-08-04 PROCEDURE — 99214 OFFICE O/P EST MOD 30 MIN: CPT | Performed by: OBSTETRICS & GYNECOLOGY

## 2020-08-04 PROCEDURE — G8400 PT W/DXA NO RESULTS DOC: HCPCS | Performed by: OBSTETRICS & GYNECOLOGY

## 2020-08-04 PROCEDURE — G8427 DOCREV CUR MEDS BY ELIG CLIN: HCPCS | Performed by: OBSTETRICS & GYNECOLOGY

## 2020-08-04 PROCEDURE — 4040F PNEUMOC VAC/ADMIN/RCVD: CPT | Performed by: OBSTETRICS & GYNECOLOGY

## 2020-08-04 PROCEDURE — 1090F PRES/ABSN URINE INCON ASSESS: CPT | Performed by: OBSTETRICS & GYNECOLOGY

## 2020-08-04 PROCEDURE — 1123F ACP DISCUSS/DSCN MKR DOCD: CPT | Performed by: OBSTETRICS & GYNECOLOGY

## 2020-08-04 PROCEDURE — 0509F URINE INCON PLAN DOCD: CPT | Performed by: OBSTETRICS & GYNECOLOGY

## 2020-08-04 PROCEDURE — 1036F TOBACCO NON-USER: CPT | Performed by: OBSTETRICS & GYNECOLOGY

## 2020-08-04 RX ORDER — TRIMETHOPRIM 100 MG/1
100 TABLET ORAL DAILY
COMMUNITY
End: 2021-03-03 | Stop reason: SDUPTHER

## 2020-08-04 RX ORDER — NYSTATIN 100000 [USP'U]/G
POWDER TOPICAL
Qty: 3 BOTTLE | Refills: 1 | Status: SHIPPED | OUTPATIENT
Start: 2020-08-04 | End: 2021-03-15 | Stop reason: SDUPTHER

## 2020-08-04 RX ORDER — TROSPIUM CHLORIDE 20 MG/1
20 TABLET, FILM COATED ORAL NIGHTLY
Qty: 90 TABLET | Refills: 1 | Status: SHIPPED | OUTPATIENT
Start: 2020-08-04 | End: 2020-12-28 | Stop reason: SDUPTHER

## 2020-08-04 RX ORDER — TRIMETHOPRIM 100 MG/1
100 TABLET ORAL DAILY
Qty: 90 TABLET | Refills: 1 | Status: CANCELLED | OUTPATIENT
Start: 2020-08-04

## 2020-08-04 RX ORDER — TROSPIUM CHLORIDE 20 MG/1
20 TABLET, FILM COATED ORAL NIGHTLY
COMMUNITY
End: 2020-08-04 | Stop reason: SDUPTHER

## 2020-08-04 ASSESSMENT — ENCOUNTER SYMPTOMS: DIARRHEA: 1

## 2020-08-04 NOTE — PROGRESS NOTES
PINNING Left 4/16/2019    LEFT HIP PINNING performed by Wes Flores MD at [address] FLX DX W/COLLZENAIDA Huong 1978 PFRMD N/A 12/4/2018    COLONOSCOPY DIAGNOSTIC OR SCREENING performed by Nataliya Nuno MD at [address] [facility] ENDOSCOPY  10/18/2016    Dr Cheryl Casillas     Allergies: Allergies   Allergen Reactions    Desmopressin Acetate Other (See Comments)     Eye irritation  Confusion and sodium low    Amoxicillin      diarrhea    Penicillins Rash     Current Medications:  Current Outpatient Medications   Medication Sig Dispense Refill    trimethoprim (TRIMPEX) 100 MG tablet Take 100 mg by mouth daily      nystatin (MYCOSTATIN) 831931 UNIT/GM powder Apply 3 times daily. 3 Bottle 1    trospium (SANCTURA) 20 MG tablet Take 1 tablet by mouth nightly 90 tablet 1    Cranberry-Vitamin C-Vitamin E 4200-20-3 MG-MG-UNIT CAPS Take 1 capsule by mouth daily      vitamin D (CHOLECALCIFEROL) 1000 UNIT TABS tablet Take 1,000 Units by mouth daily      conjugated estrogens (PREMARIN) 0.625 MG/GM vaginal cream Place 0.5 g vaginally Twice a Week       triamcinolone (KENALOG) 0.1 % ointment Apply topically 2 times daily as needed       atorvastatin (LIPITOR) 40 MG tablet Take 40 mg by mouth daily       levothyroxine (SYNTHROID) 75 MCG tablet Take 75 mcg by mouth Daily       bimatoprost (LUMIGAN) 0.01 % SOLN ophthalmic drops Place 1 drop into both eyes nightly       brinzolamide-brimonidine 1-0.2 % SUSP Place 1 drop into both eyes 2 times daily       omeprazole (PRILOSEC) 20 MG delayed release capsule Take 20 mg by mouth daily       aspirin 81 MG EC tablet Take 1 tablet by mouth daily 30 tablet 3    clotrimazole-betamethasone (LOTRISONE) 1-0.05 % cream Apply topically 2 times daily Apply topically 2 times daily.       vitamin D (ERGOCALCIFEROL) 02264 units CAPS capsule Take 50,000 Units by mouth every 28 days      naproxen sodium (ALEVE) 220 MG tablet Take 220 mg by mouth 2 times daily as needed for Pain      docusate sodium (COLACE, DULCOLAX) 100 MG CAPS Take 100 mg by mouth 2 times daily       No current facility-administered medications for this visit. Social History:   Social History     Socioeconomic History    Marital status:      Spouse name: Not on file    Number of children: Not on file    Years of education: Not on file    Highest education level: Not on file   Occupational History    Occupation: retired   Social Needs    Financial resource strain: Not on file    Food insecurity     Worry: Not on file     Inability: Not on file   [employer] needs     Medical: Not on file     Non-medical: Not on file   Tobacco Use    Smoking status: Never Smoker    Smokeless tobacco: Never Used   Substance and Sexual Activity    Alcohol use: No    Drug use: No    Sexual activity: Not on file   Lifestyle    Physical activity     Days per week: Not on file     Minutes per session: Not on file    Stress: Not on file   Relationships    Social connections     Talks on phone: Not on file     Gets together: Not on file     Attends Faith service: Not on file     Active member of club or organization: Not on file     Attends meetings of clubs or organizations: Not on file     Relationship status: Not on file    Intimate partner violence     Fear of current or ex partner: Not on file     Emotionally abused: Not on file     Physically abused: Not on file     Forced sexual activity: Not on file   Other Topics Concern    Not on file   Social History Narrative    Not on file     Family History:   Family History   Problem Relation Age of Onset    Diabetes Mother     Stroke Mother     Heart Disease Mother     COPD Brother      Review of System   Review of Systems   Gastrointestinal: Positive for diarrhea. Musculoskeletal: Positive for neck pain.        Objective:     Vitals  Vitals:    08/04/20 1317   BP: (!) 156/66   Pulse: 67   Resp: 16   Temp: 97 °F (36.1 °C)   SpO2: 96%     Physical Exam  Physical Exam  Exam conducted with a chaperone present. HENT:      Head: Normocephalic and atraumatic. Pulmonary:      Effort: Pulmonary effort is normal.   Abdominal:      General: Abdomen is flat. Palpations: Abdomen is soft. Comments: Yeast under her breast and pannus   Genitourinary:     Exam position: Lithotomy position. Skin:     General: Skin is warm and dry. Neurological:      Mental Status: She is alert and oriented to person, place, and time. Psychiatric:         Mood and Affect: Mood normal.         No results found for this visit on 08/04/20. Assessment/Plan:     Dorothy Wilder is a 80 y.o. female with   1. Vaginal atrophy    2. Urinary urgency    3. Urinary frequency    4. Urge incontinence    5. Yeast dermatitis      She still doing relatively well while on trospium and trimethoprim. She also goes to the bathroom every 2 hours which seems to be a good balance with everything. She does have some yeast under her breast and we have given her some nystatin powder for this  No orders of the defined types were placed in this encounter. Orders Placed This Encounter   Medications    nystatin (MYCOSTATIN) 063630 UNIT/GM powder     Sig: Apply 3 times daily. Dispense:  3 Bottle     Refill:  1    trospium (SANCTURA) 20 MG tablet     Sig: Take 1 tablet by mouth nightly     Dispense:  90 tablet     Refill:  1       TAMRA Martinez am scribing for and in the presence and direction of Dr. Hugh Vizcaino. 8/4/2020   MORGAN Rogers, Dr. Hugh Vizcaino, personally performed the services described in this documentation as scribed by the clinical staff in my presence, and it is both accurate and complete.

## 2020-08-04 NOTE — LETTER
[address]  Urogynecology  [facility] [address] [facility]  Phone: [phone]  Fax: [phone]    Jessica Nesbitt MD        August 4, 2020     [address] [address]    Patient: Mittie Aschoff   MR Number: <[id number]>   YOB: 1935   Date of Visit: 8/4/2020       Dear Dr. Dale Banks:    Thank you for referring Laureano Wilder to me for evaluation. Below are the relevant portions of my assessment and plan of care. If you have questions, please do not hesitate to call me. I look forward to following Ameena Sherman along with you.     Sincerely,        Jessica Nesbitt MD

## 2020-11-04 ENCOUNTER — OFFICE VISIT (OUTPATIENT)
Dept: ORTHOPEDIC SURGERY | Age: 85
End: 2020-11-04
Payer: MEDICARE

## 2020-11-04 VITALS — TEMPERATURE: 98.4 F | BODY MASS INDEX: 25.44 KG/M2 | WEIGHT: 149 LBS | HEIGHT: 64 IN

## 2020-11-04 PROCEDURE — G8427 DOCREV CUR MEDS BY ELIG CLIN: HCPCS | Performed by: ORTHOPAEDIC SURGERY

## 2020-11-04 PROCEDURE — G8484 FLU IMMUNIZE NO ADMIN: HCPCS | Performed by: ORTHOPAEDIC SURGERY

## 2020-11-04 PROCEDURE — 1123F ACP DISCUSS/DSCN MKR DOCD: CPT | Performed by: ORTHOPAEDIC SURGERY

## 2020-11-04 PROCEDURE — 1036F TOBACCO NON-USER: CPT | Performed by: ORTHOPAEDIC SURGERY

## 2020-11-04 PROCEDURE — G8417 CALC BMI ABV UP PARAM F/U: HCPCS | Performed by: ORTHOPAEDIC SURGERY

## 2020-11-04 PROCEDURE — 4040F PNEUMOC VAC/ADMIN/RCVD: CPT | Performed by: ORTHOPAEDIC SURGERY

## 2020-11-04 PROCEDURE — G8400 PT W/DXA NO RESULTS DOC: HCPCS | Performed by: ORTHOPAEDIC SURGERY

## 2020-11-04 PROCEDURE — 1090F PRES/ABSN URINE INCON ASSESS: CPT | Performed by: ORTHOPAEDIC SURGERY

## 2020-11-04 PROCEDURE — 99213 OFFICE O/P EST LOW 20 MIN: CPT | Performed by: ORTHOPAEDIC SURGERY

## 2020-11-04 NOTE — PROGRESS NOTES
DIAGNOSIS:  Left femur valgus impacted neck fracture, status post Hip pinning with cannulated screws. DATE OF SURGERY:  4/16/2019 . HISTORY OF PRESENT ILLNESS: Sheldon Pena 80 y.o.  female  who came in today for follow up visit. Family states when she walks without a walker they noticed that she favors the left hip and limps more. She denies increased pain. She does states she intermittently has left lateral hip pain and rates a 2/10 VAS. With rest she has no pain, her pain increases when walking without the walker. She has been working on ROM and WBAT using a walker. She has completed physical therapy. No numbness or tingling sensation. No fever or Chills.      Past Medical History:   Diagnosis Date    Acute cystitis with hematuria     Acute cystitis without hematuria     Acute metabolic encephalopathy     Adhesive capsulitis of unspecified shoulder     Alkaline phosphatase elevation     Alopecia hereditaria     Basal cell carcinoma (BCC) of face     Cancer (HCC)     SKIN    Chondrodermatitis nodularis chronica helicis     Chondromalacia     Chronic idiopathic constipation     Chronic neck pain     Closed left hip fracture (HCC)     Continuous leakage of urine     Esophagitis     Facet arthropathy, cervical     Glaucoma     Hypercholesterolemia     Hyperlipidemia     Hypertension     Hyponatremia     Hypothyroidism     IBS (irritable bowel syndrome)     Nocturia     Nonmelanoma skin cancer     Osteoarthritis     Osteoarthritis     Overactive bladder     Recurrent UTI     Seasonal allergies     Senile purpura (HCC)     Sepsis (HCC)     Shoulder pain     Spinal stenosis     Thyroid disease     Urge incontinence     Urinary frequency     Vaginal atrophy     Vitamin D deficiency     Weight loss        Past Surgical History:   Procedure Laterality Date    COLONOSCOPY  12/04/2018    Silver-normal    CYST REMOVAL      EYE SURGERY      CATARACT    HIP PINNING Left 4/16/2019    LEFT HIP PINNING performed by Susu Canseco MD at [address] FLX DX W/COLLJ [address] WHEN PFRMD N/A 12/4/2018    COLONOSCOPY DIAGNOSTIC OR SCREENING performed by Trevin Ocampo MD at [address] [facility] ENDOSCOPY  10/18/2016    Dr Elijah Martinez History     Socioeconomic History    Marital status:      Spouse name: Not on file    Number of children: Not on file    Years of education: Not on file    Highest education level: Not on file   Occupational History    Occupation: retired   Social Needs    Financial resource strain: Not on file    Food insecurity     Worry: Not on file     Inability: Not on file   [employer] needs     Medical: Not on file     Non-medical: Not on file   Tobacco Use    Smoking status: Never Smoker    Smokeless tobacco: Never Used   Substance and Sexual Activity    Alcohol use: No    Drug use: No    Sexual activity: Not on file   Lifestyle    Physical activity     Days per week: Not on file     Minutes per session: Not on file    Stress: Not on file   Relationships    Social connections     Talks on phone: Not on file     Gets together: Not on file     Attends Holiness service: Not on file     Active member of club or organization: Not on file     Attends meetings of clubs or organizations: Not on file     Relationship status: Not on file    Intimate partner violence     Fear of current or ex partner: Not on file     Emotionally abused: Not on file     Physically abused: Not on file     Forced sexual activity: Not on file   Other Topics Concern    Not on file   Social History Narrative    Not on file       Family History   Problem Relation Age of Onset    Diabetes Mother     Stroke Mother     Heart Disease Mother     COPD Brother        Current Outpatient Medications on File Prior to Visit   Medication Sig Dispense Refill    trimethoprim (TRIMPEX) 100 MG tablet Take 100 mg by mouth daily      nystatin (MYCOSTATIN) 288632 UNIT/GM powder Apply 3 times daily. 3 Bottle 1    trospium (SANCTURA) 20 MG tablet Take 1 tablet by mouth nightly 90 tablet 1    aspirin 81 MG EC tablet Take 1 tablet by mouth daily 30 tablet 3    Cranberry-Vitamin C-Vitamin E 4200-20-3 MG-MG-UNIT CAPS Take 1 capsule by mouth daily      clotrimazole-betamethasone (LOTRISONE) 1-0.05 % cream Apply topically 2 times daily Apply topically 2 times daily.  vitamin D (ERGOCALCIFEROL) 12936 units CAPS capsule Take 50,000 Units by mouth every 28 days      naproxen sodium (ALEVE) 220 MG tablet Take 220 mg by mouth 2 times daily as needed for Pain      docusate sodium (COLACE, DULCOLAX) 100 MG CAPS Take 100 mg by mouth 2 times daily      vitamin D (CHOLECALCIFEROL) 1000 UNIT TABS tablet Take 1,000 Units by mouth daily      conjugated estrogens (PREMARIN) 0.625 MG/GM vaginal cream Place 0.5 g vaginally Twice a Week       triamcinolone (KENALOG) 0.1 % ointment Apply topically 2 times daily as needed       atorvastatin (LIPITOR) 40 MG tablet Take 40 mg by mouth daily       levothyroxine (SYNTHROID) 75 MCG tablet Take 75 mcg by mouth Daily       bimatoprost (LUMIGAN) 0.01 % SOLN ophthalmic drops Place 1 drop into both eyes nightly       brinzolamide-brimonidine 1-0.2 % SUSP Place 1 drop into both eyes 2 times daily       omeprazole (PRILOSEC) 20 MG delayed release capsule Take 20 mg by mouth daily        No current facility-administered medications on file prior to visit. Pertinent items are noted in HPI  Review of systems reviewed from Patient History Form dated on 11/4/2020 and available in the patient's chart under the Media tab. No change noted. PHYSICAL EXAMINATION:  Ms. Jeff Mcgrath is a very pleasant 80 y.o.  female who presents today in no acute distress, awake, alert, and oriented. She is well dressed, nourished and  groomed. Patient with normal affect.   Height is  5' 4\" (1.626 m), weight is 149 lb (67.6 kg), Body mass index is 25.58 kg/m². Resting respiratory rate is 16. The patient walks with mild limp using a walker, slow gait. The incision is healed well, left hip. No signs of any erythema or drainage. She has no pain with the active or passive range of motion of the left hip. She has intact sensation, distally, and  is neurovascularly intact. Ankle reflex 1+ bilaterally. Good strength, and no instability both upper and lower extremities. She is nontender to palpation over the bilateral greater trochanteric bursa. She has a negative straight leg raise on the left. IMAGING:  Two views left hip and AP pelvis taken today in the office showed good alignment of the impacted femoral neck fracture, 3 cannulated screws in good position, no loosening, or hardware failure. No significant signs of AVN. There are mild changes in the femoral head, stable. IMPRESSION: 18 months out from left hip pinning with cannulated screws, and doing very well. PLAN: Discussed with the family that no significant changes were seen in the femoral head and discussed the possibility of AVN. I have told the patient to work on ROM and strengthening exercises that was given to her by PT,  WBAT as well as strengthening exercises. Recommend she use a walker for stability with ambulating. She can go back to normal activity with no restrictions. F/U PRN. As this patient has demonstrated risk factors for osteoporosis, such as age greater than [de-identified] years and evidence of a fracture, I have referred the patient back to the primary care physician for evaluation for osteoporosis, including consideration for DEXA scanning, if this is felt to be clinically indicated. The patient is advised to contact the primary care physician to follow-up for further evaluation.        Mariola Christopher MD

## 2020-12-28 RX ORDER — TROSPIUM CHLORIDE 20 MG/1
20 TABLET, FILM COATED ORAL NIGHTLY
Qty: 90 TABLET | Refills: 1 | Status: SHIPPED | OUTPATIENT
Start: 2020-12-28 | End: 2021-03-03 | Stop reason: SDUPTHER

## 2021-03-03 ENCOUNTER — OFFICE VISIT (OUTPATIENT)
Dept: UROGYNECOLOGY | Age: 86
End: 2021-03-03
Payer: MEDICARE

## 2021-03-03 VITALS
OXYGEN SATURATION: 96 % | DIASTOLIC BLOOD PRESSURE: 89 MMHG | SYSTOLIC BLOOD PRESSURE: 142 MMHG | HEART RATE: 89 BPM | TEMPERATURE: 96.9 F | RESPIRATION RATE: 22 BRPM

## 2021-03-03 DIAGNOSIS — N39.41 URGE INCONTINENCE: ICD-10-CM

## 2021-03-03 DIAGNOSIS — R39.15 URINARY URGENCY: ICD-10-CM

## 2021-03-03 DIAGNOSIS — N95.2 VAGINAL ATROPHY: Primary | ICD-10-CM

## 2021-03-03 DIAGNOSIS — R35.0 URINARY FREQUENCY: ICD-10-CM

## 2021-03-03 PROCEDURE — 1090F PRES/ABSN URINE INCON ASSESS: CPT | Performed by: OBSTETRICS & GYNECOLOGY

## 2021-03-03 PROCEDURE — 1123F ACP DISCUSS/DSCN MKR DOCD: CPT | Performed by: OBSTETRICS & GYNECOLOGY

## 2021-03-03 PROCEDURE — 0509F URINE INCON PLAN DOCD: CPT | Performed by: OBSTETRICS & GYNECOLOGY

## 2021-03-03 PROCEDURE — G8417 CALC BMI ABV UP PARAM F/U: HCPCS | Performed by: OBSTETRICS & GYNECOLOGY

## 2021-03-03 PROCEDURE — 4040F PNEUMOC VAC/ADMIN/RCVD: CPT | Performed by: OBSTETRICS & GYNECOLOGY

## 2021-03-03 PROCEDURE — 1036F TOBACCO NON-USER: CPT | Performed by: OBSTETRICS & GYNECOLOGY

## 2021-03-03 PROCEDURE — 99214 OFFICE O/P EST MOD 30 MIN: CPT | Performed by: OBSTETRICS & GYNECOLOGY

## 2021-03-03 PROCEDURE — G8400 PT W/DXA NO RESULTS DOC: HCPCS | Performed by: OBSTETRICS & GYNECOLOGY

## 2021-03-03 PROCEDURE — G8427 DOCREV CUR MEDS BY ELIG CLIN: HCPCS | Performed by: OBSTETRICS & GYNECOLOGY

## 2021-03-03 PROCEDURE — G8484 FLU IMMUNIZE NO ADMIN: HCPCS | Performed by: OBSTETRICS & GYNECOLOGY

## 2021-03-03 RX ORDER — BRIMONIDINE TARTRATE 2 MG/ML
1 SOLUTION/ DROPS OPHTHALMIC 3 TIMES DAILY
COMMUNITY

## 2021-03-03 RX ORDER — GABAPENTIN 100 MG/1
CAPSULE ORAL
COMMUNITY
Start: 2021-02-01 | End: 2021-10-13

## 2021-03-03 RX ORDER — TROSPIUM CHLORIDE 20 MG/1
20 TABLET, FILM COATED ORAL NIGHTLY
Qty: 90 TABLET | Refills: 1 | Status: SHIPPED | OUTPATIENT
Start: 2021-03-03 | End: 2021-07-09

## 2021-03-03 RX ORDER — TRIMETHOPRIM 100 MG/1
100 TABLET ORAL DAILY
Qty: 30 TABLET | Refills: 5 | Status: SHIPPED | OUTPATIENT
Start: 2021-03-03

## 2021-03-03 RX ORDER — FLUCONAZOLE 150 MG/1
150 TABLET ORAL WEEKLY
COMMUNITY
Start: 2021-02-01 | End: 2021-10-13

## 2021-03-03 NOTE — LETTER
[address]  Urogynecology  [address] [facility]  Phone: [phone]  Fax: [phone]    Jose Alfredo Anne MD        March 3, 2021        Dear Dr. Luci Renee,    I had the pleasure of seeing Claude Ruiz back in the office today. She is doing much better with her urinary tract infections however she still struggles with her urinary incontinence. Given the fact that she fell recently I think keeping her on the Alicia Rank is a good idea. I also heard that you guys are moving on. Congratulations. Hope to cross your path soon. Sincerely,    Yfn Whitt MD   3/3/2021       HPI:     Name: Myke Heart  YOB: 1935    CC: Patient is a 80 y.o. presenting for evaluation of urge incontinence  and Vaginal Atrophy. HPI: How long have you had this problem? years  Please rate the severity of your problem: mild  Anything make it better? The medicine has helped.     Ob/Gyn History:    OB History    Para Term  AB Living   5 3     2     SAB TAB Ectopic Molar Multiple Live Births   2                # Outcome Date GA Lbr Donny/2nd Weight Sex Delivery Anes PTL Lv   5 SAB            4 SAB            3 Para            2 Para            1 Para              Past Medical History:   Past Medical History:   Diagnosis Date    Acute cystitis with hematuria     Acute cystitis without hematuria     Acute metabolic encephalopathy     Adhesive capsulitis of unspecified shoulder     Alkaline phosphatase elevation     Alopecia hereditaria     Basal cell carcinoma (BCC) of face     Cancer (HCC)     SKIN    Chondrodermatitis nodularis chronica helicis     Chondromalacia     Chronic idiopathic constipation     Chronic neck pain     Closed left hip fracture (HCC)     Continuous leakage of urine     Esophagitis     Facet arthropathy, cervical     Glaucoma     Hypercholesterolemia     Hyperlipidemia     Hypertension     Hyponatremia  Hypothyroidism     IBS (irritable bowel syndrome)     Nocturia     Nonmelanoma skin cancer     Osteoarthritis     Osteoarthritis     Overactive bladder     Recurrent UTI     Seasonal allergies     Senile purpura (HCC)     Sepsis (HCC)     Shoulder pain     Spinal stenosis     Thyroid disease     Urge incontinence     Urinary frequency     Vaginal atrophy     Vitamin D deficiency     Weight loss      Past Surgical History:   Past Surgical History:   Procedure Laterality Date    COLONOSCOPY  12/04/2018    Silver-normal    CYST REMOVAL      EYE SURGERY      CATARACT    HIP PINNING Left 4/16/2019    LEFT HIP PINNING performed by Pj Hager MD at [address] FLX DX W/COLLJ SPEC WHEN PFRMD N/A 12/4/2018    COLONOSCOPY DIAGNOSTIC OR SCREENING performed by Boo Harmon MD at [address] [facility] ENDOSCOPY  10/18/2016    Dr Damien Smith     Allergies: Allergies   Allergen Reactions    Desmopressin Acetate Other (See Comments)     Eye irritation  Confusion and sodium low    Amoxicillin      diarrhea    Penicillins Rash     Current Medications:  Current Outpatient Medications   Medication Sig Dispense Refill    gabapentin (NEURONTIN) 100 MG capsule 1 hs for 3 nights then 2 hs for 3 nights then 3 hs      fluconazole (DIFLUCAN) 150 MG tablet Take 150 mg by mouth once a week      brimonidine (ALPHAGAN) 0.2 % ophthalmic solution 1 drop 3 times daily      trospium (SANCTURA) 20 MG tablet Take 1 tablet by mouth nightly 90 tablet 1    trimethoprim (TRIMPEX) 100 MG tablet Take 1 tablet by mouth daily 30 tablet 5    nystatin (MYCOSTATIN) 634947 UNIT/GM powder Apply 3 times daily.  3 Bottle 1    aspirin 81 MG EC tablet Take 1 tablet by mouth daily 30 tablet 3    Cranberry-Vitamin C-Vitamin E 4200-20-3 MG-MG-UNIT CAPS Take 1 capsule by mouth daily  clotrimazole-betamethasone (LOTRISONE) 1-0.05 % cream Apply topically 2 times daily Apply topically 2 times daily.  vitamin D (ERGOCALCIFEROL) 00713 units CAPS capsule Take 50,000 Units by mouth every 28 days      naproxen sodium (ALEVE) 220 MG tablet Take 220 mg by mouth 2 times daily as needed for Pain      docusate sodium (COLACE, DULCOLAX) 100 MG CAPS Take 100 mg by mouth 2 times daily      vitamin D (CHOLECALCIFEROL) 1000 UNIT TABS tablet Take 1,000 Units by mouth daily      conjugated estrogens (PREMARIN) 0.625 MG/GM vaginal cream Place 0.5 g vaginally Twice a Week       triamcinolone (KENALOG) 0.1 % ointment Apply topically 2 times daily as needed       atorvastatin (LIPITOR) 40 MG tablet Take 40 mg by mouth daily       levothyroxine (SYNTHROID) 75 MCG tablet Take 75 mcg by mouth Daily       bimatoprost (LUMIGAN) 0.01 % SOLN ophthalmic drops Place 1 drop into both eyes nightly       brinzolamide-brimonidine 1-0.2 % SUSP Place 1 drop into both eyes 2 times daily       omeprazole (PRILOSEC) 20 MG delayed release capsule Take 20 mg by mouth daily        No current facility-administered medications for this visit.       Social History:   Social History     Socioeconomic History    Marital status:      Spouse name: Not on file    Number of children: Not on file    Years of education: Not on file    Highest education level: Not on file   Occupational History    Occupation: retired   Social Needs    Financial resource strain: Not on file    Food insecurity     Worry: Not on file     Inability: Not on file   [employer] needs     Medical: Not on file     Non-medical: Not on file   Tobacco Use    Smoking status: Never Smoker    Smokeless tobacco: Never Used   Substance and Sexual Activity    Alcohol use: No    Drug use: No    Sexual activity: Not on file   Lifestyle    Physical activity     Days per week: Not on file     Minutes per session: Not on file  Stress: Not on file   Relationships    Social connections     Talks on phone: Not on file     Gets together: Not on file     Attends Yarsanism service: Not on file     Active member of club or organization: Not on file     Attends meetings of clubs or organizations: Not on file     Relationship status: Not on file    Intimate partner violence     Fear of current or ex partner: Not on file     Emotionally abused: Not on file     Physically abused: Not on file     Forced sexual activity: Not on file   Other Topics Concern    Not on file   Social History Narrative    Not on file     Family History:   Family History   Problem Relation Age of Onset    Diabetes Mother     Stroke Mother     Heart Disease Mother     COPD Brother      Review of System   Review of Systems   Skin: Positive for rash. Hematological: Bruises/bleeds easily. A review of systems was done by the patient and reviewed by me and scanned into media today. Objective:     Vitals  Vitals:    03/03/21 1427   BP: (!) 142/89   Pulse: 89   Resp: 22   Temp: 96.9 °F (36.1 °C)   SpO2: 96%     Physical Exam  Physical Exam  HENT:      Head: Normocephalic and atraumatic. Eyes:      Conjunctiva/sclera: Conjunctivae normal.   Neck:      Musculoskeletal: Normal range of motion and neck supple. Pulmonary:      Effort: Pulmonary effort is normal.   Abdominal:      Palpations: Abdomen is soft. Skin:     General: Skin is warm and dry. Neurological:      Mental Status: She is alert and oriented to person, place, and time. No results found for this visit on 03/03/21. Assessment/Plan:     Celio Miguel is a 80 y.o. female with   1. Vaginal atrophy    2. Urinary urgency    3. Urge incontinence    4.  Urinary frequency

## 2021-03-03 NOTE — PROGRESS NOTES
3/3/2021       HPI:     Name: Mary Anne Pascual  YOB: 1935    CC: Patient is a 80 y.o. presenting for evaluation of urge incontinence  and Vaginal Atrophy. HPI: How long have you had this problem? years  Please rate the severity of your problem: mild  Anything make it better? The medicine has helped.     Ob/Gyn History:    OB History    Para Term  AB Living   5 3     2     SAB TAB Ectopic Molar Multiple Live Births   2                # Outcome Date GA Lbr Donny/2nd Weight Sex Delivery Anes PTL Lv   5 SAB            4 SAB            3 Para            2 Para            1 Para              Past Medical History:   Past Medical History:   Diagnosis Date    Acute cystitis with hematuria     Acute cystitis without hematuria     Acute metabolic encephalopathy     Adhesive capsulitis of unspecified shoulder     Alkaline phosphatase elevation     Alopecia hereditaria     Basal cell carcinoma (BCC) of face     Cancer (HCC)     SKIN    Chondrodermatitis nodularis chronica helicis     Chondromalacia     Chronic idiopathic constipation     Chronic neck pain     Closed left hip fracture (HCC)     Continuous leakage of urine     Esophagitis     Facet arthropathy, cervical     Glaucoma     Hypercholesterolemia     Hyperlipidemia     Hypertension     Hyponatremia     Hypothyroidism     IBS (irritable bowel syndrome)     Nocturia     Nonmelanoma skin cancer     Osteoarthritis     Osteoarthritis     Overactive bladder     Recurrent UTI     Seasonal allergies     Senile purpura (HCC)     Sepsis (HCC)     Shoulder pain     Spinal stenosis     Thyroid disease     Urge incontinence     Urinary frequency     Vaginal atrophy     Vitamin D deficiency     Weight loss      Past Surgical History:   Past Surgical History:   Procedure Laterality Date    COLONOSCOPY  2018    Silver-normal    CYST REMOVAL      EYE SURGERY      CATARACT    HIP PINNING Left 2019 LEFT HIP PINNING performed [place] Naz[place] [address] FL[place] DX W/COLLZENAIDA Huong 1978 PFRMD N/A 12/4/2018    COLONOSCOPY DIAGNOSTIC OR SCREENING performed by Margaret Germain MD at [address] [facility] ENDOSCOPY  10/18/2016    Dr Milana Delgadillo     Allergies: Allergies   Allergen Reactions    Desmopressin Acetate Other (See Comments)     Eye irritation  Confusion and sodium low    Amoxicillin      diarrhea    Penicillins Rash     Current Medications:  Current Outpatient Medications   Medication Sig Dispense Refill    gabapentin (NEURONTIN) 100 MG capsule 1 hs for 3 nights then 2 hs for 3 nights then 3 hs      fluconazole (DIFLUCAN) 150 MG tablet Take 150 mg by mouth once a week      brimonidine (ALPHAGAN) 0.2 % ophthalmic solution 1 drop 3 times daily      trospium (SANCTURA) 20 MG tablet Take 1 tablet by mouth nightly 90 tablet 1    trimethoprim (TRIMPEX) 100 MG tablet Take 1 tablet by mouth daily 30 tablet 5    nystatin (MYCOSTATIN) 675247 UNIT/GM powder Apply 3 times daily. 3 Bottle 1    aspirin 81 MG EC tablet Take 1 tablet by mouth daily 30 tablet 3    Cranberry-Vitamin C-Vitamin E 4200-20-3 MG-MG-UNIT CAPS Take 1 capsule by mouth daily      clotrimazole-betamethasone (LOTRISONE) 1-0.05 % cream Apply topically 2 times daily Apply topically 2 times daily.       vitamin D (ERGOCALCIFEROL) 20815 units CAPS capsule Take 50,000 Units by mouth every 28 days      naproxen sodium (ALEVE) 220 MG tablet Take 220 mg by mouth 2 times daily as needed for Pain      docusate sodium (COLACE, DULCOLAX) 100 MG CAPS Take 100 mg by mouth 2 times daily      vitamin D (CHOLECALCIFEROL) 1000 UNIT TABS tablet Take 1,000 Units by mouth daily      conjugated estrogens (PREMARIN) 0.625 MG/GM vaginal cream Place 0.5 g vaginally Twice a Week       triamcinolone (KENALOG) 0.1 % ointment Apply topically 2 times daily as needed  Heart Disease Mother     COPD Brother      Review of System   Review of Systems   Skin: Positive for rash. Hematological: Bruises/bleeds easily. A review of systems was done by the patient and reviewed by me and scanned into media today. Objective:     Vitals  Vitals:    03/03/21 1427   BP: (!) 142/89   Pulse: 89   Resp: 22   Temp: 96.9 °F (36.1 °C)   SpO2: 96%     Physical Exam  Physical Exam  HENT:      Head: Normocephalic and atraumatic. Eyes:      Conjunctiva/sclera: Conjunctivae normal.   Neck:      Musculoskeletal: Normal range of motion and neck supple. Pulmonary:      Effort: Pulmonary effort is normal.   Abdominal:      Palpations: Abdomen is soft. Skin:     General: Skin is warm and dry. Neurological:      Mental Status: She is alert and oriented to person, place, and time. No results found for this visit on 03/03/21. Assessment/Plan:     Ger Layton is a 80 y.o. female with   1. Vaginal atrophy    2. Urinary urgency    3. Urge incontinence    4. Urinary frequency      She is having no bladder infections but she still struggles with bladder leakage. We have tried many different combinations for her leakage but we have not been successful in getting it 100% better. I think keeping with the sanctura would be the best.  No orders of the defined types were placed in this encounter.     Orders Placed This Encounter   Medications    trospium (SANCTURA) 20 MG tablet     Sig: Take 1 tablet by mouth nightly     Dispense:  90 tablet     Refill:  1    trimethoprim (TRIMPEX) 100 MG tablet     Sig: Take 1 tablet by mouth daily     Dispense:  30 tablet     Refill:  5       TAMRA MARES [facility]

## 2021-03-09 ENCOUNTER — TELEPHONE (OUTPATIENT)
Dept: UROGYNECOLOGY | Age: 86
End: 2021-03-09

## 2021-03-10 ENCOUNTER — TELEPHONE (OUTPATIENT)
Dept: UROGYNECOLOGY | Age: 86
End: 2021-03-10

## 2021-03-10 NOTE — TELEPHONE ENCOUNTER
Per Dr Marge Saleh, pt may continue to take Trospium per Her Specific Glaucoma. Called [facility] and verified if pt filled Trospium. Pt filled RX today. Called  and updated pts . pts  updated that pt may continue RX. He verbalized understanding.

## 2021-03-10 NOTE — TELEPHONE ENCOUNTER
[employer] sent a form to our office  questioning if pt could take Trospium with her Glaucoma. Per Dr DICK - [facility], What kind of Glaucoma does pt have? Pt made aware and will call back.

## 2021-03-15 ENCOUNTER — TELEPHONE (OUTPATIENT)
Dept: UROGYNECOLOGY | Age: 86
End: 2021-03-15

## 2021-03-15 RX ORDER — NYSTATIN 100000 [USP'U]/G
POWDER TOPICAL
Qty: 3 BOTTLE | Refills: 1 | Status: SHIPPED | OUTPATIENT
Start: 2021-03-15 | End: 2021-03-26 | Stop reason: SDUPTHER

## 2021-03-26 RX ORDER — NYSTATIN 100000 [USP'U]/G
POWDER TOPICAL
Qty: 3 BOTTLE | Refills: 1 | Status: SHIPPED | OUTPATIENT
Start: 2021-03-26

## 2021-03-26 NOTE — PROGRESS NOTES
Patient is requesting her nystatin script be sent to [facility]. Per Dr. Mary Draper, order resent to pharmacy of choice.

## 2021-03-29 NOTE — PROGRESS NOTES
Pt picking at hip dressing. Reminded pt to not pull at dressing. Pt VU. Blankets placed over dressing. Mikaela Meeks  Notified.   To room per bed with transport assist. Calcipotriene Pregnancy And Lactation Text: This medication has not been proven safe during pregnancy. It is unknown if this medication is excreted in breast milk.

## 2021-05-26 ENCOUNTER — OFFICE VISIT (OUTPATIENT)
Dept: ORTHOPEDIC SURGERY | Age: 86
End: 2021-05-26
Payer: MEDICARE

## 2021-05-26 VITALS — BODY MASS INDEX: 25.44 KG/M2 | HEIGHT: 64 IN | WEIGHT: 149 LBS

## 2021-05-26 DIAGNOSIS — S72.002A LEFT DISPLACED FEMORAL NECK FRACTURE (HCC): Primary | ICD-10-CM

## 2021-05-26 DIAGNOSIS — M54.32 SCIATICA OF LEFT SIDE: ICD-10-CM

## 2021-05-26 PROCEDURE — 1036F TOBACCO NON-USER: CPT | Performed by: ORTHOPAEDIC SURGERY

## 2021-05-26 PROCEDURE — 1090F PRES/ABSN URINE INCON ASSESS: CPT | Performed by: ORTHOPAEDIC SURGERY

## 2021-05-26 PROCEDURE — 1123F ACP DISCUSS/DSCN MKR DOCD: CPT | Performed by: ORTHOPAEDIC SURGERY

## 2021-05-26 PROCEDURE — G8417 CALC BMI ABV UP PARAM F/U: HCPCS | Performed by: ORTHOPAEDIC SURGERY

## 2021-05-26 PROCEDURE — 4040F PNEUMOC VAC/ADMIN/RCVD: CPT | Performed by: ORTHOPAEDIC SURGERY

## 2021-05-26 PROCEDURE — G8400 PT W/DXA NO RESULTS DOC: HCPCS | Performed by: ORTHOPAEDIC SURGERY

## 2021-05-26 PROCEDURE — 99214 OFFICE O/P EST MOD 30 MIN: CPT | Performed by: ORTHOPAEDIC SURGERY

## 2021-05-26 PROCEDURE — G8427 DOCREV CUR MEDS BY ELIG CLIN: HCPCS | Performed by: ORTHOPAEDIC SURGERY

## 2021-05-26 RX ORDER — PREDNISONE 10 MG/1
TABLET ORAL
Qty: 26 TABLET | Refills: 0 | Status: SHIPPED | OUTPATIENT
Start: 2021-05-26 | End: 2021-10-13

## 2021-05-26 NOTE — PROGRESS NOTES
DIAGNOSIS:    1-Left femur valgus impacted neck fracture, status post Hip pinning with cannulated screws. 2-Low back pain/ sciatica    DATE OF SURGERY:  4/16/2019 . HISTORY OF PRESENT ILLNESS: Clarke Bryan 80 y.o.  female  who came in today for follow up visit. Family states when she walks without a walker they noticed that she favors the left hip and limps more. She states her pain has been worsening from her left low back all the way down to her left foot. Pain is worse near the left lateral hip and upper thigh. She rates her pain a 2/10 VAS. Pain is intermittent, achy. With rest she has no pain, her pain increases when walking without the walker. She has been working on ROM and WBAT using a walker. She has completed physical therapy. No numbness or tingling sensation. No fever or Chills.      Past Medical History:   Diagnosis Date    Acute cystitis with hematuria     Acute cystitis without hematuria     Acute metabolic encephalopathy     Adhesive capsulitis of unspecified shoulder     Alkaline phosphatase elevation     Alopecia hereditaria     Basal cell carcinoma (BCC) of face     Cancer (HCC)     SKIN    Chondrodermatitis nodularis chronica helicis     Chondromalacia     Chronic idiopathic constipation     Chronic neck pain     Closed left hip fracture (HCC)     Continuous leakage of urine     Esophagitis     Facet arthropathy, cervical     Glaucoma     Primary open angled glaucoma    Hypercholesterolemia     Hyperlipidemia     Hypertension     Hyponatremia     Hypothyroidism     IBS (irritable bowel syndrome)     Nocturia     Nonmelanoma skin cancer     Osteoarthritis     Osteoarthritis     Overactive bladder     Recurrent UTI     Seasonal allergies     Senile purpura (HCC)     Sepsis (HCC)     Shoulder pain     Spinal stenosis     Thyroid disease     Urge incontinence     Urinary frequency     Vaginal atrophy     Vitamin D deficiency     Weight loss        Past Surgical History:   Procedure Laterality Date    COLONOSCOPY  12/04/2018    Silver-normal    CYST REMOVAL      EYE SURGERY      CATARACT    HIP PINNING Left 4/16/2019    LEFT HIP PINNING performed by Malachi Hamilton MD at [address] FLX DX W/COLLJ Margaux [facility] [address] PFRMD N/A 12/4/2018    COLONOSCOPY DIAGNOSTIC OR SCREENING performed by Armando Pérez MD at [address] [facility] ENDOSCOPY  10/18/2016    Dr Zaina Arreola History     Socioeconomic History    Marital status:      Spouse name: Not on file    Number of children: Not on file    Years of education: Not on file    Highest education level: Not on file   Occupational History    Occupation: retired   Tobacco Use    Smoking status: Never Smoker    Smokeless tobacco: Never Used   Vaping Use    Vaping Use: Never used   Substance and Sexual Activity    Alcohol use: No    Drug use: No    Sexual activity: Not on file   Other Topics Concern    Not on file   Social History Narrative    Not on file     Social Determinants of Health     Financial Resource Strain:     Difficulty of Paying Living Expenses:    Food Insecurity:     Worried About [facility] in the Last Year:     [facility] in the Last Year:    Transportation Needs:     Lack of Transportation (Medical):      Lack of Transportation (Non-Medical):    Physical Activity:     Days of Exercise per Week:     Minutes of Exercise per Session:    Stress:     Feeling of Stress :    Social Connections:     Frequency of Communication with Friends and Family:     Frequency of Social Gatherings with Friends and Family:     Attends Gnosticist Services:     Active Member of Clubs or Organizations:     Attends Club or Organization Meetings:     Marital Status:    Intimate Partner Violence:     Fear of Current or Ex-Partner:     Emotionally Abused:     Physically Abused:     Sexually Abused:        Family History   Problem Relation Age of Onset    Diabetes Mother     Stroke Mother     Heart Disease Mother     COPD Brother        Current Outpatient Medications on File Prior to Visit   Medication Sig Dispense Refill    nystatin (MYCOSTATIN) 433902 UNIT/GM powder Apply 3 times daily. 3 Bottle 1    gabapentin (NEURONTIN) 100 MG capsule 1 hs for 3 nights then 2 hs for 3 nights then 3 hs      fluconazole (DIFLUCAN) 150 MG tablet Take 150 mg by mouth once a week      brimonidine (ALPHAGAN) 0.2 % ophthalmic solution 1 drop 3 times daily      trospium (SANCTURA) 20 MG tablet Take 1 tablet by mouth nightly 90 tablet 1    trimethoprim (TRIMPEX) 100 MG tablet Take 1 tablet by mouth daily 30 tablet 5    aspirin 81 MG EC tablet Take 1 tablet by mouth daily 30 tablet 3    Cranberry-Vitamin C-Vitamin E 4200-20-3 MG-MG-UNIT CAPS Take 1 capsule by mouth daily      clotrimazole-betamethasone (LOTRISONE) 1-0.05 % cream Apply topically 2 times daily Apply topically 2 times daily.       vitamin D (ERGOCALCIFEROL) 51453 units CAPS capsule Take 50,000 Units by mouth every 28 days      naproxen sodium (ALEVE) 220 MG tablet Take 220 mg by mouth 2 times daily as needed for Pain      docusate sodium (COLACE, DULCOLAX) 100 MG CAPS Take 100 mg by mouth 2 times daily      vitamin D (CHOLECALCIFEROL) 1000 UNIT TABS tablet Take 1,000 Units by mouth daily      conjugated estrogens (PREMARIN) 0.625 MG/GM vaginal cream Place 0.5 g vaginally Twice a Week       triamcinolone (KENALOG) 0.1 % ointment Apply topically 2 times daily as needed       atorvastatin (LIPITOR) 40 MG tablet Take 40 mg by mouth daily       levothyroxine (SYNTHROID) 75 MCG tablet Take 75 mcg by mouth Daily       bimatoprost (LUMIGAN) 0.01 % SOLN ophthalmic drops Place 1 drop into both eyes nightly       brinzolamide-brimonidine 1-0.2 % SUSP Place 1 drop into both eyes 2 times daily       omeprazole (PRILOSEC) 20 MG delayed release capsule Take 20 mg by mouth daily        No WBAT as well as strengthening exercises. Recommend she use a walker for stability with ambulating. She can go back to normal activity with no restrictions. Recommend a prednisone taper. Follow-up with our spine team as needed. F/U PRN. As this patient has demonstrated risk factors for osteoporosis, such as age greater than [de-identified] years and evidence of a fracture, I have referred the patient back to the primary care physician for evaluation for osteoporosis, including consideration for DEXA scanning, if this is felt to be clinically indicated. The patient is advised to contact the primary care physician to follow-up for further evaluation.        Nya Hidalgo MD

## 2021-05-31 PROBLEM — M54.32 SCIATICA OF LEFT SIDE: Status: ACTIVE | Noted: 2021-05-31

## 2021-07-09 RX ORDER — TROSPIUM CHLORIDE 20 MG/1
TABLET, FILM COATED ORAL
Qty: 90 TABLET | Refills: 3 | Status: SHIPPED | OUTPATIENT
Start: 2021-07-09

## 2021-07-26 ENCOUNTER — TELEPHONE (OUTPATIENT)
Dept: ORTHOPEDIC SURGERY | Age: 86
End: 2021-07-26

## 2021-07-26 NOTE — TELEPHONE ENCOUNTER
FAXED [facility] / [facility] ( [facility] ) 04/15/2019 TO DR. Michael Xavier @ [phone]

## 2021-08-11 ENCOUNTER — TELEPHONE (OUTPATIENT)
Dept: UROGYNECOLOGY | Age: 86
End: 2021-08-11

## 2021-08-11 NOTE — TELEPHONE ENCOUNTER
called stating that 2 of the presciptions (Trimethoprim 100mg and Trospiom CL 20 mg) are not being filled at their pharmacy ([facility]). Can we double check to see if they were sent to the correct pharmacy. Their number is [phone].

## 2021-10-13 ENCOUNTER — OFFICE VISIT (OUTPATIENT)
Dept: UROGYNECOLOGY | Age: 86
End: 2021-10-13
Payer: MEDICARE

## 2021-10-13 VITALS
HEART RATE: 62 BPM | TEMPERATURE: 98.2 F | DIASTOLIC BLOOD PRESSURE: 77 MMHG | RESPIRATION RATE: 14 BRPM | OXYGEN SATURATION: 98 % | SYSTOLIC BLOOD PRESSURE: 125 MMHG

## 2021-10-13 DIAGNOSIS — N95.2 VAGINAL ATROPHY: Primary | ICD-10-CM

## 2021-10-13 DIAGNOSIS — R39.15 URINARY URGENCY: ICD-10-CM

## 2021-10-13 DIAGNOSIS — N39.41 URGE INCONTINENCE: ICD-10-CM

## 2021-10-13 DIAGNOSIS — Z87.440 HISTORY OF RECURRENT UTIS: ICD-10-CM

## 2021-10-13 DIAGNOSIS — R35.0 URINARY FREQUENCY: ICD-10-CM

## 2021-10-13 PROBLEM — G30.1 DEMENTIA OF THE ALZHEIMER'S TYPE, WITH LATE ONSET, WITH DELIRIUM (HCC): Status: ACTIVE | Noted: 2021-04-21

## 2021-10-13 PROBLEM — F02.82 DEMENTIA OF THE ALZHEIMER'S TYPE, WITH LATE ONSET, WITH DELIRIUM (HCC): Status: ACTIVE | Noted: 2021-04-21

## 2021-10-13 PROCEDURE — G8484 FLU IMMUNIZE NO ADMIN: HCPCS | Performed by: OBSTETRICS & GYNECOLOGY

## 2021-10-13 PROCEDURE — 4040F PNEUMOC VAC/ADMIN/RCVD: CPT | Performed by: OBSTETRICS & GYNECOLOGY

## 2021-10-13 PROCEDURE — G8427 DOCREV CUR MEDS BY ELIG CLIN: HCPCS | Performed by: OBSTETRICS & GYNECOLOGY

## 2021-10-13 PROCEDURE — G8417 CALC BMI ABV UP PARAM F/U: HCPCS | Performed by: OBSTETRICS & GYNECOLOGY

## 2021-10-13 PROCEDURE — 1036F TOBACCO NON-USER: CPT | Performed by: OBSTETRICS & GYNECOLOGY

## 2021-10-13 PROCEDURE — 1090F PRES/ABSN URINE INCON ASSESS: CPT | Performed by: OBSTETRICS & GYNECOLOGY

## 2021-10-13 PROCEDURE — 1123F ACP DISCUSS/DSCN MKR DOCD: CPT | Performed by: OBSTETRICS & GYNECOLOGY

## 2021-10-13 PROCEDURE — 99214 OFFICE O/P EST MOD 30 MIN: CPT | Performed by: OBSTETRICS & GYNECOLOGY

## 2021-10-13 PROCEDURE — 0509F URINE INCON PLAN DOCD: CPT | Performed by: OBSTETRICS & GYNECOLOGY

## 2021-10-13 RX ORDER — DONEPEZIL HYDROCHLORIDE 10 MG/1
10 TABLET, FILM COATED ORAL NIGHTLY
COMMUNITY

## 2021-10-13 RX ORDER — HYDROCODONE BITARTRATE AND ACETAMINOPHEN 5; 325 MG/1; MG/1
1 TABLET ORAL EVERY 6 HOURS PRN
COMMUNITY

## 2021-10-13 RX ORDER — TERBINAFINE HYDROCHLORIDE 250 MG/1
250 TABLET ORAL DAILY
COMMUNITY

## 2021-10-13 NOTE — PROGRESS NOTES
10/13/2021       HPI:     Name: Jenny French  YOB: 1935    CC: Patient is a 80 y.o. presenting for evaluation of recurrent UTI, and urinary incontinence. HPI: How long have you had this problem? Please rate the severity of your problem: moderately severe  Anything make it better? Has not had the trimpex, or trospium refilled in about 3 months. She has not had any UTIs. They do not think the trospium has helped her at all.      Ob/Gyn History:    OB History    Para Term  AB Living   5 3     2     SAB TAB Ectopic Molar Multiple Live Births   2                # Outcome Date GA Lbr Donny/2nd Weight Sex Delivery Anes PTL Lv   5 SAB            4 SAB            3 Para            2 Para            1 Para              Past Medical History:   Past Medical History:   Diagnosis Date    Acute cystitis with hematuria     Acute cystitis without hematuria     Acute metabolic encephalopathy     Adhesive capsulitis of unspecified shoulder     Alkaline phosphatase elevation     Alopecia hereditaria     Basal cell carcinoma (BCC) of face     Cancer (HCC)     SKIN    Chondrodermatitis nodularis chronica helicis     Chondromalacia     Chronic idiopathic constipation     Chronic neck pain     Closed left hip fracture (HCC)     Continuous leakage of urine     Esophagitis     Facet arthropathy, cervical     Glaucoma     Primary open angled glaucoma    Hypercholesterolemia     Hyperlipidemia     Hypertension     Hyponatremia     Hypothyroidism     IBS (irritable bowel syndrome)     Nocturia     Nonmelanoma skin cancer     Osteoarthritis     Osteoarthritis     Overactive bladder     Recurrent UTI     Seasonal allergies     Senile purpura (HCC)     Sepsis (HCC)     Shoulder pain     Spinal stenosis     Thyroid disease     Urge incontinence     Urinary frequency     Vaginal atrophy     Vitamin D deficiency     Weight loss      Past Surgical History:   Past Surgical brinzolamide-brimonidine 1-0.2 % SUSP Place 1 drop into both eyes 2 times daily       omeprazole (PRILOSEC) 20 MG delayed release capsule Take 20 mg by mouth daily       trospium (SANCTURA) 20 MG tablet TAKE 1 TABLET BY MOUTH  EVERY NIGHT (Patient not taking: Reported on 10/13/2021) 90 tablet 3    nystatin (MYCOSTATIN) 039869 UNIT/GM powder Apply 3 times daily. (Patient not taking: Reported on 10/13/2021) 3 Bottle 1    trimethoprim (TRIMPEX) 100 MG tablet Take 1 tablet by mouth daily (Patient not taking: Reported on 10/13/2021) 30 tablet 5    naproxen sodium (ALEVE) 220 MG tablet Take 220 mg by mouth 2 times daily as needed for Pain (Patient not taking: Reported on 10/13/2021)       No current facility-administered medications for this visit. Social History:   Social History     Socioeconomic History    Marital status:      Spouse name: Not on file    Number of children: Not on file    Years of education: Not on file    Highest education level: Not on file   Occupational History    Occupation: retired   Tobacco Use    Smoking status: Never Smoker    Smokeless tobacco: Never Used   Vaping Use    Vaping Use: Never used   Substance and Sexual Activity    Alcohol use: No    Drug use: No    Sexual activity: Not on file   Other Topics Concern    Not on file   Social History Narrative    Not on file     Social Determinants of Health     Financial Resource Strain:     Difficulty of Paying Living Expenses:    Food Insecurity:     Worried About [address] in the Last Year:     [address] [facility] [address] in the Last Year:    Transportation Needs:     Lack of Transportation (Medical):      Lack of Transportation (Non-Medical):    Physical Activity:     Days of Exercise per Week:     Minutes of Exercise per Session:    Stress:     Feeling of Stress :    Social Connections:     Frequency of Communication with Friends and Family:     Frequency of Social Gatherings with Friends and Family:     Attends Scientologist Services:     Active Member of Clubs or Organizations:     Attends Club or Organization Meetings:     Marital Status:    Intimate Partner Violence:     Fear of Current or Ex-Partner:     Emotionally Abused:     Physically Abused:     Sexually Abused:      Family History:   Family History   Problem Relation Age of Onset    Diabetes Mother     Stroke Mother     Heart Disease Mother     COPD Brother      Review of System   Review of Systems   Constitutional: Positive for unexpected weight change. Endocrine: Positive for cold intolerance. Genitourinary: Positive for enuresis. Skin: Positive for rash. Hematological: Bruises/bleeds easily. All other systems reviewed and are negative. A review of systems was done by the patient and reviewed by me and scanned into media today. Objective:     Vitals  Vitals:    10/13/21 1431   BP: 125/77   Pulse: 62   Resp: 14   Temp: 98.2 °F (36.8 °C)   SpO2: 98%     Physical Exam  Physical Exam  HENT:      Head: Normocephalic and atraumatic. Eyes:      Conjunctiva/sclera: Conjunctivae normal.   Pulmonary:      Effort: Pulmonary effort is normal.   Abdominal:      Palpations: Abdomen is soft. Musculoskeletal:      Cervical back: Normal range of motion and neck supple. Skin:     General: Skin is warm and dry. Neurological:      Mental Status: She is alert and oriented to person, place, and time. No results found for this visit on 10/13/21. Assessment/Plan:     Sascha Pearson is a 80 y.o. female with   1. Vaginal atrophy    2. Urinary urgency    3. Urge incontinence    4. Urinary frequency    5. History of recurrent UTIs      She has not taken her Sanctura or the trimpex but has been doing well without an UTI's but she still suffers from bladder leakage. We have tried multiple different types of medications with no success. I did offer them botox today but they would like to wait.   I will have them just follow up with me if they have a recurrence of the UTI's in which case I would restart her on the trimpex. She was in the office for 25 minutes. No orders of the defined types were placed in this encounter. No orders of the defined types were placed in this encounter.       Abdias Gibson MD

## 2023-12-02 ENCOUNTER — APPOINTMENT (OUTPATIENT)
Dept: GENERAL RADIOLOGY | Age: 88
End: 2023-12-02
Payer: MEDICARE

## 2023-12-02 ENCOUNTER — APPOINTMENT (OUTPATIENT)
Dept: CT IMAGING | Age: 88
End: 2023-12-02
Payer: MEDICARE

## 2023-12-02 ENCOUNTER — HOSPITAL ENCOUNTER (EMERGENCY)
Age: 88
Discharge: HOME | End: 2023-12-02
Payer: MEDICARE

## 2023-12-02 VITALS
HEIGHT: 61 IN | SYSTOLIC BLOOD PRESSURE: 138 MMHG | RESPIRATION RATE: 16 BRPM | OXYGEN SATURATION: 95 % | WEIGHT: 161.38 LBS | DIASTOLIC BLOOD PRESSURE: 72 MMHG | HEART RATE: 72 BPM | TEMPERATURE: 97.1 F | BODY MASS INDEX: 30.47 KG/M2

## 2023-12-02 DIAGNOSIS — R29.6 FREQUENT FALLS: Primary | ICD-10-CM

## 2023-12-02 LAB
ALBUMIN SERPL-MCNC: 3.8 G/DL (ref 3.4–5)
ALBUMIN/GLOB SERPL: 1.1 {RATIO} (ref 1.1–2.2)
ALP SERPL-CCNC: 186 U/L (ref 40–129)
ALT SERPL-CCNC: 17 U/L (ref 10–40)
ANION GAP SERPL CALCULATED.3IONS-SCNC: 6 MMOL/L (ref 3–16)
AST SERPL-CCNC: 24 U/L (ref 15–37)
BACTERIA URNS QL MICRO: ABNORMAL /HPF
BASOPHILS # BLD: 0.1 K/UL (ref 0–0.2)
BASOPHILS NFR BLD: 1.3 %
BILIRUB SERPL-MCNC: 0.4 MG/DL (ref 0–1)
BILIRUB UR QL STRIP.AUTO: NEGATIVE
BUN SERPL-MCNC: 17 MG/DL (ref 7–20)
CALCIUM SERPL-MCNC: 8.9 MG/DL (ref 8.3–10.6)
CHARACTER UR: ABNORMAL
CHLORIDE SERPL-SCNC: 104 MMOL/L (ref 99–110)
CLARITY UR: ABNORMAL
CO2 SERPL-SCNC: 31 MMOL/L (ref 21–32)
COLOR UR: YELLOW
CREAT SERPL-MCNC: 0.7 MG/DL (ref 0.6–1.2)
DEPRECATED RDW RBC AUTO: 15.3 % (ref 12.4–15.4)
EOSINOPHIL # BLD: 0.1 K/UL (ref 0–0.6)
EOSINOPHIL NFR BLD: 2.3 %
EPI CELLS #/AREA URNS HPF: ABNORMAL /HPF (ref 0–5)
GFR SERPLBLD CREATININE-BSD FMLA CKD-EPI: >60 ML/MIN/{1.73_M2}
GLUCOSE SERPL-MCNC: 113 MG/DL (ref 70–99)
GLUCOSE UR STRIP.AUTO-MCNC: NEGATIVE MG/DL
HCT VFR BLD AUTO: 36.9 % (ref 36–48)
HGB BLD-MCNC: 12.4 G/DL (ref 12–16)
HGB UR QL STRIP.AUTO: NEGATIVE
HYALINE CASTS #/AREA URNS AUTO: 2 /LPF (ref 0–8)
KETONES UR STRIP.AUTO-MCNC: ABNORMAL MG/DL
LEUKOCYTE ESTERASE UR QL STRIP.AUTO: ABNORMAL
LYMPHOCYTES # BLD: 1.6 K/UL (ref 1–5.1)
LYMPHOCYTES NFR BLD: 27.2 %
MCH RBC QN AUTO: 28.1 PG (ref 26–34)
MCHC RBC AUTO-ENTMCNC: 33.8 G/DL (ref 31–36)
MCV RBC AUTO: 83.4 FL (ref 80–100)
MONOCYTES # BLD: 0.4 K/UL (ref 0–1.3)
MONOCYTES NFR BLD: 7.3 %
NEUTROPHILS # BLD: 3.5 K/UL (ref 1.7–7.7)
NEUTROPHILS NFR BLD: 61.9 %
NITRITE UR QL STRIP.AUTO: NEGATIVE
PH UR STRIP.AUTO: 6 [PH] (ref 5–8)
PLATELET # BLD AUTO: 200 K/UL (ref 135–450)
PMV BLD AUTO: 8 FL (ref 5–10.5)
POTASSIUM SERPL-SCNC: 3.7 MMOL/L (ref 3.5–5.1)
PROT SERPL-MCNC: 7.2 G/DL (ref 6.4–8.2)
PROT UR STRIP.AUTO-MCNC: NEGATIVE MG/DL
RBC # BLD AUTO: 4.42 M/UL (ref 4–5.2)
RBC #/AREA URNS HPF: ABNORMAL /HPF (ref 0–4)
SODIUM SERPL-SCNC: 141 MMOL/L (ref 136–145)
SP GR UR STRIP.AUTO: 1.02 (ref 1–1.03)
UA COMPLETE W REFLEX CULTURE PNL UR: ABNORMAL
UA DIPSTICK W REFLEX MICRO PNL UR: YES
URN SPEC COLLECT METH UR: ABNORMAL
UROBILINOGEN UR STRIP-ACNC: 1 E.U./DL
WBC # BLD AUTO: 5.7 K/UL (ref 4–11)
WBC #/AREA URNS AUTO: 5 /HPF (ref 0–5)

## 2023-12-02 PROCEDURE — 70450 CT HEAD/BRAIN W/O DYE: CPT

## 2023-12-02 PROCEDURE — 80053 COMPREHEN METABOLIC PANEL: CPT

## 2023-12-02 PROCEDURE — 85025 COMPLETE CBC W/AUTO DIFF WBC: CPT

## 2023-12-02 PROCEDURE — 99284 EMERGENCY DEPT VISIT MOD MDM: CPT

## 2023-12-02 PROCEDURE — 71045 X-RAY EXAM CHEST 1 VIEW: CPT

## 2023-12-02 PROCEDURE — 72125 CT NECK SPINE W/O DYE: CPT

## 2023-12-02 PROCEDURE — 81001 URINALYSIS AUTO W/SCOPE: CPT

## 2023-12-02 ASSESSMENT — PAIN - FUNCTIONAL ASSESSMENT: PAIN_FUNCTIONAL_ASSESSMENT: NONE - DENIES PAIN

## 2023-12-02 NOTE — DISCHARGE INSTRUCTIONS
Home in stable condition to continue conservative home care. Outpatient follow-up with family doctor as recommended as needed. Return to the ER for any emergency worsening or concern.

## 2023-12-02 NOTE — ED PROVIDER NOTES
**ADVANCED PRACTICE PROVIDER, I HAVE EVALUATED THIS PATIENT**        [address] [facility] [address] [address]      Pt Name: Jono Colon  [facility]:[id number]  [address] 1935  Date of evaluation: 12/2/2023  Provider: Sanford Cordova PA-C  Note Started: 4:29 PM EST 12/2/23        Chief Complaint:    Chief Complaint   Patient presents with    Fall     From memory care unit, concern for frequent falls over the last month. Daughter states pt is falling \"every other day\", mostly mechanical falls. Dementia. Nursing Notes, Past Medical Hx, Past Surgical Hx, Social Hx, Allergies, and Family Hx were all reviewed and agreed with or any disagreements were addressed in the HPI.    HPI: (Location, Duration, Timing, Severity, Quality, Assoc Sx, Context, Modifying factors)    History From: Patient's daughter  Limitations to history : Patient with dementia, poor historian, comes from nursing facility        Chief Complaint of concern because of frequent falls    This is a  80 y.o. female who presents and her daughter gives the history of wondering if we could take a look at the patient with possibly some labs and imaging to see if there is any particular reason that she is having some any falls and persistent mental status changes. Patient's daughter does admit that the patient does have dementia and knows that some things just change negatively with time. However patient was also seen by medical staff at the nursing facility where she lives and they indicated that may be some more evaluation in the ER might be valuable. No reported recent injury in the last few days or known fall today. Patient denying any worse pain than usual currently but she does admit that her tailbone sometimes hurts like when somebody pushes her over or if someone throws a box or basket full of heavy jugs of water at her so she has to catch it it will caused her to fall and hit her backside.   She agrees idiopathic constipation, Chronic neck pain, Closed left hip fracture (HCC), Continuous leakage of urine, Esophagitis, Facet arthropathy, cervical, Glaucoma, Hypercholesterolemia, Hyperlipidemia, Hypertension, Hyponatremia, Hypothyroidism, IBS (irritable bowel syndrome), Nocturia, Nonmelanoma skin cancer, Osteoarthritis, Osteoarthritis, Overactive bladder, Recurrent UTI, Seasonal allergies, Senile purpura ([address]), Sepsis ([address]), Shoulder pain, Spinal stenosis, Thyroid disease, Urge incontinence, Urinary frequency, Vaginal atrophy, Vitamin D deficiency, and Weight loss. Records Reviewed (External and Source)   CC/HPI Summary, DDx, ED Course, and Reassessment: This patient presents as above and evaluation and treatment is begun here. 1 view chest x-ray negative for acute infiltrate or effusion as interpreted by myself in absence of radiologist.  CT head plain read by radiology as:  1. No acute intracranial abnormality. They mention possible laceration but there is no acute laceration but rather a chronic complex set of skin growths on the patient's anterior scalp. Also the CT cervical spine images come back showing largely degenerative changes. I did discuss the radiologic findings with patient's daughter at bedside and she is content as patient is not having any new tenderness or any history or external exam findings indicating acute injury to this area. Labs also returned looking good with no urinary tract infection evident. CMP looking quite stable. CBC looking great. Disposition Considerations (Tests not ordered but considered, Shared Decision Making, Pt Expectation of Test or Tx.): All the above is discussed with patient's daughter at bedside. At this time no evidence for acute system compromise or need for further emergent evaluation IV therapy or inpatient care. Other conservative home care discussed recommended and agreed upon.   Patient will be discharged back to the nursing facility as

## 2023-12-02 NOTE — ED NOTES
.Pt discharged at this time. Discharge instructions and medications reviewed,  Questions were answered. PT verbalized understanding. VSS, Afebrile. Follow up appointments were discussed.          Damien Ovalles RN  12/02/23 1640

## 2024-02-14 ENCOUNTER — HOSPITAL ENCOUNTER (EMERGENCY)
Age: 89
Discharge: NURSING, REHAB OR LONG-TERM CARE | DRG: 100 | End: 2024-02-15
Payer: MEDICARE

## 2024-02-14 DIAGNOSIS — R07.9 CHEST PAIN, UNSPECIFIED TYPE: Primary | ICD-10-CM

## 2024-02-14 LAB
ANION GAP SERPL CALCULATED.3IONS-SCNC: 9 MMOL/L (ref 3–16)
BASOPHILS # BLD: 0.1 K/UL (ref 0–0.2)
BASOPHILS NFR BLD: 1.2 %
BUN SERPL-MCNC: 18 MG/DL (ref 7–20)
CALCIUM SERPL-MCNC: 8.8 MG/DL (ref 8.3–10.6)
CHLORIDE SERPL-SCNC: 103 MMOL/L (ref 99–110)
CO2 SERPL-SCNC: 29 MMOL/L (ref 21–32)
CREAT SERPL-MCNC: 0.6 MG/DL (ref 0.6–1.2)
DEPRECATED RDW RBC AUTO: 14.4 % (ref 12.4–15.4)
EOSINOPHIL # BLD: 0.2 K/UL (ref 0–0.6)
EOSINOPHIL NFR BLD: 3.6 %
GFR SERPLBLD CREATININE-BSD FMLA CKD-EPI: >60 ML/MIN/{1.73_M2}
GLUCOSE SERPL-MCNC: 155 MG/DL (ref 70–99)
HCT VFR BLD AUTO: 36.4 % (ref 36–48)
HGB BLD-MCNC: 12.1 G/DL (ref 12–16)
LYMPHOCYTES # BLD: 1.7 K/UL (ref 1–5.1)
LYMPHOCYTES NFR BLD: 33.5 %
MAGNESIUM SERPL-MCNC: 2.2 MG/DL (ref 1.8–2.4)
MCH RBC QN AUTO: 28.4 PG (ref 26–34)
MCHC RBC AUTO-ENTMCNC: 33.4 G/DL (ref 31–36)
MCV RBC AUTO: 85.2 FL (ref 80–100)
MONOCYTES # BLD: 0.5 K/UL (ref 0–1.3)
MONOCYTES NFR BLD: 9 %
NEUTROPHILS # BLD: 2.7 K/UL (ref 1.7–7.7)
NEUTROPHILS NFR BLD: 52.7 %
PLATELET # BLD AUTO: 165 K/UL (ref 135–450)
PMV BLD AUTO: 8.4 FL (ref 5–10.5)
POTASSIUM SERPL-SCNC: 3.5 MMOL/L (ref 3.5–5.1)
RBC # BLD AUTO: 4.27 M/UL (ref 4–5.2)
SODIUM SERPL-SCNC: 141 MMOL/L (ref 136–145)
TROPONIN, HIGH SENSITIVITY: 11 NG/L (ref 0–14)
TROPONIN, HIGH SENSITIVITY: 12 NG/L (ref 0–14)
WBC # BLD AUTO: 5.1 K/UL (ref 4–11)

## 2024-02-14 PROCEDURE — 6370000000 HC RX 637 (ALT 250 FOR IP): Performed by: PHYSICIAN ASSISTANT

## 2024-02-14 PROCEDURE — 85025 COMPLETE CBC W/AUTO DIFF WBC: CPT

## 2024-02-14 PROCEDURE — 93005 ELECTROCARDIOGRAM TRACING: CPT | Performed by: EMERGENCY MEDICINE

## 2024-02-14 PROCEDURE — 84484 ASSAY OF TROPONIN QUANT: CPT

## 2024-02-14 PROCEDURE — 80048 BASIC METABOLIC PNL TOTAL CA: CPT

## 2024-02-14 PROCEDURE — 99284 EMERGENCY DEPT VISIT MOD MDM: CPT

## 2024-02-14 PROCEDURE — 83735 ASSAY OF MAGNESIUM: CPT

## 2024-02-14 RX ORDER — MAGNESIUM HYDROXIDE/ALUMINUM HYDROXICE/SIMETHICONE 120; 1200; 1200 MG/30ML; MG/30ML; MG/30ML
5-10 SUSPENSION ORAL EVERY 6 HOURS PRN
Qty: 355 ML | Refills: 0 | Status: SHIPPED | OUTPATIENT
Start: 2024-02-14 | End: 2024-02-15

## 2024-02-14 RX ADMIN — Medication: at 21:21

## 2024-02-15 ENCOUNTER — HOSPITAL ENCOUNTER (INPATIENT)
Age: 89
LOS: 5 days | Discharge: OTHER INSTITUTION | DRG: 100 | End: 2024-02-20
Attending: EMERGENCY MEDICINE | Admitting: HOSPITALIST
Payer: MEDICARE

## 2024-02-15 ENCOUNTER — APPOINTMENT (OUTPATIENT)
Dept: GENERAL RADIOLOGY | Age: 89
DRG: 100 | End: 2024-02-15
Payer: MEDICARE

## 2024-02-15 ENCOUNTER — APPOINTMENT (OUTPATIENT)
Dept: CT IMAGING | Age: 89
DRG: 100 | End: 2024-02-15
Payer: MEDICARE

## 2024-02-15 VITALS
TEMPERATURE: 97.8 F | WEIGHT: 174.6 LBS | OXYGEN SATURATION: 98 % | HEIGHT: 61 IN | RESPIRATION RATE: 18 BRPM | BODY MASS INDEX: 32.97 KG/M2 | SYSTOLIC BLOOD PRESSURE: 139 MMHG | HEART RATE: 56 BPM | DIASTOLIC BLOOD PRESSURE: 50 MMHG

## 2024-02-15 DIAGNOSIS — R06.6 HICCUPS: ICD-10-CM

## 2024-02-15 DIAGNOSIS — Z71.89 GOALS OF CARE, COUNSELING/DISCUSSION: ICD-10-CM

## 2024-02-15 DIAGNOSIS — R56.9 SEIZURE (HCC): Primary | ICD-10-CM

## 2024-02-15 LAB
ANION GAP SERPL CALCULATED.3IONS-SCNC: 10 MMOL/L (ref 3–16)
BASOPHILS # BLD: 0.1 K/UL (ref 0–0.2)
BASOPHILS NFR BLD: 1.1 %
BUN SERPL-MCNC: 18 MG/DL (ref 7–20)
CALCIUM SERPL-MCNC: 8.7 MG/DL (ref 8.3–10.6)
CHLORIDE SERPL-SCNC: 104 MMOL/L (ref 99–110)
CO2 SERPL-SCNC: 29 MMOL/L (ref 21–32)
CREAT SERPL-MCNC: 0.6 MG/DL (ref 0.6–1.2)
DEPRECATED RDW RBC AUTO: 14.6 % (ref 12.4–15.4)
EKG ATRIAL RATE: 57 BPM
EKG ATRIAL RATE: 57 BPM
EKG DIAGNOSIS: NORMAL
EKG DIAGNOSIS: NORMAL
EKG P AXIS: 36 DEGREES
EKG P AXIS: 66 DEGREES
EKG P-R INTERVAL: 136 MS
EKG P-R INTERVAL: 150 MS
EKG Q-T INTERVAL: 426 MS
EKG Q-T INTERVAL: 438 MS
EKG QRS DURATION: 78 MS
EKG QRS DURATION: 84 MS
EKG QTC CALCULATION (BAZETT): 414 MS
EKG QTC CALCULATION (BAZETT): 426 MS
EKG R AXIS: 1 DEGREES
EKG R AXIS: 7 DEGREES
EKG T AXIS: 35 DEGREES
EKG T AXIS: 46 DEGREES
EKG VENTRICULAR RATE: 57 BPM
EKG VENTRICULAR RATE: 57 BPM
EOSINOPHIL # BLD: 0.2 K/UL (ref 0–0.6)
EOSINOPHIL NFR BLD: 2.8 %
GFR SERPLBLD CREATININE-BSD FMLA CKD-EPI: >60 ML/MIN/{1.73_M2}
GLUCOSE SERPL-MCNC: 87 MG/DL (ref 70–99)
HCT VFR BLD AUTO: 36.2 % (ref 36–48)
HGB BLD-MCNC: 11.9 G/DL (ref 12–16)
LYMPHOCYTES # BLD: 1.7 K/UL (ref 1–5.1)
LYMPHOCYTES NFR BLD: 29.1 %
MCH RBC QN AUTO: 27.5 PG (ref 26–34)
MCHC RBC AUTO-ENTMCNC: 32.8 G/DL (ref 31–36)
MCV RBC AUTO: 83.9 FL (ref 80–100)
MONOCYTES # BLD: 0.5 K/UL (ref 0–1.3)
MONOCYTES NFR BLD: 8.9 %
NEUTROPHILS # BLD: 3.4 K/UL (ref 1.7–7.7)
NEUTROPHILS NFR BLD: 58.1 %
PLATELET # BLD AUTO: 188 K/UL (ref 135–450)
PMV BLD AUTO: 8.4 FL (ref 5–10.5)
POTASSIUM SERPL-SCNC: 4.2 MMOL/L (ref 3.5–5.1)
PROLACTIN SERPL IA-MCNC: 57.5 NG/ML
RBC # BLD AUTO: 4.31 M/UL (ref 4–5.2)
SODIUM SERPL-SCNC: 143 MMOL/L (ref 136–145)
TROPONIN, HIGH SENSITIVITY: 11 NG/L (ref 0–14)
TROPONIN, HIGH SENSITIVITY: 12 NG/L (ref 0–14)
WBC # BLD AUTO: 5.8 K/UL (ref 4–11)

## 2024-02-15 PROCEDURE — 96375 TX/PRO/DX INJ NEW DRUG ADDON: CPT

## 2024-02-15 PROCEDURE — 96372 THER/PROPH/DIAG INJ SC/IM: CPT

## 2024-02-15 PROCEDURE — 99285 EMERGENCY DEPT VISIT HI MDM: CPT

## 2024-02-15 PROCEDURE — 6360000002 HC RX W HCPCS: Performed by: EMERGENCY MEDICINE

## 2024-02-15 PROCEDURE — 70450 CT HEAD/BRAIN W/O DYE: CPT

## 2024-02-15 PROCEDURE — 85025 COMPLETE CBC W/AUTO DIFF WBC: CPT

## 2024-02-15 PROCEDURE — 93005 ELECTROCARDIOGRAM TRACING: CPT | Performed by: EMERGENCY MEDICINE

## 2024-02-15 PROCEDURE — 93010 ELECTROCARDIOGRAM REPORT: CPT | Performed by: INTERNAL MEDICINE

## 2024-02-15 PROCEDURE — 71045 X-RAY EXAM CHEST 1 VIEW: CPT

## 2024-02-15 PROCEDURE — 2580000003 HC RX 258: Performed by: HOSPITALIST

## 2024-02-15 PROCEDURE — 2060000000 HC ICU INTERMEDIATE R&B

## 2024-02-15 PROCEDURE — 96374 THER/PROPH/DIAG INJ IV PUSH: CPT

## 2024-02-15 PROCEDURE — 84484 ASSAY OF TROPONIN QUANT: CPT

## 2024-02-15 PROCEDURE — 84146 ASSAY OF PROLACTIN: CPT

## 2024-02-15 PROCEDURE — 36415 COLL VENOUS BLD VENIPUNCTURE: CPT

## 2024-02-15 PROCEDURE — 6370000000 HC RX 637 (ALT 250 FOR IP): Performed by: EMERGENCY MEDICINE

## 2024-02-15 PROCEDURE — 80048 BASIC METABOLIC PNL TOTAL CA: CPT

## 2024-02-15 RX ORDER — LORAZEPAM 2 MG/ML
4 INJECTION INTRAMUSCULAR
Status: DISCONTINUED | OUTPATIENT
Start: 2024-02-15 | End: 2024-02-15

## 2024-02-15 RX ORDER — LEVETIRACETAM 500 MG/5ML
1500 INJECTION, SOLUTION, CONCENTRATE INTRAVENOUS EVERY 12 HOURS
Status: DISCONTINUED | OUTPATIENT
Start: 2024-02-16 | End: 2024-02-16

## 2024-02-15 RX ORDER — BRIMONIDINE TARTRATE 2 MG/ML
1 SOLUTION/ DROPS OPHTHALMIC DAILY
Status: DISCONTINUED | OUTPATIENT
Start: 2024-02-16 | End: 2024-02-15

## 2024-02-15 RX ORDER — LANOLIN ALCOHOL/MO/W.PET/CERES
1000 CREAM (GRAM) TOPICAL DAILY
COMMUNITY

## 2024-02-15 RX ORDER — ASPIRIN 81 MG/1
81 TABLET ORAL DAILY
Status: DISCONTINUED | OUTPATIENT
Start: 2024-02-16 | End: 2024-02-15

## 2024-02-15 RX ORDER — POTASSIUM CHLORIDE 7.45 MG/ML
10 INJECTION INTRAVENOUS PRN
Status: DISCONTINUED | OUTPATIENT
Start: 2024-02-15 | End: 2024-02-20 | Stop reason: HOSPADM

## 2024-02-15 RX ORDER — SODIUM CHLORIDE 0.9 % (FLUSH) 0.9 %
10 SYRINGE (ML) INJECTION EVERY 12 HOURS SCHEDULED
Status: DISCONTINUED | OUTPATIENT
Start: 2024-02-15 | End: 2024-02-20 | Stop reason: HOSPADM

## 2024-02-15 RX ORDER — SODIUM CHLORIDE, SODIUM LACTATE, POTASSIUM CHLORIDE, CALCIUM CHLORIDE 600; 310; 30; 20 MG/100ML; MG/100ML; MG/100ML; MG/100ML
INJECTION, SOLUTION INTRAVENOUS CONTINUOUS
Status: ACTIVE | OUTPATIENT
Start: 2024-02-15 | End: 2024-02-16

## 2024-02-15 RX ORDER — LEVETIRACETAM 500 MG/5ML
1500 INJECTION, SOLUTION, CONCENTRATE INTRAVENOUS ONCE
Status: COMPLETED | OUTPATIENT
Start: 2024-02-15 | End: 2024-02-15

## 2024-02-15 RX ORDER — ACETAMINOPHEN 325 MG/1
650 TABLET ORAL EVERY 6 HOURS PRN
Status: DISCONTINUED | OUTPATIENT
Start: 2024-02-15 | End: 2024-02-20 | Stop reason: HOSPADM

## 2024-02-15 RX ORDER — LORAZEPAM 2 MG/ML
2 INJECTION INTRAMUSCULAR
Status: ACTIVE | OUTPATIENT
Start: 2024-02-15 | End: 2024-02-16

## 2024-02-15 RX ORDER — CHOLESTYRAMINE LIGHT 4 G/5.7G
4 POWDER, FOR SUSPENSION ORAL DAILY
COMMUNITY

## 2024-02-15 RX ORDER — SERTRALINE HYDROCHLORIDE 25 MG/1
25 TABLET, FILM COATED ORAL DAILY
COMMUNITY

## 2024-02-15 RX ORDER — SODIUM CHLORIDE 9 MG/ML
INJECTION, SOLUTION INTRAVENOUS PRN
Status: DISCONTINUED | OUTPATIENT
Start: 2024-02-15 | End: 2024-02-20 | Stop reason: HOSPADM

## 2024-02-15 RX ORDER — POTASSIUM CHLORIDE 20 MEQ/1
40 TABLET, EXTENDED RELEASE ORAL PRN
Status: DISCONTINUED | OUTPATIENT
Start: 2024-02-15 | End: 2024-02-20 | Stop reason: HOSPADM

## 2024-02-15 RX ORDER — LEVOTHYROXINE SODIUM 0.05 MG/1
50 TABLET ORAL DAILY
Status: DISCONTINUED | OUTPATIENT
Start: 2024-02-16 | End: 2024-02-20 | Stop reason: HOSPADM

## 2024-02-15 RX ORDER — DORZOLAMIDE HCL 20 MG/ML
1 SOLUTION/ DROPS OPHTHALMIC 3 TIMES DAILY
Status: DISCONTINUED | OUTPATIENT
Start: 2024-02-15 | End: 2024-02-20 | Stop reason: HOSPADM

## 2024-02-15 RX ORDER — PANTOPRAZOLE SODIUM 40 MG/1
40 TABLET, DELAYED RELEASE ORAL ONCE
Status: COMPLETED | OUTPATIENT
Start: 2024-02-15 | End: 2024-02-15

## 2024-02-15 RX ORDER — ONDANSETRON 4 MG/1
4 TABLET, ORALLY DISINTEGRATING ORAL EVERY 8 HOURS PRN
Status: DISCONTINUED | OUTPATIENT
Start: 2024-02-15 | End: 2024-02-20 | Stop reason: HOSPADM

## 2024-02-15 RX ORDER — ACETAMINOPHEN 650 MG/1
650 SUPPOSITORY RECTAL EVERY 6 HOURS PRN
Status: DISCONTINUED | OUTPATIENT
Start: 2024-02-15 | End: 2024-02-20 | Stop reason: HOSPADM

## 2024-02-15 RX ORDER — CHLORPROMAZINE HYDROCHLORIDE 25 MG/ML
25 INJECTION INTRAMUSCULAR ONCE
Status: COMPLETED | OUTPATIENT
Start: 2024-02-15 | End: 2024-02-15

## 2024-02-15 RX ORDER — ONDANSETRON 2 MG/ML
4 INJECTION INTRAMUSCULAR; INTRAVENOUS EVERY 6 HOURS PRN
Status: DISCONTINUED | OUTPATIENT
Start: 2024-02-15 | End: 2024-02-20 | Stop reason: HOSPADM

## 2024-02-15 RX ORDER — SODIUM CHLORIDE 0.9 % (FLUSH) 0.9 %
10 SYRINGE (ML) INJECTION PRN
Status: DISCONTINUED | OUTPATIENT
Start: 2024-02-15 | End: 2024-02-20 | Stop reason: HOSPADM

## 2024-02-15 RX ORDER — ENOXAPARIN SODIUM 100 MG/ML
40 INJECTION SUBCUTANEOUS DAILY
Status: DISCONTINUED | OUTPATIENT
Start: 2024-02-16 | End: 2024-02-20 | Stop reason: HOSPADM

## 2024-02-15 RX ORDER — ACETAMINOPHEN 500 MG
1000 TABLET ORAL 3 TIMES DAILY PRN
COMMUNITY

## 2024-02-15 RX ORDER — MAGNESIUM SULFATE IN WATER 40 MG/ML
2000 INJECTION, SOLUTION INTRAVENOUS PRN
Status: DISCONTINUED | OUTPATIENT
Start: 2024-02-15 | End: 2024-02-20 | Stop reason: HOSPADM

## 2024-02-15 RX ORDER — METOCLOPRAMIDE HYDROCHLORIDE 5 MG/ML
10 INJECTION INTRAMUSCULAR; INTRAVENOUS ONCE
Status: COMPLETED | OUTPATIENT
Start: 2024-02-15 | End: 2024-02-15

## 2024-02-15 RX ORDER — BRIMONIDINE TARTRATE 2 MG/ML
1 SOLUTION/ DROPS OPHTHALMIC 3 TIMES DAILY
Status: DISCONTINUED | OUTPATIENT
Start: 2024-02-15 | End: 2024-02-20 | Stop reason: HOSPADM

## 2024-02-15 RX ORDER — SERTRALINE HYDROCHLORIDE 25 MG/1
25 TABLET, FILM COATED ORAL DAILY
Status: DISCONTINUED | OUTPATIENT
Start: 2024-02-16 | End: 2024-02-20 | Stop reason: HOSPADM

## 2024-02-15 RX ORDER — METOCLOPRAMIDE 10 MG/1
10 TABLET ORAL 3 TIMES DAILY
Qty: 30 TABLET | Refills: 0 | Status: SHIPPED | OUTPATIENT
Start: 2024-02-15 | End: 2024-02-25

## 2024-02-15 RX ORDER — PANTOPRAZOLE SODIUM 40 MG/1
40 TABLET, DELAYED RELEASE ORAL
Status: DISCONTINUED | OUTPATIENT
Start: 2024-02-16 | End: 2024-02-20 | Stop reason: HOSPADM

## 2024-02-15 RX ORDER — PANTOPRAZOLE SODIUM 40 MG/1
40 TABLET, DELAYED RELEASE ORAL
Status: DISCONTINUED | OUTPATIENT
Start: 2024-02-16 | End: 2024-02-15

## 2024-02-15 RX ORDER — LEVOTHYROXINE SODIUM 0.05 MG/1
50 TABLET ORAL DAILY
COMMUNITY

## 2024-02-15 RX ORDER — POLYETHYLENE GLYCOL 3350 17 G/17G
17 POWDER, FOR SOLUTION ORAL DAILY PRN
Status: DISCONTINUED | OUTPATIENT
Start: 2024-02-15 | End: 2024-02-20 | Stop reason: HOSPADM

## 2024-02-15 RX ORDER — ATORVASTATIN CALCIUM 40 MG/1
40 TABLET, FILM COATED ORAL DAILY
Status: DISCONTINUED | OUTPATIENT
Start: 2024-02-16 | End: 2024-02-20 | Stop reason: HOSPADM

## 2024-02-15 RX ADMIN — CHLORPROMAZINE HYDROCHLORIDE 25 MG: 25 INJECTION INTRAMUSCULAR at 13:53

## 2024-02-15 RX ADMIN — SODIUM CHLORIDE, PRESERVATIVE FREE 10 ML: 5 INJECTION INTRAVENOUS at 23:09

## 2024-02-15 RX ADMIN — PANTOPRAZOLE SODIUM 40 MG: 40 TABLET, DELAYED RELEASE ORAL at 15:34

## 2024-02-15 RX ADMIN — SODIUM CHLORIDE, POTASSIUM CHLORIDE, SODIUM LACTATE AND CALCIUM CHLORIDE: 600; 310; 30; 20 INJECTION, SOLUTION INTRAVENOUS at 23:09

## 2024-02-15 RX ADMIN — LEVETIRACETAM 1500 MG: 100 INJECTION, SOLUTION INTRAVENOUS at 18:07

## 2024-02-15 RX ADMIN — METOCLOPRAMIDE 10 MG: 5 INJECTION, SOLUTION INTRAMUSCULAR; INTRAVENOUS at 15:34

## 2024-02-15 ASSESSMENT — PAIN SCALES - GENERAL: PAINLEVEL_OUTOF10: 0

## 2024-02-15 ASSESSMENT — PAIN - FUNCTIONAL ASSESSMENT: PAIN_FUNCTIONAL_ASSESSMENT: 0-10

## 2024-02-15 NOTE — ED PROVIDER NOTES
**ADVANCED PRACTICE PROVIDER, I HAVE EVALUATED THIS PATIENT**        [facility] EMERGENCY DEPARTMENT  EMERGENCY DEPARTMENT ENCOUNTER      Pt Name: Modesta Crockett  MRN:[id number]  Birthdate 1935  Date of evaluation: 2/14/2024  Provider: Je Dennis PA-C  Note Started: 11:01 PM EST 2/14/24        Chief Complaint:    Chief Complaint   Patient presents with    Chest Pain    Shortness of Breath         Nursing Notes, Past Medical Hx, Past Surgical Hx, Social Hx, Allergies, and Family Hx were all reviewed and agreed with or any disagreements were addressed in the HPI.    HPI: (Location, Duration, Timing, Severity, Quality, Assoc Sx, Context, Modifying factors)    History From: EMS and nursing facility and patient's daughter now at bedside          Chief Complaint of pain in chest    This is a  88 y.o. female who presents and reportedly after dinner was having hiccups and when she would have a hiccup she would make a funny look and take a quick breath.  This has continued intermittently.  However patient is denying any pain in her chest or any difficulty breathing.  No recent fall or infection or fevers.  No complaint of belly pain or extremity acute changes.    PastMedical/Surgical History:      Diagnosis Date    Acute cystitis with hematuria     Acute cystitis without hematuria     Acute metabolic encephalopathy     Adhesive capsulitis of unspecified shoulder     Alkaline phosphatase elevation     Alopecia hereditaria     Basal cell carcinoma (BCC) of face     Cancer (HCC)     SKIN    Chondrodermatitis nodularis chronica helicis     Chondromalacia     Chronic idiopathic constipation     Chronic neck pain     Closed left hip fracture (HCC)     Continuous leakage of urine     Esophagitis     Facet arthropathy, cervical     Glaucoma     Primary open angled glaucoma    Hypercholesterolemia     Hyperlipidemia     Hypertension     Hyponatremia     Hypothyroidism     IBS (irritable bowel syndrome)

## 2024-02-15 NOTE — ED PROVIDER NOTES
[facility] EMERGENCY DEPARTMENT    This patient was initially evaluated by Dr. Urban. Briefly, 88 y.o. female presented with hiccups.  She was treated by the off going provider with Thorazine, Reglan, and Protonix.  She had labs that included a renal panel that showed no significant electrolyte abnormality, CBC that showed a trivial anemia and no leukocytosis, and a glucose that was reassuring.  Her troponins were within normal limits.  Ultimately, the patient was discharged and was awaiting transportation back to her [facility] care facility at change of shift.    I was called to the patient's room shortly before 6 PM at which time she was noted to have seizure-like activity.  Nursing noted the patient having fine tremors of the bilateral upper extremities.  I did not witness the seizure-like activity but did note the patient to be postictal.  She was cyanotic in appearance upon my arrival.  She had a strong carotid pulse.  She was placed on a nonrebreather and a jaw thrust was performed.  Her cyanosis resolved.  She was readily weaned to a nasal cannula.  I will obtain a head CT.  Patient will be administered 1.5 g of Keppra IV.  I anticipate admitting the patient to hospital medicine for further evaluation and treatment. I discussed the patient's case with her son, Duglas Crockett, who agrees with the plan of care.  I reviewed the patient's head CT that shows no acute hemorrhage or mass to my independent interpretation.  Radiology reads no acute intracranial abnormality. Case d/w Dr. Nunez via [facility] - admit to .     Extended Emergency Contact Information  Primary Emergency Contact: Saul Crockett  Address: [address] [place] [address] [facility]  Home Phone: [phone]  Mobile Phone: [phone]  Relation: Spouse  Secondary Emergency Contact: Duglas Crockett   [facility]  Home Phone: [phone]  Relation: Child     I, Hiren Hall MD, am 
    On presentation she is awake and alert.  She is able to tell me her name and date of birth though this is with multiple repeated questioning.  She does have hiccups noted intermittently.  When she does have a hiccup her arms do go up at the same time bilaterally.  She does not appear to be in any acute medical or respiratory distress.  Abdomen is benign.  Lungs are clear bilaterally.    A peripheral IV was placed, labs were ordered along with an EKG and a chest x-ray.  I did also order her some chlorpromazine for her hiccups.  I did speak with the daughter over the phone who is going to get in touch with her brother to call here to see if we can change her CODE STATUS to a DNR which is what the family now wants.    EKG with no acute ischemic changes.  It in fact looks better than her prior EKG from 2019.  Labs once again are reassuring including negative troponin x 2.  She does have anemia with a hemoglobin of 11.9 though this is similar to her baseline of 12.    Chest x-ray imaging clear, radiology agrees.    On reassessment it appears that her hiccups have decreased though they still continue.  She was initially ordered Thorazine.  She was then ordered a dose of Reglan.  Despite the hiccups she was able to eat and drink and after passing the swallow test she got a teaspoon of sugar which is recommended to see if this can potentially stop as a physical maneuver her hiccups.  Next we tried Reglan 10 mg along with protonix.  No significant change, she continued to have some hiccups.    Will call to result the workup and recommendations with nursing home staff.  They were also be put in her discharge paperwork.         Disposition Considerations (tests considered but not done, Shared Decision Making, Pt Expectation of Test or Tx.): Appropriate for outpatient management      I estimate there is low risk for sepsis, MI, stroke, tamponade, PTX, toxicity, or other life threatening etiology. Given the best available

## 2024-02-15 NOTE — ED NOTES
Pt about to be d/c to SNF. At this time pt began seizure like activity. Pt was rigid and arms were twitching, mouth was twitching, and eyes twitching. Pt became hypoxic during this time % applied to assist. Pulse ox came up to 100% and MD Rafael at bedside. Pt stopped seizing at this time and oxygen requirement decreased. Pt became snoring and urinated on herself. Pt to CT with RUPERT Augustine. Seizure precautions initiated.

## 2024-02-15 NOTE — DISCHARGE INSTRUCTIONS
It is unclear what is causing Ms. Byers's hiccups. We gave her medication in the ED to see if it would help and it did seem to slow them down. She can continue taking the reglan 10-20mg TID x 10 days.  You can also try having her swallow a teaspoon of sugar, sipping ice water, drinking water quickly.  She was able to eat and drink here without any issues.  Sometimes starting a proton pump inhibitor can be helpful.    We did do another cardiac workup for her and it was again reassuring.  This included blood tests, EKG, and a chest x-ray.  She is anemic though this is not new and she appears to be around her baseline of 12.      Discussed with her daughter Steph. Also called and spoke with son, they want patient to be DNR-CCA. This was updated here.

## 2024-02-15 NOTE — ED TRIAGE NOTES
Patient was brought in by EMS from [facility] due to hiccups. JeannetteBERT states that she is concerned for possible choking or seizure. Patient was discharged from this ED last night for chest pain.

## 2024-02-15 NOTE — ED NOTES
DNR-CCA completed and signed by Dr. Urban and this RN after conversation with son, Noé Crockett, via telephone conversation.

## 2024-02-15 NOTE — DISCHARGE INSTRUCTIONS
Home in stable condition to use Maalox when needed.  Drink plenty of fluids, and follow-up outpatient with your family doctor for further care and treatment.  Return to the ER for any emergency worsening or concern

## 2024-02-16 ENCOUNTER — APPOINTMENT (OUTPATIENT)
Dept: MRI IMAGING | Age: 89
DRG: 100 | End: 2024-02-16
Payer: MEDICARE

## 2024-02-16 LAB
ALBUMIN SERPL-MCNC: 3.8 G/DL (ref 3.4–5)
ALBUMIN/GLOB SERPL: 1.4 {RATIO} (ref 1.1–2.2)
ALP SERPL-CCNC: 157 U/L (ref 40–129)
ALT SERPL-CCNC: 12 U/L (ref 10–40)
ANION GAP SERPL CALCULATED.3IONS-SCNC: 13 MMOL/L (ref 3–16)
AST SERPL-CCNC: 21 U/L (ref 15–37)
BACTERIA URNS QL MICRO: ABNORMAL /HPF
BASOPHILS # BLD: 0.1 K/UL (ref 0–0.2)
BASOPHILS NFR BLD: 0.9 %
BILIRUB SERPL-MCNC: 0.6 MG/DL (ref 0–1)
BILIRUB UR QL STRIP.AUTO: NEGATIVE
BUN SERPL-MCNC: 11 MG/DL (ref 7–20)
CALCIUM SERPL-MCNC: 8.5 MG/DL (ref 8.3–10.6)
CHLORIDE SERPL-SCNC: 103 MMOL/L (ref 99–110)
CLARITY UR: ABNORMAL
CO2 SERPL-SCNC: 23 MMOL/L (ref 21–32)
COLOR UR: YELLOW
CREAT SERPL-MCNC: <0.5 MG/DL (ref 0.6–1.2)
DEPRECATED RDW RBC AUTO: 14.7 % (ref 12.4–15.4)
EOSINOPHIL # BLD: 0.1 K/UL (ref 0–0.6)
EOSINOPHIL NFR BLD: 1.3 %
EPI CELLS #/AREA URNS AUTO: 2 /HPF (ref 0–5)
GFR SERPLBLD CREATININE-BSD FMLA CKD-EPI: >60 ML/MIN/{1.73_M2}
GLUCOSE BLD-MCNC: 74 MG/DL (ref 70–99)
GLUCOSE BLD-MCNC: 86 MG/DL (ref 70–99)
GLUCOSE BLD-MCNC: 87 MG/DL (ref 70–99)
GLUCOSE BLD-MCNC: 96 MG/DL (ref 70–99)
GLUCOSE SERPL-MCNC: 89 MG/DL (ref 70–99)
GLUCOSE UR STRIP.AUTO-MCNC: NEGATIVE MG/DL
HCT VFR BLD AUTO: 36.4 % (ref 36–48)
HGB BLD-MCNC: 12.3 G/DL (ref 12–16)
HGB UR QL STRIP.AUTO: NEGATIVE
HYALINE CASTS #/AREA URNS AUTO: 0 /LPF (ref 0–8)
KETONES UR STRIP.AUTO-MCNC: NEGATIVE MG/DL
LEUKOCYTE ESTERASE UR QL STRIP.AUTO: NEGATIVE
LYMPHOCYTES # BLD: 1.4 K/UL (ref 1–5.1)
LYMPHOCYTES NFR BLD: 20.5 %
MCH RBC QN AUTO: 27.9 PG (ref 26–34)
MCHC RBC AUTO-ENTMCNC: 33.8 G/DL (ref 31–36)
MCV RBC AUTO: 82.7 FL (ref 80–100)
MONOCYTES # BLD: 0.6 K/UL (ref 0–1.3)
MONOCYTES NFR BLD: 8.6 %
NEUTROPHILS # BLD: 4.5 K/UL (ref 1.7–7.7)
NEUTROPHILS NFR BLD: 68.7 %
NITRITE UR QL STRIP.AUTO: POSITIVE
PERFORMED ON: NORMAL
PH UR STRIP.AUTO: 8 [PH] (ref 5–8)
PHOSPHATE SERPL-MCNC: 3.2 MG/DL (ref 2.5–4.9)
PLATELET # BLD AUTO: 168 K/UL (ref 135–450)
PMV BLD AUTO: 8.3 FL (ref 5–10.5)
POTASSIUM SERPL-SCNC: 3.9 MMOL/L (ref 3.5–5.1)
PROT SERPL-MCNC: 6.6 G/DL (ref 6.4–8.2)
PROT UR STRIP.AUTO-MCNC: NEGATIVE MG/DL
RBC # BLD AUTO: 4.39 M/UL (ref 4–5.2)
RBC CLUMPS #/AREA URNS AUTO: 1 /HPF (ref 0–4)
SODIUM SERPL-SCNC: 139 MMOL/L (ref 136–145)
SP GR UR STRIP.AUTO: 1.01 (ref 1–1.03)
UA COMPLETE W REFLEX CULTURE PNL UR: ABNORMAL
UA DIPSTICK W REFLEX MICRO PNL UR: YES
URN SPEC COLLECT METH UR: ABNORMAL
UROBILINOGEN UR STRIP-ACNC: 0.2 E.U./DL
WBC # BLD AUTO: 6.6 K/UL (ref 4–11)
WBC #/AREA URNS AUTO: 1 /HPF (ref 0–5)

## 2024-02-16 PROCEDURE — A9579 GAD-BASE MR CONTRAST NOS,1ML: HCPCS | Performed by: NURSE PRACTITIONER

## 2024-02-16 PROCEDURE — 6360000004 HC RX CONTRAST MEDICATION: Performed by: NURSE PRACTITIONER

## 2024-02-16 PROCEDURE — 6360000002 HC RX W HCPCS: Performed by: HOSPITALIST

## 2024-02-16 PROCEDURE — 6370000000 HC RX 637 (ALT 250 FOR IP): Performed by: HOSPITALIST

## 2024-02-16 PROCEDURE — 80053 COMPREHEN METABOLIC PANEL: CPT

## 2024-02-16 PROCEDURE — 70553 MRI BRAIN STEM W/O & W/DYE: CPT

## 2024-02-16 PROCEDURE — 85025 COMPLETE CBC W/AUTO DIFF WBC: CPT

## 2024-02-16 PROCEDURE — 95819 EEG AWAKE AND ASLEEP: CPT

## 2024-02-16 PROCEDURE — 2060000000 HC ICU INTERMEDIATE R&B

## 2024-02-16 PROCEDURE — 36415 COLL VENOUS BLD VENIPUNCTURE: CPT

## 2024-02-16 PROCEDURE — 81001 URINALYSIS AUTO W/SCOPE: CPT

## 2024-02-16 PROCEDURE — 2580000003 HC RX 258: Performed by: HOSPITALIST

## 2024-02-16 PROCEDURE — 84100 ASSAY OF PHOSPHORUS: CPT

## 2024-02-16 RX ORDER — LEVETIRACETAM 500 MG/1
250 TABLET ORAL 2 TIMES DAILY
Status: DISCONTINUED | OUTPATIENT
Start: 2024-02-16 | End: 2024-02-20 | Stop reason: HOSPADM

## 2024-02-16 RX ORDER — LEVETIRACETAM 500 MG/1
500 TABLET ORAL 2 TIMES DAILY
Status: DISCONTINUED | OUTPATIENT
Start: 2024-02-16 | End: 2024-02-16

## 2024-02-16 RX ORDER — LEVETIRACETAM 500 MG/5ML
500 INJECTION, SOLUTION, CONCENTRATE INTRAVENOUS EVERY 12 HOURS
Status: DISCONTINUED | OUTPATIENT
Start: 2024-02-16 | End: 2024-02-16

## 2024-02-16 RX ADMIN — ENOXAPARIN SODIUM 40 MG: 100 INJECTION SUBCUTANEOUS at 09:17

## 2024-02-16 RX ADMIN — BRIMONIDINE TARTRATE 1 DROP: 2 SOLUTION OPHTHALMIC at 09:16

## 2024-02-16 RX ADMIN — DORZOLAMIDE HYDROCHLORIDE 1 DROP: 20 SOLUTION/ DROPS OPHTHALMIC at 09:17

## 2024-02-16 RX ADMIN — SODIUM CHLORIDE, PRESERVATIVE FREE 10 ML: 5 INJECTION INTRAVENOUS at 20:25

## 2024-02-16 RX ADMIN — LEVOTHYROXINE SODIUM 50 MCG: 0.05 TABLET ORAL at 06:34

## 2024-02-16 RX ADMIN — BRIMONIDINE TARTRATE 1 DROP: 2 SOLUTION OPHTHALMIC at 21:41

## 2024-02-16 RX ADMIN — GADOTERIDOL 15 ML: 279.3 INJECTION, SOLUTION INTRAVENOUS at 14:24

## 2024-02-16 RX ADMIN — SODIUM CHLORIDE, POTASSIUM CHLORIDE, SODIUM LACTATE AND CALCIUM CHLORIDE: 600; 310; 30; 20 INJECTION, SOLUTION INTRAVENOUS at 12:50

## 2024-02-16 RX ADMIN — LEVETIRACETAM 1500 MG: 100 INJECTION, SOLUTION INTRAVENOUS at 06:41

## 2024-02-16 RX ADMIN — DORZOLAMIDE HYDROCHLORIDE 1 DROP: 20 SOLUTION/ DROPS OPHTHALMIC at 21:41

## 2024-02-16 RX ADMIN — SODIUM CHLORIDE, PRESERVATIVE FREE 10 ML: 5 INJECTION INTRAVENOUS at 09:20

## 2024-02-16 NOTE — ED NOTES
Report called to Rachele at [facility] to let them know that the patient is being admitted.

## 2024-02-16 NOTE — H&P
arthropathy, cervical     Glaucoma     Primary open angled glaucoma    Hypercholesterolemia     Hyperlipidemia     Hypertension     Hyponatremia     Hypothyroidism     IBS (irritable bowel syndrome)     Nocturia     Nonmelanoma skin cancer     Osteoarthritis     Osteoarthritis     Overactive bladder     Recurrent UTI     Seasonal allergies     Senile purpura (HCC)     Sepsis (HCC)     Shoulder pain     Spinal stenosis     Thyroid disease     Urge incontinence     Urinary frequency     Vaginal atrophy     Vitamin D deficiency     Weight loss         Past Surgical History     Past Surgical History:   Procedure Laterality Date    COLONOSCOPY  12/04/2018    Silver-normal    CYST REMOVAL      EYE SURGERY      CATARACT    HIP PINNING Left 4/16/2019    LEFT HIP PINNING performed by Savanah Ardon MD at [facility] OR    MO COLONOSCOPY FLX DX W/COLLJ SPEC WHEN PFRMD N/A 12/4/2018    COLONOSCOPY DIAGNOSTIC OR SCREENING performed by Oliver Silver MD at [facility] ENDOSCOPY    UPPER GASTROINTESTINAL ENDOSCOPY  10/18/2016    Dr Silver        Medications Prior to Admission     Prior to Admission medications    Medication Sig Start Date End Date Taking? Authorizing Provider   metoclopramide (REGLAN) 10 MG tablet Take 1 tablet by mouth in the morning, at noon, and at bedtime for 10 days 2/15/24 2/25/24 Yes Lima Urban MD   cholestyramine light 4 g packet Take 1 packet by mouth daily   Yes Provider, MD Renzo   levothyroxine (SYNTHROID) 50 MCG tablet Take 1 tablet by mouth Daily   Yes Provider, Historical, MD   sertraline (ZOLOFT) 25 MG tablet Take 1 tablet by mouth daily Take with sertraline 50mg tab (total 75mg)   Yes Provider, MD Renzo   sertraline (ZOLOFT) 50 MG tablet Take 1 tablet by mouth daily Take with sertraline 25mg (75mg total)   Yes Provider, MD Renzo   vitamin B-12 (CYANOCOBALAMIN) 1000 MCG tablet Take 1 tablet by mouth daily   Yes Provider, MD Renzo   acetaminophen (TYLENOL) 500 MG tablet Take 2

## 2024-02-16 NOTE — CARE COORDINATION
Case Management Assessment  Initial Evaluation    Date/Time of Evaluation: 2/16/2024 10:33 AM  Assessment Completed by: Hilary Treadwell RN    If patient is discharged prior to next notation, then this note serves as note for discharge by case management.    Patient Name: Modesta Crockett                   YOB: 1935  Diagnosis: Hiccups [R06.6]  Seizure (HCC) [R56.9]  Goals of care, counseling/discussion [Z71.89]  New onset seizure (HCC) [R56.9]                   Date / Time: 2/15/2024 12:47 PM    Patient Admission Status: Inpatient   Readmission Risk (Low < 19, Mod (19-27), High > 27): Readmission Risk Score: 13    Current PCP: Ania Cortez, DO  PCP verified by CM? Yes    Chart Reviewed: Yes      History Provided by: Child/Family  Patient Orientation: Person    Patient Cognition: Other (see comment) (patient sleeping)    Hospitalization in the last 30 days (Readmission):  No    If yes, Readmission Assessment in  Navigator will be completed.    Advance Directives:      Code Status: DNR-CCA   Patient's Primary Decision Maker is: Legal Next of Kin    Primary Decision Maker: Noé Crockett  Child - [phone]    Secondary Decision Maker: Ansley Crockett - Child - [phone]    Discharge Planning:    Patient lives with: Other (Comment) (assisted living (AL)) Type of Home: Assisted living  Primary Care Giver: Other (Comment) (staff at [facility])  Patient Support Systems include: Children, Family Members, Other (Comment) (staff at [facility])   Current Financial resources: Medicare  Current community resources: Assisted Living  Current services prior to admission: Durable Medical Equipment, Other (Comment) (AL at [facility] Memory [facility])            Current DME: Wheelchair, Walker            Type of Home Care services:  Aide Services (round the clock care at [facility])    ADLS  Prior functional level: Assistance with the following:, Bathing, Mobility, Cooking, Housework, Shopping  Current functional level: Assistance with

## 2024-02-16 NOTE — PROCEDURES
[facility]           [address] [place] [address]                          ELECTROENCEPHALOGRAM REPORT    PATIENT NAME: AR MADISON                     :        1935  MED REC NO:   [id number]                          ROOM:  ACCOUNT NO:   [id number]                           ADMIT DATE: 02/15/2024  PROVIDER:     Lane Bolaños DO    DATE OF EE2024    REFERRING PROVIDER:  Melyssa Saldana MD    REASON FOR STUDY:  New-onset seizure.    BRIEF HISTORY AND NEUROLOGIC FINDINGS:  The patient is an 88-year-old  female being evaluated for reported seizure.    MEDICATIONS:  The patient's centrally-acting medications listed include  Ativan, Zoloft, Keppra, and Synthroid.    EEG FINDINGS: This is a 20-channel digital EEG performed utilizing  bipolar and referential montages.  Wakefulness, drowsiness, and sleep  were obtained during the recording.  During maximum wakefulness, there  was a moderate voltage, symmetric, disorganized though reactive 6-7 Hz  posterior background rhythm.  The anterior background consists of low to  moderate voltage mixed frequencies.  Superimposed slower theta and  occasional delta slowing was also noted during the recording.   Drowsiness is manifested by attenuation of the waking background  rhythms.  Sleep was manifested by sleep spindles and K-complexes.    Occasional generalized spike wave discharges were noted during the  recording.  These often had a maximum in the left central parasagittal  region, though at other times in the right central parasagittal region  as well suggesting that this was likely coming from near the midline.   No definite seizures were recorded.    Photic stimulation was performed at various flash frequencies and did  not evoke any significant posterior driving response.  Hyperventilation  exercise was not performed due to the patient's age.    A 1-channel EKG rhythm strip was reviewed and

## 2024-02-16 NOTE — ACP (ADVANCE CARE PLANNING)
Advance Care Planning     Advance Care Planning Activator (Inpatient)  Conversation Note      Date of ACP Conversation: 2/16/2024     Conversation Conducted with:  Healthcare Decision Maker: Next of Kin by law (only applies in absence of above) (name) daughter Ansley    ACP Activator: Hilary Treadwell RN    Health Care Decision Maker:     Current Designated Health Care Decision Maker:     Primary Decision Maker: Noé Crockett - Child - [phone]    Secondary Decision Maker: Ansley Crockett - Child - [phone]    Care Preferences    Ventilation:  \"If you were in your present state of health and suddenly became very ill and were unable to breathe on your own, what would your preference be about the use of a ventilator (breathing machine) if it were available to you?\"      Would the patient desire the use of ventilator (breathing machine)?: no    \"If your health worsens and it becomes clear that your chance of recovery is unlikely, what would your preference be about the use of a ventilator (breathing machine) if it were available to you?\"     Would the patient desire the use of ventilator (breathing machine)?: No      Resuscitation  \"CPR works best to restart the heart when there is a sudden event, like a heart attack, in someone who is otherwise healthy. Unfortunately, CPR does not typically restart the heart for people who have serious health conditions or who are very sick.\"    \"In the event your heart stopped as a result of an underlying serious health condition, would you want attempts to be made to restart your heart (answer \"yes\" for attempt to resuscitate) or would you prefer a natural death (answer \"no\" for do not attempt to resuscitate)?\" no       [] Yes   [x] No   Educated Patient / Decision Maker regarding differences between Advance Directives and portable DNR orders.    Length of ACP Conversation in minutes:  5 minutes    Conversation Outcomes:  ACP discussion completed    Follow-up plan:    []

## 2024-02-16 NOTE — CONSULTS
Neurology Consult Note  Reason for Consult: new onset seizure    Chief complaint: difficult to obtain from the patient at this time    Nick Macdonald MD asked me to see Modesta Crockett in consultation for evaluation of new onset seizure    History of Present Illness:  Modesta Crockett is a 88 y.o. female who presents with hiccups.      I obtained my information via chart review.  The patient is unable to provide any information at this time.     It is my understanding that the patient resides in a memory care unit.  She apparently was having hiccups and was shaking/gasping so she was brought to the ED yesterday in order to be evaluated.     It appears she was being prepared for discharge when she had a seizure like episodes.  Per RN note, the patient was rigid w/ twitching in her mouth, eyes, and arms.  He was cyanotic.  Unclear how long this lasted for.  She was incontinent of urine and snoring afterwards.  She was administered 1500 mg of Keppra.      Currently she is very drowsy.  RN says that she was awake all night essentially.  No further seizure activity has been observed.    I am not aware of any previous seizure history.      Medical History:  Past Medical History:   Diagnosis Date    Acute cystitis with hematuria     Acute cystitis without hematuria     Acute metabolic encephalopathy     Adhesive capsulitis of unspecified shoulder     Alkaline phosphatase elevation     Alopecia hereditaria     Basal cell carcinoma (BCC) of face     Cancer (HCC)     SKIN    Chondrodermatitis nodularis chronica helicis     Chondromalacia     Chronic idiopathic constipation     Chronic neck pain     Closed left hip fracture (HCC)     Continuous leakage of urine     Esophagitis     Facet arthropathy, cervical     Glaucoma     Primary open angled glaucoma    Hypercholesterolemia     Hyperlipidemia     Hypertension     Hyponatremia     Hypothyroidism     IBS (irritable bowel syndrome)     Nocturia     Nonmelanoma skin cancer

## 2024-02-17 LAB
GLUCOSE BLD-MCNC: 146 MG/DL (ref 70–99)
GLUCOSE BLD-MCNC: 152 MG/DL (ref 70–99)
GLUCOSE BLD-MCNC: 69 MG/DL (ref 70–99)
GLUCOSE BLD-MCNC: 81 MG/DL (ref 70–99)
GLUCOSE BLD-MCNC: 86 MG/DL (ref 70–99)
GLUCOSE BLD-MCNC: 92 MG/DL (ref 70–99)
PERFORMED ON: ABNORMAL
PERFORMED ON: NORMAL

## 2024-02-17 PROCEDURE — 2580000003 HC RX 258: Performed by: HOSPITALIST

## 2024-02-17 PROCEDURE — 2060000000 HC ICU INTERMEDIATE R&B

## 2024-02-17 PROCEDURE — 6360000002 HC RX W HCPCS: Performed by: INTERNAL MEDICINE

## 2024-02-17 PROCEDURE — 2580000003 HC RX 258: Performed by: INTERNAL MEDICINE

## 2024-02-17 PROCEDURE — 94760 N-INVAS EAR/PLS OXIMETRY 1: CPT

## 2024-02-17 PROCEDURE — 6360000002 HC RX W HCPCS: Performed by: HOSPITALIST

## 2024-02-17 RX ORDER — LEVETIRACETAM 500 MG/5ML
250 INJECTION, SOLUTION, CONCENTRATE INTRAVENOUS EVERY 12 HOURS
Status: DISCONTINUED | OUTPATIENT
Start: 2024-02-17 | End: 2024-02-18

## 2024-02-17 RX ORDER — DEXTROSE, SODIUM CHLORIDE, SODIUM LACTATE, POTASSIUM CHLORIDE, AND CALCIUM CHLORIDE 5; .6; .31; .03; .02 G/100ML; G/100ML; G/100ML; G/100ML; G/100ML
INJECTION, SOLUTION INTRAVENOUS CONTINUOUS
Status: DISCONTINUED | OUTPATIENT
Start: 2024-02-17 | End: 2024-02-20

## 2024-02-17 RX ADMIN — SODIUM CHLORIDE, SODIUM LACTATE, POTASSIUM CHLORIDE, CALCIUM CHLORIDE AND DEXTROSE MONOHYDRATE: 5; 600; 310; 30; 20 INJECTION, SOLUTION INTRAVENOUS at 18:34

## 2024-02-17 RX ADMIN — ENOXAPARIN SODIUM 40 MG: 100 INJECTION SUBCUTANEOUS at 08:10

## 2024-02-17 RX ADMIN — BRIMONIDINE TARTRATE 1 DROP: 2 SOLUTION OPHTHALMIC at 21:50

## 2024-02-17 RX ADMIN — DORZOLAMIDE HYDROCHLORIDE 1 DROP: 20 SOLUTION/ DROPS OPHTHALMIC at 13:27

## 2024-02-17 RX ADMIN — BRIMONIDINE TARTRATE 1 DROP: 2 SOLUTION OPHTHALMIC at 13:27

## 2024-02-17 RX ADMIN — SODIUM CHLORIDE, PRESERVATIVE FREE 10 ML: 5 INJECTION INTRAVENOUS at 08:53

## 2024-02-17 RX ADMIN — SODIUM CHLORIDE, PRESERVATIVE FREE 10 ML: 5 INJECTION INTRAVENOUS at 21:50

## 2024-02-17 RX ADMIN — LEVETIRACETAM 250 MG: 100 INJECTION, SOLUTION INTRAVENOUS at 09:56

## 2024-02-17 RX ADMIN — SODIUM CHLORIDE, SODIUM LACTATE, POTASSIUM CHLORIDE, CALCIUM CHLORIDE AND DEXTROSE MONOHYDRATE: 5; 600; 310; 30; 20 INJECTION, SOLUTION INTRAVENOUS at 08:52

## 2024-02-17 RX ADMIN — DORZOLAMIDE HYDROCHLORIDE 1 DROP: 20 SOLUTION/ DROPS OPHTHALMIC at 21:43

## 2024-02-17 RX ADMIN — BRIMONIDINE TARTRATE 1 DROP: 2 SOLUTION OPHTHALMIC at 08:54

## 2024-02-17 RX ADMIN — DORZOLAMIDE HYDROCHLORIDE 1 DROP: 20 SOLUTION/ DROPS OPHTHALMIC at 08:54

## 2024-02-17 RX ADMIN — LEVETIRACETAM 250 MG: 100 INJECTION, SOLUTION INTRAVENOUS at 21:44

## 2024-02-17 ASSESSMENT — PAIN SCALES - GENERAL
PAINLEVEL_OUTOF10: 0
PAINLEVEL_OUTOF10: 0

## 2024-02-18 LAB
GLUCOSE BLD-MCNC: 106 MG/DL (ref 70–99)
GLUCOSE BLD-MCNC: 109 MG/DL (ref 70–99)
GLUCOSE BLD-MCNC: 113 MG/DL (ref 70–99)
GLUCOSE BLD-MCNC: 138 MG/DL (ref 70–99)
GLUCOSE BLD-MCNC: 169 MG/DL (ref 70–99)
PERFORMED ON: ABNORMAL

## 2024-02-18 PROCEDURE — 6370000000 HC RX 637 (ALT 250 FOR IP): Performed by: INTERNAL MEDICINE

## 2024-02-18 PROCEDURE — 97530 THERAPEUTIC ACTIVITIES: CPT

## 2024-02-18 PROCEDURE — 6370000000 HC RX 637 (ALT 250 FOR IP): Performed by: HOSPITALIST

## 2024-02-18 PROCEDURE — 2580000003 HC RX 258: Performed by: INTERNAL MEDICINE

## 2024-02-18 PROCEDURE — 6370000000 HC RX 637 (ALT 250 FOR IP): Performed by: NURSE PRACTITIONER

## 2024-02-18 PROCEDURE — 6360000002 HC RX W HCPCS: Performed by: HOSPITALIST

## 2024-02-18 PROCEDURE — 97161 PT EVAL LOW COMPLEX 20 MIN: CPT

## 2024-02-18 PROCEDURE — 2580000003 HC RX 258: Performed by: HOSPITALIST

## 2024-02-18 PROCEDURE — 6360000002 HC RX W HCPCS: Performed by: INTERNAL MEDICINE

## 2024-02-18 PROCEDURE — 94760 N-INVAS EAR/PLS OXIMETRY 1: CPT

## 2024-02-18 PROCEDURE — 2060000000 HC ICU INTERMEDIATE R&B

## 2024-02-18 RX ORDER — CIPROFLOXACIN 2 MG/ML
400 INJECTION, SOLUTION INTRAVENOUS EVERY 12 HOURS
Status: DISCONTINUED | OUTPATIENT
Start: 2024-02-18 | End: 2024-02-18

## 2024-02-18 RX ORDER — CIPROFLOXACIN 500 MG/1
500 TABLET, FILM COATED ORAL EVERY 12 HOURS SCHEDULED
Status: DISCONTINUED | OUTPATIENT
Start: 2024-02-18 | End: 2024-02-20 | Stop reason: HOSPADM

## 2024-02-18 RX ADMIN — DORZOLAMIDE HYDROCHLORIDE 1 DROP: 20 SOLUTION/ DROPS OPHTHALMIC at 21:08

## 2024-02-18 RX ADMIN — PANTOPRAZOLE SODIUM 40 MG: 40 TABLET, DELAYED RELEASE ORAL at 05:26

## 2024-02-18 RX ADMIN — DORZOLAMIDE HYDROCHLORIDE 1 DROP: 20 SOLUTION/ DROPS OPHTHALMIC at 09:36

## 2024-02-18 RX ADMIN — BRIMONIDINE TARTRATE 1 DROP: 2 SOLUTION OPHTHALMIC at 21:08

## 2024-02-18 RX ADMIN — SODIUM CHLORIDE, PRESERVATIVE FREE 10 ML: 5 INJECTION INTRAVENOUS at 09:36

## 2024-02-18 RX ADMIN — LEVETIRACETAM 250 MG: 500 TABLET, FILM COATED ORAL at 21:08

## 2024-02-18 RX ADMIN — DORZOLAMIDE HYDROCHLORIDE 1 DROP: 20 SOLUTION/ DROPS OPHTHALMIC at 14:15

## 2024-02-18 RX ADMIN — CIPROFLOXACIN 400 MG: 400 INJECTION, SOLUTION INTRAVENOUS at 09:47

## 2024-02-18 RX ADMIN — SERTRALINE HYDROCHLORIDE 50 MG: 50 TABLET ORAL at 09:35

## 2024-02-18 RX ADMIN — CIPROFLOXACIN HYDROCHLORIDE 500 MG: 500 TABLET, FILM COATED ORAL at 21:08

## 2024-02-18 RX ADMIN — ATORVASTATIN CALCIUM 40 MG: 40 TABLET, FILM COATED ORAL at 09:35

## 2024-02-18 RX ADMIN — LEVOTHYROXINE SODIUM 50 MCG: 0.05 TABLET ORAL at 05:26

## 2024-02-18 RX ADMIN — SERTRALINE HYDROCHLORIDE 25 MG: 25 TABLET ORAL at 09:36

## 2024-02-18 RX ADMIN — BRIMONIDINE TARTRATE 1 DROP: 2 SOLUTION OPHTHALMIC at 14:15

## 2024-02-18 RX ADMIN — SODIUM CHLORIDE, SODIUM LACTATE, POTASSIUM CHLORIDE, CALCIUM CHLORIDE AND DEXTROSE MONOHYDRATE: 5; 600; 310; 30; 20 INJECTION, SOLUTION INTRAVENOUS at 04:44

## 2024-02-18 RX ADMIN — ENOXAPARIN SODIUM 40 MG: 100 INJECTION SUBCUTANEOUS at 09:34

## 2024-02-18 RX ADMIN — LEVETIRACETAM 250 MG: 100 INJECTION, SOLUTION INTRAVENOUS at 09:34

## 2024-02-18 RX ADMIN — BRIMONIDINE TARTRATE 1 DROP: 2 SOLUTION OPHTHALMIC at 09:36

## 2024-02-18 ASSESSMENT — PAIN SCALES - GENERAL
PAINLEVEL_OUTOF10: 0
PAINLEVEL_OUTOF10: 0

## 2024-02-19 LAB
ALBUMIN SERPL-MCNC: 3.8 G/DL (ref 3.4–5)
ANION GAP SERPL CALCULATED.3IONS-SCNC: 9 MMOL/L (ref 3–16)
BASOPHILS # BLD: 0.1 K/UL (ref 0–0.2)
BASOPHILS NFR BLD: 0.8 %
BUN SERPL-MCNC: 6 MG/DL (ref 7–20)
CALCIUM SERPL-MCNC: 8.9 MG/DL (ref 8.3–10.6)
CHLORIDE SERPL-SCNC: 102 MMOL/L (ref 99–110)
CO2 SERPL-SCNC: 29 MMOL/L (ref 21–32)
CREAT SERPL-MCNC: <0.5 MG/DL (ref 0.6–1.2)
DEPRECATED RDW RBC AUTO: 14.3 % (ref 12.4–15.4)
EOSINOPHIL # BLD: 0.2 K/UL (ref 0–0.6)
EOSINOPHIL NFR BLD: 2.5 %
GFR SERPLBLD CREATININE-BSD FMLA CKD-EPI: >60 ML/MIN/{1.73_M2}
GLUCOSE BLD-MCNC: 102 MG/DL (ref 70–99)
GLUCOSE BLD-MCNC: 106 MG/DL (ref 70–99)
GLUCOSE BLD-MCNC: 91 MG/DL (ref 70–99)
GLUCOSE BLD-MCNC: 94 MG/DL (ref 70–99)
GLUCOSE SERPL-MCNC: 92 MG/DL (ref 70–99)
HCT VFR BLD AUTO: 35.1 % (ref 36–48)
HGB BLD-MCNC: 12 G/DL (ref 12–16)
LYMPHOCYTES # BLD: 1.3 K/UL (ref 1–5.1)
LYMPHOCYTES NFR BLD: 18.8 %
MAGNESIUM SERPL-MCNC: 1.8 MG/DL (ref 1.8–2.4)
MCH RBC QN AUTO: 28 PG (ref 26–34)
MCHC RBC AUTO-ENTMCNC: 34.1 G/DL (ref 31–36)
MCV RBC AUTO: 81.9 FL (ref 80–100)
MONOCYTES # BLD: 0.7 K/UL (ref 0–1.3)
MONOCYTES NFR BLD: 10.4 %
NEUTROPHILS # BLD: 4.7 K/UL (ref 1.7–7.7)
NEUTROPHILS NFR BLD: 67.5 %
PERFORMED ON: ABNORMAL
PERFORMED ON: ABNORMAL
PERFORMED ON: NORMAL
PERFORMED ON: NORMAL
PHOSPHATE SERPL-MCNC: 3.1 MG/DL (ref 2.5–4.9)
PLATELET # BLD AUTO: 184 K/UL (ref 135–450)
PMV BLD AUTO: 8.3 FL (ref 5–10.5)
POTASSIUM SERPL-SCNC: 3.8 MMOL/L (ref 3.5–5.1)
RBC # BLD AUTO: 4.29 M/UL (ref 4–5.2)
SODIUM SERPL-SCNC: 140 MMOL/L (ref 136–145)
WBC # BLD AUTO: 6.9 K/UL (ref 4–11)

## 2024-02-19 PROCEDURE — 2580000003 HC RX 258: Performed by: INTERNAL MEDICINE

## 2024-02-19 PROCEDURE — 36415 COLL VENOUS BLD VENIPUNCTURE: CPT

## 2024-02-19 PROCEDURE — 92610 EVALUATE SWALLOWING FUNCTION: CPT

## 2024-02-19 PROCEDURE — 6370000000 HC RX 637 (ALT 250 FOR IP): Performed by: NURSE PRACTITIONER

## 2024-02-19 PROCEDURE — 6370000000 HC RX 637 (ALT 250 FOR IP): Performed by: HOSPITALIST

## 2024-02-19 PROCEDURE — 6370000000 HC RX 637 (ALT 250 FOR IP): Performed by: INTERNAL MEDICINE

## 2024-02-19 PROCEDURE — 97166 OT EVAL MOD COMPLEX 45 MIN: CPT

## 2024-02-19 PROCEDURE — 6360000002 HC RX W HCPCS: Performed by: INTERNAL MEDICINE

## 2024-02-19 PROCEDURE — 83735 ASSAY OF MAGNESIUM: CPT

## 2024-02-19 PROCEDURE — 94760 N-INVAS EAR/PLS OXIMETRY 1: CPT

## 2024-02-19 PROCEDURE — 6360000002 HC RX W HCPCS: Performed by: HOSPITALIST

## 2024-02-19 PROCEDURE — 2060000000 HC ICU INTERMEDIATE R&B

## 2024-02-19 PROCEDURE — 97535 SELF CARE MNGMENT TRAINING: CPT

## 2024-02-19 PROCEDURE — 80069 RENAL FUNCTION PANEL: CPT

## 2024-02-19 PROCEDURE — 85025 COMPLETE CBC W/AUTO DIFF WBC: CPT

## 2024-02-19 RX ORDER — POTASSIUM CHLORIDE 7.45 MG/ML
10 INJECTION INTRAVENOUS
Status: DISPENSED | OUTPATIENT
Start: 2024-02-19 | End: 2024-02-19

## 2024-02-19 RX ORDER — MAGNESIUM SULFATE IN WATER 40 MG/ML
2000 INJECTION, SOLUTION INTRAVENOUS ONCE
Status: COMPLETED | OUTPATIENT
Start: 2024-02-19 | End: 2024-02-19

## 2024-02-19 RX ORDER — AMLODIPINE BESYLATE 5 MG/1
5 TABLET ORAL ONCE
Status: DISCONTINUED | OUTPATIENT
Start: 2024-02-19 | End: 2024-02-20 | Stop reason: HOSPADM

## 2024-02-19 RX ADMIN — DORZOLAMIDE HYDROCHLORIDE 1 DROP: 20 SOLUTION/ DROPS OPHTHALMIC at 23:14

## 2024-02-19 RX ADMIN — POTASSIUM CHLORIDE 10 MEQ: 7.46 INJECTION, SOLUTION INTRAVENOUS at 19:23

## 2024-02-19 RX ADMIN — LEVETIRACETAM 250 MG: 500 TABLET, FILM COATED ORAL at 09:21

## 2024-02-19 RX ADMIN — ENOXAPARIN SODIUM 40 MG: 100 INJECTION SUBCUTANEOUS at 09:24

## 2024-02-19 RX ADMIN — BRIMONIDINE TARTRATE 1 DROP: 2 SOLUTION OPHTHALMIC at 23:13

## 2024-02-19 RX ADMIN — SODIUM CHLORIDE, SODIUM LACTATE, POTASSIUM CHLORIDE, CALCIUM CHLORIDE AND DEXTROSE MONOHYDRATE: 5; 600; 310; 30; 20 INJECTION, SOLUTION INTRAVENOUS at 19:23

## 2024-02-19 RX ADMIN — LEVETIRACETAM 250 MG: 500 TABLET, FILM COATED ORAL at 23:10

## 2024-02-19 RX ADMIN — LEVOTHYROXINE SODIUM 50 MCG: 0.05 TABLET ORAL at 06:01

## 2024-02-19 RX ADMIN — MAGNESIUM SULFATE HEPTAHYDRATE 2000 MG: 40 INJECTION, SOLUTION INTRAVENOUS at 19:20

## 2024-02-19 RX ADMIN — DORZOLAMIDE HYDROCHLORIDE 1 DROP: 20 SOLUTION/ DROPS OPHTHALMIC at 16:09

## 2024-02-19 RX ADMIN — ATORVASTATIN CALCIUM 40 MG: 40 TABLET, FILM COATED ORAL at 09:20

## 2024-02-19 RX ADMIN — SERTRALINE HYDROCHLORIDE 25 MG: 25 TABLET ORAL at 09:20

## 2024-02-19 RX ADMIN — DORZOLAMIDE HYDROCHLORIDE 1 DROP: 20 SOLUTION/ DROPS OPHTHALMIC at 09:25

## 2024-02-19 RX ADMIN — BRIMONIDINE TARTRATE 1 DROP: 2 SOLUTION OPHTHALMIC at 16:09

## 2024-02-19 RX ADMIN — CIPROFLOXACIN HYDROCHLORIDE 500 MG: 500 TABLET, FILM COATED ORAL at 23:11

## 2024-02-19 RX ADMIN — PANTOPRAZOLE SODIUM 40 MG: 40 TABLET, DELAYED RELEASE ORAL at 06:01

## 2024-02-19 RX ADMIN — CIPROFLOXACIN HYDROCHLORIDE 500 MG: 500 TABLET, FILM COATED ORAL at 09:21

## 2024-02-19 RX ADMIN — BRIMONIDINE TARTRATE 1 DROP: 2 SOLUTION OPHTHALMIC at 09:25

## 2024-02-19 RX ADMIN — SERTRALINE HYDROCHLORIDE 50 MG: 50 TABLET ORAL at 09:21

## 2024-02-19 ASSESSMENT — PAIN SCALES - GENERAL: PAINLEVEL_OUTOF10: 0

## 2024-02-19 ASSESSMENT — PAIN SCALES - WONG BAKER: WONGBAKER_NUMERICALRESPONSE: 0

## 2024-02-19 NOTE — FLOWSHEET NOTE
Pt had elevated bp this am when done by PCA. Rechecked pt at [facility] and still elevated. Bp taken while in bed, eyes close, and not moving when bp was being taken. Reached out to Shabbir, Ehsan, MD and they ordered one time dose of Norvasc. When I went to administer, pt bp now WNL. Pt up in chair, eyes closed, and not moving when bp taken. Will hold norvasc for now and notify Shabbir, Ehsan, MD.       02/19/24 0709 02/19/24 0921 02/19/24 0931   Vital Signs   Temp 97.8 °F (36.6 °C)  --   --    Temp Source Oral  --   --    Pulse 64 63 63   Heart Rate Source Monitor  --   --    Respirations 19 12 14   BP (!) 176/73  --  (!) 163/69  (recheck)   MAP (Calculated) 107  --  100   MAP (mmHg) 107  --  92   BP Location Left Arm  --  Left upper arm   BP Method  --   --   --    Patient Position  --   --  High fowlers   Oxygen Therapy   SpO2 91 % 92 %  --    Pulse Oximetry Type  --   --   --    Pulse via Oximetry  --   --   --    Pulse Oximeter Device Mode  --  Intermittent  --    Pulse Oximeter Device Location  --  Finger  --    O2 Device  --  None (Room air)  --       02/19/24 1113   Vital Signs   Temp 98.4 °F (36.9 °C)   Temp Source Oral   Pulse 62   Heart Rate Source  --    Respirations 14   BP (!) 114/58   MAP (Calculated) 77   MAP (mmHg) 75   BP Location Left upper arm   BP Method Automatic   Patient Position Up in chair   Oxygen Therapy   SpO2 93 %   Pulse Oximetry Type Intermittent   Pulse via Oximetry 62 beats per minute   Pulse Oximeter Device Mode Intermittent   Pulse Oximeter Device Location Right;Finger   O2 Device None (Room air)

## 2024-02-20 VITALS
BODY MASS INDEX: 30.26 KG/M2 | OXYGEN SATURATION: 98 % | DIASTOLIC BLOOD PRESSURE: 57 MMHG | WEIGHT: 160.27 LBS | TEMPERATURE: 98.1 F | HEART RATE: 61 BPM | SYSTOLIC BLOOD PRESSURE: 142 MMHG | RESPIRATION RATE: 15 BRPM | HEIGHT: 61 IN

## 2024-02-20 LAB
ANION GAP SERPL CALCULATED.3IONS-SCNC: 9 MMOL/L (ref 3–16)
BUN SERPL-MCNC: 8 MG/DL (ref 7–20)
CALCIUM SERPL-MCNC: 8.4 MG/DL (ref 8.3–10.6)
CHLORIDE SERPL-SCNC: 105 MMOL/L (ref 99–110)
CO2 SERPL-SCNC: 28 MMOL/L (ref 21–32)
CREAT SERPL-MCNC: <0.5 MG/DL (ref 0.6–1.2)
DEPRECATED RDW RBC AUTO: 14.5 % (ref 12.4–15.4)
GFR SERPLBLD CREATININE-BSD FMLA CKD-EPI: >60 ML/MIN/{1.73_M2}
GLUCOSE BLD-MCNC: 110 MG/DL (ref 70–99)
GLUCOSE BLD-MCNC: 118 MG/DL (ref 70–99)
GLUCOSE BLD-MCNC: 125 MG/DL (ref 70–99)
GLUCOSE BLD-MCNC: 140 MG/DL (ref 70–99)
GLUCOSE BLD-MCNC: 158 MG/DL (ref 70–99)
GLUCOSE SERPL-MCNC: 109 MG/DL (ref 70–99)
HCT VFR BLD AUTO: 34.3 % (ref 36–48)
HGB BLD-MCNC: 11.4 G/DL (ref 12–16)
MAGNESIUM SERPL-MCNC: 2.6 MG/DL (ref 1.8–2.4)
MCH RBC QN AUTO: 27.6 PG (ref 26–34)
MCHC RBC AUTO-ENTMCNC: 33.3 G/DL (ref 31–36)
MCV RBC AUTO: 83 FL (ref 80–100)
PERFORMED ON: ABNORMAL
PLATELET # BLD AUTO: 173 K/UL (ref 135–450)
PMV BLD AUTO: 8.8 FL (ref 5–10.5)
POTASSIUM SERPL-SCNC: 3.8 MMOL/L (ref 3.5–5.1)
RBC # BLD AUTO: 4.14 M/UL (ref 4–5.2)
SODIUM SERPL-SCNC: 142 MMOL/L (ref 136–145)
WBC # BLD AUTO: 5.2 K/UL (ref 4–11)

## 2024-02-20 PROCEDURE — 97530 THERAPEUTIC ACTIVITIES: CPT

## 2024-02-20 PROCEDURE — 6370000000 HC RX 637 (ALT 250 FOR IP): Performed by: HOSPITALIST

## 2024-02-20 PROCEDURE — 97116 GAIT TRAINING THERAPY: CPT

## 2024-02-20 PROCEDURE — 83735 ASSAY OF MAGNESIUM: CPT

## 2024-02-20 PROCEDURE — 85027 COMPLETE CBC AUTOMATED: CPT

## 2024-02-20 PROCEDURE — 36415 COLL VENOUS BLD VENIPUNCTURE: CPT

## 2024-02-20 PROCEDURE — 6370000000 HC RX 637 (ALT 250 FOR IP): Performed by: NURSE PRACTITIONER

## 2024-02-20 PROCEDURE — 2580000003 HC RX 258: Performed by: INTERNAL MEDICINE

## 2024-02-20 PROCEDURE — 92526 ORAL FUNCTION THERAPY: CPT

## 2024-02-20 PROCEDURE — 6370000000 HC RX 637 (ALT 250 FOR IP): Performed by: INTERNAL MEDICINE

## 2024-02-20 PROCEDURE — 97535 SELF CARE MNGMENT TRAINING: CPT

## 2024-02-20 PROCEDURE — 80048 BASIC METABOLIC PNL TOTAL CA: CPT

## 2024-02-20 PROCEDURE — 83036 HEMOGLOBIN GLYCOSYLATED A1C: CPT

## 2024-02-20 PROCEDURE — 6360000002 HC RX W HCPCS: Performed by: INTERNAL MEDICINE

## 2024-02-20 PROCEDURE — 6360000002 HC RX W HCPCS: Performed by: HOSPITALIST

## 2024-02-20 RX ORDER — AMLODIPINE BESYLATE 5 MG/1
5 TABLET ORAL ONCE
Qty: 30 TABLET | Refills: 3 | DISCHARGE
Start: 2024-02-20 | End: 2024-02-20

## 2024-02-20 RX ORDER — PANTOPRAZOLE SODIUM 40 MG/1
40 TABLET, DELAYED RELEASE ORAL
Qty: 30 TABLET | Refills: 3 | DISCHARGE
Start: 2024-02-21

## 2024-02-20 RX ORDER — POTASSIUM CHLORIDE 7.45 MG/ML
10 INJECTION INTRAVENOUS ONCE
Status: COMPLETED | OUTPATIENT
Start: 2024-02-20 | End: 2024-02-20

## 2024-02-20 RX ORDER — CIPROFLOXACIN 500 MG/1
500 TABLET, FILM COATED ORAL EVERY 12 HOURS SCHEDULED
Qty: 6 TABLET | Refills: 0 | DISCHARGE
Start: 2024-02-20 | End: 2024-02-23

## 2024-02-20 RX ORDER — LEVETIRACETAM 250 MG/1
250 TABLET ORAL 2 TIMES DAILY
Qty: 60 TABLET | Refills: 3 | DISCHARGE
Start: 2024-02-20

## 2024-02-20 RX ADMIN — SODIUM CHLORIDE, SODIUM LACTATE, POTASSIUM CHLORIDE, CALCIUM CHLORIDE AND DEXTROSE MONOHYDRATE: 5; 600; 310; 30; 20 INJECTION, SOLUTION INTRAVENOUS at 04:28

## 2024-02-20 RX ADMIN — LEVOTHYROXINE SODIUM 50 MCG: 0.05 TABLET ORAL at 06:31

## 2024-02-20 RX ADMIN — CIPROFLOXACIN HYDROCHLORIDE 500 MG: 500 TABLET, FILM COATED ORAL at 08:23

## 2024-02-20 RX ADMIN — BRIMONIDINE TARTRATE 1 DROP: 2 SOLUTION OPHTHALMIC at 08:22

## 2024-02-20 RX ADMIN — LEVETIRACETAM 250 MG: 500 TABLET, FILM COATED ORAL at 08:23

## 2024-02-20 RX ADMIN — ENOXAPARIN SODIUM 40 MG: 100 INJECTION SUBCUTANEOUS at 08:26

## 2024-02-20 RX ADMIN — SERTRALINE HYDROCHLORIDE 25 MG: 25 TABLET ORAL at 08:23

## 2024-02-20 RX ADMIN — POTASSIUM CHLORIDE 10 MEQ: 7.46 INJECTION, SOLUTION INTRAVENOUS at 07:12

## 2024-02-20 RX ADMIN — DORZOLAMIDE HYDROCHLORIDE 1 DROP: 20 SOLUTION/ DROPS OPHTHALMIC at 08:22

## 2024-02-20 RX ADMIN — PANTOPRAZOLE SODIUM 40 MG: 40 TABLET, DELAYED RELEASE ORAL at 06:31

## 2024-02-20 RX ADMIN — SERTRALINE HYDROCHLORIDE 50 MG: 50 TABLET ORAL at 08:24

## 2024-02-20 RX ADMIN — ATORVASTATIN CALCIUM 40 MG: 40 TABLET, FILM COATED ORAL at 08:23

## 2024-02-20 RX ADMIN — DORZOLAMIDE HYDROCHLORIDE 1 DROP: 20 SOLUTION/ DROPS OPHTHALMIC at 14:10

## 2024-02-20 RX ADMIN — BRIMONIDINE TARTRATE 1 DROP: 2 SOLUTION OPHTHALMIC at 14:10

## 2024-02-20 NOTE — FLOWSHEET NOTE
02/20/24 0338   Vital Signs   Temp 97.4 °F (36.3 °C)   Temp Source Axillary   Pulse 72   Heart Rate Source Monitor   Respirations 20   BP (!) 154/72   MAP (Calculated) 99   MAP (mmHg) 99   BP Location Left Arm   Oxygen Therapy   SpO2 95 %   O2 Device None (Room air)   Rhythm Interpretation   RN Validation Agree with rhythm interpretation and measurements

## 2024-02-20 NOTE — CARE COORDINATION
CORINNE reached out to [facility] today at [phone]. SW faxed updated therapy notes for nursing to review at [phone] so they can call us back with a decision.     Respectfully submitted,    Seble BELLA, LUIS  [facility]   [phone]    Electronically signed by SHAYNE Gao, LSW on 2/20/2024 at 9:25 AM

## 2024-02-20 NOTE — CARE COORDINATION
Case Management Discharge Note          Date / Time of Note: 2/20/2024 4:19 PM                  Patient Name: Modesta Crockett   YOB: 1935  Diagnosis: Hiccups [R06.6]  Seizure (HCC) [R56.9]  Goals of care, counseling/discussion [Z71.89]  New onset seizure (HCC) [R56.9]   Date / Time: 2/15/2024 12:47 PM    Financial:  Payor: [facility] MEDICARE / Plan: [facility] MEDICARE COMPLETE / Product Type: *No Product type* /      Pharmacy:    [facility] [id number] [place], [place] - [address] - P [phone] - F [phone]  [address] [facility] [address]  Phone: [phone] Fax: [phone]    [id number] [place], [place] - [address]  - P [phone] - F [phone]  [address] [facility] [address]  Phone: [phone] Fax: [phone]    [facility] - [place] [id number] [facility] [phone] - F [phone]  [facility] [address] [address]  Phone: [phone] Fax: [phone]      Assistance purchasing medications?: Potential Assistance Purchasing Medications: No  Assistance provided by Case Management: None at this time    DISCHARGE Disposition: Home with Home Health Care    Home Care:  Home Care ordered at discharge: Yes  Home Care Agency: [facility]   Phone: [phone]  Fax: [phone]  Orders faxed: Yes      Referrals made at DISCHARGE for outpatient continued care:  Return to [facility]    Transportation:  Transportation PLAN for discharge: family   Mode of Transport: Private Car  Time of Transport: TBD by family and/or medical staff.     Transport form completed: Not Indicated    IMM Completed:   Yes, Case management has presented and reviewed IMM letter #2.           .   Patient and/or family/POA verbalized understanding of their medicare rights and appeal process if needed. Patient and/or family/POA has signed, initialed and placed the date and time

## 2024-02-20 NOTE — FLOWSHEET NOTE
02/19/24 2322   Vital Signs   Temp 97.9 °F (36.6 °C)   Temp Source Oral   Pulse 56   Heart Rate Source Monitor   Respirations 16   /63   MAP (Calculated) 87   MAP (mmHg) 87   BP Location Left Arm   Oxygen Therapy   SpO2 94 %   O2 Device None (Room air)

## 2024-02-20 NOTE — CARE COORDINATION
Discharge order noted. SW faxed updated therapy notes from today to DON at [facility] at [phone] . We are currently on the line with the facility.     Respectfully submitted,    Seble BELLA, WESS  [facility]   [phone]    Electronically signed by SHAYNE Gao, LSW on 2/20/2024 at 3:17 PM

## 2024-02-20 NOTE — DISCHARGE INSTR - COC
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5    Recurrent UTI (urinary tract infection) N39.0    Senile purpura (HCC) D69.2    Shoulder pain M25.519    Spinal stenosis of lumbar region M48.061    Vaginal atrophy N95.2    Vitamin D deficiency E55.9    Sciatica of left side M54.32    Dementia of the Alzheimer's type, with late onset, with delirium (HCC) G30.1, F02.82    Witnessed seizure (HCC) R56.9    Postictal state (HCC) R56.9       Isolation/Infection:   Isolation            No Isolation          Patient Infection Status       None to display            Nurse Assessment:  Last Vital Signs: BP (!) 148/57   Pulse 54   Temp 97.5 °F (36.4 °C) (Oral)   Resp 15   Ht 1.549 m (5' 1\")   Wt 72.7 kg (160 lb 4.4 oz)   SpO2 98%   BMI 30.28 kg/m²     Last documented pain score (0-10 scale):

## 2024-02-20 NOTE — CARE COORDINATION
[facility] is the provider for transport home. If there are delays or questions please call [phone].    Respectfully submitted,    Seble BELLA, BHUPINDERS  [facility]   [phone]    Electronically signed by SHAYNE Gao, LSW on 2/20/2024 at 5:14 PM        UPDATE  Called [facility] to get ETA as they have not arrived. Spoke to Sana who relays they are 35-45 mins away. Will await arrival.    Electronically signed by Lisa Cristobal RN on 2/20/2024 at 6:49 PM

## 2024-02-20 NOTE — CARE COORDINATION
SW reached out to son Noé today and he is requesting transport back to [facility]. CORINNE entered info in roundtrip and requested at 6pm transport.     Respectfully submitted,    Seble BELLA, LUIS  [facility]   [phone]    Electronically signed by SHAYNE Gao, LSW on 2/20/2024 at 4:38 PM\

## 2024-02-20 NOTE — CARE COORDINATION
They received our old therapy notes at [facility] this morning, hadn't reviewed them but will pull this writer's most recent notes, will review, discuss with DON and call us back with a decision.     Respectfully submitted,    Seble BELLA, LUIS  [facility]   [phone]    Electronically signed by SHAYNE Gao, PADMINI on 2/20/2024 at 3:25 PM

## 2024-02-20 NOTE — FLOWSHEET NOTE
02/19/24 1922   Vital Signs   Temp 98.4 °F (36.9 °C)   Temp Source Axillary   Pulse 62   Heart Rate Source Monitor   Respirations 16   BP (!) 153/67   MAP (Calculated) 96   MAP (mmHg) 95   BP Location Left Arm   Oxygen Therapy   SpO2 94 %   O2 Device None (Room air)

## 2024-02-21 LAB
EST. AVERAGE GLUCOSE BLD GHB EST-MCNC: 116.9 MG/DL
HBA1C MFR BLD: 5.7 %

## 2024-02-21 NOTE — DISCHARGE SUMMARY
V2.0  Discharge Summary    Name:  Modesta Crockett /Age/Sex: 1935 (88 y.o. female)   Admit Date: 2/15/2024  Discharge Date: 24    MRN & CSN:  [id number] & [id number] Encounter Date and Time 24 10:08 PM EST    Attending:  No att. providers found Discharging Provider: Faisal Fuller MD       Hospital Course:     Brief HPI: Modesta Crockett is a 88 y.o. female who presented with seizures    Brief Problem Based Course:   Seizures: patient presented to the ED for uncontrolled hiccups. While in the process of being discharged back to her memory care unit, patient exhibited seizure-like activity which lasted approximately 1 minute before spontaneous resolution. UA was concerning for UTI. She was loaded with keppra and neuro was consulted. 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 Patient had no further episodes while admitted and was discharged on keppra.  Urinary tract infection: patient completed a short course of IV antibiotics while admitted.      The patient expressed appropriate understanding of, and agreement with the discharge recommendations, medications, and plan.     Consults this admission:  IP CONSULT TO HOSPITALIST  IP CONSULT TO NEUROLOGY  IP CONSULT TO HOME CARE NEEDS    Discharge Diagnosis:   Witnessed seizure (HCC)  UTI    Discharge Instruction:   Follow up appointments:   Primary care physician: Ania Cortez DO within 2 weeks  Diet: regular diet   Activity: activity as tolerated  Disposition: Discharged to:   [x]Home, [][facility], []SNF, []Acute Rehab, []Hospice   Condition on discharge: Stable  Labs and Tests to be Followed up as an outpatient by PCP or Specialist:     Discharge Medications:        Medication List        START taking these medications      amLODIPine 5 MG tablet  Commonly known as: NORVASC  Take 1 tablet by mouth once for 1 dose     ciprofloxacin 500 MG tablet  Commonly known as: CIPRO  Take 1 tablet by mouth every 12 hours for 6 doses

## 2024-02-21 NOTE — PROGRESS NOTES
V2.0    [facility] [place]      Name:  Modesta Crockett /Age/Sex: 1935  (88 y.o. female)   MRN & CSN:  [id number] & [id number] Encounter Date/Time: 2024 9:50 AM EST   Location:  [id number]/5256-01 PCP: Steve Presley     Attending:Nick Swartz MD       Hospital Day: 2    Assessment and Recommendations   Modesta Crockett is a 88 y.o. female with pmh of dementia who presents with Witnessed seizure (HCC)    Hospital Problems             Last Modified POA    * (Principal) Witnessed seizure (HCC) 2/15/2024 Yes    Localized osteoarthritis of shoulder 2/15/2024 Yes    Hypercholesteremia 2/15/2024 Yes    Overview Signed 8/3/2020  4:09 PM by Roz Abdul LPN     Overview:          Hypothyroidism (acquired) 2/15/2024 Yes    Overview Signed 8/3/2020  4:09 PM by Roz Abdul LPN     Overview:          Acute metabolic encephalopathy 2/15/2024 Yes    Basal cell carcinoma (BCC) of face 2/15/2024 Yes    Overview Signed 8/3/2020  4:09 PM by Roz Abdul LPN     Overview:   Overview:   Dr ZENAIDA Rodriguez for Mohs  Overview:   Dr ZENAIDA Rodriguez for [facility]         Essential hypertension 2/15/2024 Yes    Osteoarthritis of right AC (acromioclavicular) joint 2/15/2024 Yes    Dementia of the Alzheimer's type, with late onset, with delirium (HCC) 2/15/2024 Yes    Overview Signed 10/13/2021  1:55 PM by Ingrid Vela, RN     Formatting of this note might be different from the original.  2021 Trial Donepezil         Postictal state (HCC) 2024 Yes       Plan:   Ct Keppra  Seizure precaution and neuro checks  Neurology consult  MRI and EEG  NPO  and SLP eval  IV hydration   Thorazine reglan prn for hiccups  DVT prophylaxis       Diet Diet NPO   DVT Prophylaxis [x] Lovenox, []  Heparin, [] SCDs, [] Ambulation,  [] Eliquis, [] Xarelto  [] Coumadin   Code Status DNR-CCA   Disposition From: NH  Expected Disposition: NH  Estimated Date of Discharge: 2024  Patient requires continued admission due to seizure work up swallow 
  Ehsan Shabbir, MD    Hospitalist Progress Note      Name:  Modesta Crockett /Age/Sex: 1935  (88 y.o. female)   MRN & CSN:  [id number] & [id number] Admission Date/Time: 2/15/2024 12:47 PM   Location:  [id number]/5256-01 PCP: Ania Cortez DO       I saw and examined the patient on 2024 at 10:03 AM.    Hospital Day: 5  Barriers to discharge: Lethargic, placement, on dextrose IV  Assessment and Plan:     88 years old female admitted from [facility] with uncontrolled hiccups and new-onset seizure in setting of metabolic encephalopathy and Alzheimer dementia.  Prolactin 57.5 ng/mL post-ictal. MRI brain shows no acute intracranial abnormality.  No further seizure-like activity.  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      History of present illness:  Brought to the ED for uncontrolled hiccups, while in the process of being discharged back to SNF she developed seizure like activity lasting approximately 1 minute before spontaneous resolution, loaded on 1500 mg Keppra.  She is intermittently confused due to her baseline dementia. She also lost her dad 91-year-old  approximately a year ago to a massive acute coronary syndrome. She has reportedly taken a turn for the worse since that loss.  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       New onset seizure:  Continues on levetiracetam 250 mg BID per neurology.  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  Seizure precautions and neuro checks x 3 days.  Neurology recommendations noted  Lethargic this morning with elevated blood pressure. No obvious seizures.  Discussed with RN, amlodipine, close monitoring recurrent seizure.  Poor p.o. intake still on D5 LR, will discontinue IV fluids once patient is more awake and able to tolerate p.o. diet  Urinary tract infection:  Positive nitrite.  Empiric therapy with ciprofloxacin.  Allergy to 
  Physician Progress Note      PATIENT:               AR MADISON  CSN #:                  [id number]  :                       1935  ADMIT DATE:       2/15/2024 12:47 PM  DISCH DATE:        2024 8:00 PM  RESPONDING  PROVIDER #:        Faisal Fuller MD          QUERY TEXT:    Dear Dr Fuller,  Pt admitted 2/15 with s/p seizure in ED and has noted \"acute metabolic   encephalopathy\" documented in H and P and also post-ictal and baseline   dementia. Pt received Keppra and dose reduced by Neuro stating \"may be too   much for her\". On  \"pt noted to be lethargic and unable to awaken\" and   also notes no further seizures. If possible, please document in progress notes   and discharge summary further specificity regarding if the lethargic episode   on :    The medical record reflects the following:  Risk Factors: Hx dementia, recurrent UTI, basal skin Ca, L hip fx, HTN, HLD,   Hypothyroidism, OA, Current new onset seizure, received 1.5 grams Keppra IV   s/p seizure, UA+  Clinical Indicators: 2/15 Seizure in ED, Keppra given, Admitted for new onset   seizure, noted to be \"post-ictal\". RN notes \"Pt initially fairly alert this AM   when RN arrived. Pt has since become fairly lethargic only responding with   mumbling when awakened.   Neuro \"Currently she is very drowsy\", \"No   further seizure activity has been observed\", and \"She was placed on 1500 mg   LEV which may be too much for her... decreased this down to 250 mg BID\". 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 
4 Eyes Skin Assessment     NAME:  Modesta Crockett  YOB: 1935  MEDICAL RECORD NUMBER:  [id number]    The patient is being assessed for  Admission    I agree that at least one RN has performed a thorough Head to Toe Skin Assessment on the patient. ALL assessment sites listed below have been assessed.      Areas assessed by both nurses:    Head, Face, Ears, Shoulders, Back, Chest, Arms, Elbows, Hands, Sacrum. Buttock, Coccyx, Ischium, Legs. Feet and Heels, and Under Medical Devices         Does the Patient have a Wound? No noted wound(s)       James Prevention initiated by RN: Yes  Wound Care Orders initiated by RN: No    Pressure Injury (Stage 3,4, Unstageable, DTI, NWPT, and Complex wounds) if present, place Wound referral order by RN under : No    New Ostomies, if present place, Ostomy referral order under : No     Nurse 1 eSignature: Electronically signed by Danis Brunson RN on 2/15/24 at 10:41 PM EST    **SHARE this note so that the co-signing nurse can place an eSignature**    Nurse 2 eSignature: Electronically signed by Shannon Harrell RN on 2/15/24 at 10:44 PM EST   
[facility] arrived at bedside @1945. \"Certification of Necessity for non-emergent transport\" not filled out or avail for them to take her. Had to [employer] covering attending medical team to authorize me to fill it out on their behalf. Awaiting Olive VILLAREAL to review and authorize me to use her info for EMS.    Electronically signed by Lisa Cristobal RN on 2/20/2024 at 7:54 PM      Olive VILLAREAL confirmed ok to fill out with her credentials. Done. Copy of cert of necessity put in hard chart.    Electronically signed by Lisa Cristobal RN on 2/20/2024 at 7:56 PM      Gave paperwork to above transport  PT DC'ED.   Report given to Dewey paramedic with [facility].  IV taken out.  DC paperwork including KARUNA sent with EMS.   Pt leaving w/all belongings in room.   Pt safely loaded on to EMS stretcher and safely transported of floor with EMS.  [facility] notified of DC and DC strip requested earlier; spoke to Nirmala.      Electronically signed by Lisa Cristobal RN on 2/20/2024 at 8:05 PM    
Daughter Ansley called and asked for update on pt status. Updated daughter per request. Daughter was thankful. All questions answered at this time.   
EEG completed and available for interpretation on the [facility] database .    
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  While in the process of being discharged back to SNF she developed seizures lasting approximately 1 minute before spontaneous resolution.  Patient is status post 1500 mg Keppra.  She is not able to provide a cogent history and remains postictal.  Daughter-in-law was at bedside and assisted with history.  She is intermittently confused due to her baseline dementia.  She also lost her dad 91-year-old  approximately a year ago to a massive acute coronary syndrome.  She has reportedly taken a turn for the worse since that loss.  Patient was not actively seizing during my evaluation.  Further history limited due to her baseline encephalopathy.    CSE ordered 2024 d/t concern for dysphagia risk 2/2 lethargy  Swallow eval held 2024 and 2024 d/t compromised level of alertness  Diet advanced from NPO to regular 2024 PM    IMAGING:  CXR: 2/15/2024  IMPRESSION:  1.  No acute abnormality.    CT HEAD: 2/15/2024  IMPRESSION:  No acute intracranial abnormality.  Redemonstration of frontal scalp laceration.    BRAIN MRI: 2024  IMPRESSION:  1. Patient motion limits evaluation.  2. No acute intracranial abnormality.  No acute infarct.  3. Mild-to-moderate 
Hospitalist Progress Note  2/17/2024 8:21 AM  Subjective:   Admit Date: 2/15/2024  PCP: Ania Cortez, DO Status: Inpatient   Interval History: Hospital Day: 3, admitted from [facility] with uncontrolled hiccups and new-onset seizure in setting of metabolic encephalopathy and Alzheimer dementia.  Prolactin 57.5 ng/mL post-ictal. She remains NPO, unable to take medications and lethargic. MRI brain shows no acute intracranial abnormality.  No further seizure-like activity.  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     History of present illness:  Brought to the ED for uncontrolled hiccups, while in the process of being discharged back to SNF she developed seizure like activity lasting approximately 1 minute before spontaneous resolution, loaded on 1500 mg Keppra.  She is intermittently confused due to her baseline dementia. She also lost her dad 91-year-old  approximately a year ago to a massive acute coronary syndrome. She has reportedly taken a turn for the worse since that loss.  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      Diet: regular  Right antecubital peripheral IV (2/15, day #3)  Left forearm peripheral IV (2/15, day #3)  External urinary catheter (2/16, day #2)    Medications:     dextrose 5% in lactated ringers  100 ml/hr (2/17, day #1)     levetiracetam  250 mg Oral BID   atorvastatin  40 mg Oral Daily   levothyroxine  50 mcg Oral Daily   sertraline  75 mg Oral Daily   pantoprazole  40 mg Oral QAM AC   enoxaparin  40 mg SubCUTAneous Daily   dorzolamide  1 drop Both Eyes TID   brimonidine  1 drop Both Eyes TID     Recent Labs     02/14/24  2008 02/15/24  1319 02/16/24  0515   WBC 5.1 5.8 6.6   HGB 12.1 11.9* 12.3    188 168   MCV 85.2 83.9 82.7     Recent Labs     02/14/24  2008 02/15/24  1319 02/16/24  0515    143 139   K 3.5 4.2 3.9    104 103   CO2 29 29 23   BUN 18 18 11   CREATININE 0.6 0.6 
Hospitalist Progress Note  2/18/2024 8:34 AM  Subjective:   Admit Date: 2/15/2024  PCP: Ania Cortez, DO Status: Inpatient   Interval History: Hospital Day: 4, admitted from [facility] with uncontrolled hiccups and new-onset seizure in setting of metabolic encephalopathy and Alzheimer dementia.  Prolactin 57.5 ng/mL post-ictal. MRI brain shows no acute intracranial abnormality.  No further seizure-like activity.  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      History of present illness:  Brought to the ED for uncontrolled hiccups, while in the process of being discharged back to SNF she developed seizure like activity lasting approximately 1 minute before spontaneous resolution, loaded on 1500 mg Keppra.  She is intermittently confused due to her baseline dementia. She also lost her dad 91-year-old  approximately a year ago to a massive acute coronary syndrome. She has reportedly taken a turn for the worse since that loss.  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       Diet: regular  Right antecubital peripheral IV (2/15, day #4)  Left forearm peripheral IV (2/15, day #4)  External urinary catheter (2/16, day #3)    Medications:       levetiracetam  250 mg IntraVENous Q12H   atorvastatin  40 mg Oral Daily   levothyroxine  50 mcg Oral Daily   sertraline  25 mg Oral Daily   sertraline  50 mg Oral Daily   pantoprazole  40 mg Oral QAM AC   enoxaparin  40 mg SubCUTAneous Daily   dorzolamide  1 drop Both Eyes TID   brimonidine  1 drop Both Eyes TID     Recent Labs     02/15/24  1319 02/16/24  0515   WBC 5.8 6.6   HGB 11.9* 12.3    168   MCV 83.9 82.7     Recent Labs     02/15/24  1319 02/16/24  0515    139   K 4.2 3.9    103   CO2 29 23   BUN 18 11   CREATININE 0.6 <0.5*   GLUCOSE 87 89     Recent Labs     02/16/24  0515   AST 21   ALT 12   BILITOT 0.6   ALKPHOS 157*     Magnesium (2/14) 2.20 mg/dL  Phosphorus 
Medication Reconciliation    List of medications patient is currently taking is not complete.     Source of information: 1. [facility] Paperwork                                      2. EPIC records      Allergies  Desmopressin acetate, Amoxicillin, and Penicillins     Notes regarding home medications:   1. It is unknown whether the patient received all of her home medications prior to arrival to the emergency department.    2. Patient comes from [facility] in [place], their paperwork was sent over and used to update med list. An attempt to contact the nurse was made, but no administrations could be confirmed    Hilary Palmer, Pharmacy Intern  2/15/2024 7:14 PM            
Notified Shabbir, Ehsan, MD of 2.9 sec pause on tele. Awaiting reply and orders if any.    Electronically signed by Lisa Cristobal RN on 2/19/2024 at 6:41 PM      Shabbir, Ehsan, MD ordered electrolytes to be administered and relays if there is recurrence, will order echo and cards consult.     Electronically signed by Lisa Cristobal RN on 2/19/2024 at 7:01 PM    
Notified Shabbir, Ehsan, MD that pt has no IV access and no note or communication order written by medical staff in chart saying such. Asked if ok to put this in a nurse communication. Shabbir, Ehsan, MD relays he will get back to me.     Shabbir, Ehsan, MD says get PIV and start pending fluids. Jeannette EMERY starting IV. I will start fluids.   
Occupational Therapy  Facility/Department: [address] PROGRESSIVE CARE  Occupational Therapy Initial Assessment    Name: Modesta Crockett  : 1935  MRN: [id number]  Date of Service: 2024    Discharge Recommendations:  Continue to assess pending progress (Return to [facility] w/o w/o OT)  OT Equipment Recommendations  Equipment Needed: No  Other: defer to d/c facility  Modesta Crockett scored a  on the [facility] ADL Inpatient form.     If patient discharges prior to next session this note will serve as a discharge summary.  Please see below for the latest assessment towards goals.       Patient Diagnosis(es): The primary encounter diagnosis was Seizure (HCC). Diagnoses of Hiccups and Goals of care, counseling/discussion were also pertinent to this visit.  Past Medical History:  has a past medical history of Acute cystitis with hematuria, Acute cystitis without hematuria, Acute metabolic encephalopathy, Adhesive capsulitis of unspecified shoulder, Alkaline phosphatase elevation, Alopecia hereditaria, Basal cell carcinoma (BCC) of face, Cancer (HCC), Chondrodermatitis nodularis chronica helicis, Chondromalacia, Chronic idiopathic constipation, Chronic neck pain, Closed left hip fracture (HCC), Continuous leakage of urine, Esophagitis, Facet arthropathy, cervical, Glaucoma, Hypercholesterolemia, Hyperlipidemia, Hypertension, Hyponatremia, Hypothyroidism, IBS (irritable bowel syndrome), Nocturia, Nonmelanoma skin cancer, Osteoarthritis, Osteoarthritis, Overactive bladder, Recurrent UTI, Seasonal allergies, Senile purpura (HCC), Sepsis (HCC), Shoulder pain, Spinal stenosis, Thyroid disease, Urge incontinence, Urinary frequency, Vaginal atrophy, Vitamin D deficiency, and Weight loss.  Past Surgical History:  has a past surgical history that includes eye surgery; cyst removal; Upper gastrointestinal endoscopy (10/18/2016); Colonoscopy (2018); pr colonoscopy flx dx w/collj spec when pfrmd (N/A, 
Occupational Therapy Attempt Note  Modesta Crockett  1935  [id number]    OT orders received and pt chart reviewed. Per chart review and RN report, pt still very lethargic this date and only able to awaken to sternal rub. Will hold off on therapy at this time and will attempt back when patient medically stable/able to participate and therapy schedule allows.     Electronically signed by TIMMY Argueta/L on 2/17/2024 at 12:28 PM   
PT remains lethargic, does awaken for very short periods of time, still unable to follow directions. PT remains NPO at this time due to inability to remain awake.  Electronically signed by Danis Brunson RN on 2/17/2024 at 12:47 AM    
[facility]  Hypoglycemia Event and Prevention Plan      NAME: Modesta Crockett  MEDICAL RECORD NUMBER:  [id number]  AGE: 88 y.o.   GENDER: female  : 1935  EPISODE DATE:  2024     Data     Recent Labs     24  0800 24  1207 24  1745 24  2109 24  0531 24  0807   POCGLU 106* 169* 113* 138* 102* 94       HgBA1c:  No results found for: \"LABA1C\"    BUN/Creatinine:    Lab Results   Component Value Date/Time    BUN 6 2024 06:07 AM    CREATININE <0.5 2024 06:07 AM       Medications  Scheduled Medications:   ciprofloxacin  500 mg Oral 2 times per day    levETIRAcetam  250 mg Oral BID    atorvastatin  40 mg Oral Daily    levothyroxine  50 mcg Oral Daily    sertraline  25 mg Oral Daily    sertraline  50 mg Oral Daily    pantoprazole  40 mg Oral QAM AC    sodium chloride flush  10 mL IntraVENous 2 times per day    enoxaparin  40 mg SubCUTAneous Daily    dorzolamide  1 drop Both Eyes TID    brimonidine  1 drop Both Eyes TID       Diet  Current diet/supplement order: ADULT DIET; Dysphagia - Minced and Moist     Recorded PO: PO Meals Eaten (%): 76 - 100% last meal in flowsheets      Action      Treatment (babar all that apply): D5 with LR continuous     Physician Notified of event: Yes, Feagins by Staff 2024 at 0856am       Achieved POCT Blood Glucose greater than 70 mg/dl: Yes      Pt was seen by Speech today:  \"Solids: IDDSI 5 Minced and moist Solids;   Liquids: IDDSI 0 Thin Liquids;  Meds: Meds PO as tolerated; suspect most optimal if crushed in puree/pudding  Compensatory Swallowing Strategies: Upright as possible with all PO intake , Small bites/sips , Remain upright 30-45 min , Total Feed\"     Recommend: Consider rechecking A1c- last checked 2023 was 5.5. Monitor intake and glucoses.       Electronically signed by Tianna Zuñiga RN on 2024 at 8:31 AM   
Physical Therapy  Attempt Note    Patient Name: Modesta Crockett   Patient : 1935  MRN: [id number]   Room Number: P9B-5570/5256-01      I read OT note indicating that patient is lethargic this date and only awakening to sternal rub. Will hold off on PT evaluation this date and attempt another date when patient medically stable/able to participate and therapy schedule allows.        Brian Olson, PT    
Physical Therapy  Facility/Department: [address] PROGRESSIVE CARE  Physical Therapy Treatment Note    Name: Modesta Crockett  : 1935  MRN: [id number]  Date of Service: 2024    Discharge Recommendations:  Continue to assess pending progress, Long Term Care with PT   PT Equipment Recommendations  Other: Defer to facility.      Current Am-Pac .     Assessment   Body Structures, Functions, Activity Limitations Requiring Skilled Therapeutic Intervention: Decreased functional mobility ;Decreased strength;Decreased safe awareness;Decreased cognition;Decreased balance  Assessment: 89 y/o female admit 2/15/2024 with New Onset Seizures, Met Encephalopathy, Uncontrolled HTN.  PMH as noted including IBS, Spinal Stenosis, Hip Fx, Adhesive Capsulitits B Shlds, Skin Ca, Dementia.  Pt admit from facility (memory care); reports amb with walker although unable to obtain any further info re functional activities/level of assist.  Pt appears more alert/engaged with further functional activities.  Pt requiring Min assist to eob.   Pt able to transfer/amb short distances at bedside with Walker Min assist.  Limited success with cues for safe walker manage due to dementia.  Amb unsteady although no specific LOB.   At this time, anticipate return to facility.  May benefit from cont PT.  Therapy Prognosis: Good  History: 89 y/o female admit 2/15/2024 with New Onset Seizures, Met Encephalopathy, Uncontrolled HTN.  PMH as noted including IBS, Spinal Stenosis, Hip Fx, Adhesive Capsulitits B Shlds, Skin Ca, Dementia.  Exam: See above.  Clinical Presentation: See above.  Barriers to Learning: Cognitive.  Requires PT Follow-Up: Yes  Activity Tolerance  Activity Tolerance: Patient tolerated treatment well;Treatment limited secondary to decreased cognition     Plan   Physical Therapy Plan  General Plan: 3-5 times per week  Current Treatment Recommendations: Strengthening, Therapeutic activities, Functional mobility training, Transfer 
Physical Therapy  Facility/Department: [address] PROGRESSIVE CARE  Physical Therapy Initial Assessment    Name: Modesta Crockett  : 1935  MRN: [id number]  Date of Service: 2024    Discharge Recommendations:  Continue to assess pending progress (Return to facility with/without PT; will cont to monitor pt's progress.)   PT Equipment Recommendations  Other: Defer to facility.  Current Am-Pac .           Assessment   Body Structures, Functions, Activity Limitations Requiring Skilled Therapeutic Intervention: Decreased functional mobility ;Decreased strength;Decreased safe awareness;Decreased cognition;Decreased balance  Assessment: 89 y/o female admit 2/15/2024 with New Onset Seizures, Met Encephalopathy, Uncontrolled HTN.  PMH as noted including IBS, Spinal Stenosis, Hip Fx, Adhesive Capsulitits B Shlds, Skin Ca, Dementia.  Pt admit from facility (memory care); reports amb with walker although unable to obtain any further info re functional activities/level of assist.  Pt currently pleasantly confused although able to participate with PT Eval.  Pt requiring Min assist to eob.  Unsuccess transfer to stand at eob with Walker; Transfers with Stedy Min assist.  At this time, anticipate return to facililty; monitor for cont PT.  Therapy Prognosis: Good  Decision Making: Low Complexity  History: 89 y/o female admit 2/15/2024 with New Onset Seizures, Met Encephalopathy, Uncontrolled HTN.  PMH as noted including IBS, Spinal Stenosis, Hip Fx, Adhesive Capsulitits B Shlds, Skin Ca, Dementia.  Exam: See above.  Clinical Presentation: See above.  Barriers to Learning: Cognitive.  Requires PT Follow-Up: Yes  Activity Tolerance  Activity Tolerance: Patient tolerated treatment well;Treatment limited secondary to decreased cognition     Plan   Physical Therapy Plan  General Plan: 3-5 times per week  Current Treatment Recommendations: Strengthening, Therapeutic activities, Functional mobility training, Transfer training, 
Physical Therapy  PT referral received and chart reviewed.  Nursing request hold PT Eval today; pt unable to follow any commands to participate.  Will follow-up tomorrow as approp. Karen Rubio, PT.     
Pt arrived to room 5256 from ED via stretcher. Telemetry activated, CMU verified. Pt A&O x1, VSS. Assessment completed and unable to obtain history from PT, PT has Dementia .  Orders reviewed, medications verified. Pt resting comfortably in bed, voices no complaints at this time. Bed in lowest position with wheels locked and bed alarm on. Call light within reach. Care ongoing.    
Pt has mag IV, potassium IV, and D5 LR ordered at the same time. All compatible per [employer]. All initiated and running in same line.   
Pt initially fairly alert this AM when RN arrived. Pt has since become fairly lethargic only responding with mumbling when awakened.   Unable to take PO meds this am.   Daughter Ansley updated x2 and daughter in law updated x1.   Electronically signed by Ly Mauricio RN on 2/16/24 at 1:28 PM EST    Pt slightly more alert then earlier in shift but for very short periods of time. Remains unable to follow directions.   Pt remains NPO at this time d/t inability to remain awake. SLP eval pending when pt remains alert enough.   Electronically signed by Ly Mauricio RN on 2/16/24 at 4:29 PM EST      
Pt noted up talking clear. Pt alert to self and pleasantly confused. No distress or discomfort noted at this time. All safety measures in place. Daughter at bedside. Notified MD of pt change of status. No new orders at this time.   
Pt pulled X2 PIV this shift. IV sites noted with bruising, catheter intact, and dressings applied. No complications noted from pt pulling out IV's. Dr. Coates at bedside and notified of pt pulling IV's out. MD okay with no IV's currently. New orders place for IV medications changed to oral medications. Pt tolerating PO tablets with no difficulties. Pt consumed 100% breakfast with 240 ml Fluids with no difficulties.   
SLP ALL NOTES    Attempted to see patient for clinical swallow evaluation. RN reports patient still lethargic and unable to awaken adequately for PO intake. Will hold evaluation at this time and re-attempt 2/19/24.     Lauren Junior MA, CCC-SLP #[id number]  Speech-Language Pathologist  On 02/17/24 at 12:31 PM  
SLP NOTE    10:51 AM  Swallow evaluation order received and eval attempted. Patient unavailable at EEG at this time. ST to re-attempt as schedule permits pending status unless otherwise notified.    12:12 PM:   Swallow evaluation re-attempted. Patient remains out of room at EEG. ST to re-attempt as schedule permits pending status unless otherwise notified.    1:03 PM:  Swallow evaluation re-attempted now that patient has arrived back to room. Patient with diminished level of alertness at this time. ST to hold swallow evaluation d/t current mental status with plan to re-attempt as schedule permits pending status unless otherwise notified.    3:12 PM  Swallow evaluation re-attempted. Patient remains too lethargic with diminished level of alertness for assessment at this time. ST to re-attempt swallow evaluation at a later date as schedule permits pending patient's status unless otherwise notified.    Thank you.  Guillermina Crump MA, CCC-SLP, #[id number]  Speech-Language Pathologist  Portable phone: [phone]    
Set up pt dinner tray for pt. Pt able to eat independently after set up entire grilled cheese sandwich, chunky vegetable soup, brownie, and consumed 1 cup of cranberry juice, and 1 cup of water with no difficulties. Pt up in bed resting, call light within reach. All safety measures in place. No discomfort or distress noted. Updated daughter Ansley.   
Shift Summary    Admitting Dx:  Hiccups, Seizure activity     Shift Events:  No signs of seizure activity overnight   Patient swallowed pills whole with water   Did not sleep very well overnight     Vitals:  Vitals:    02/19/24 0400 02/19/24 0501 02/19/24 0533 02/19/24 0600   BP:  (!) 166/63     Pulse: 76 62  61   Resp:  16     Temp:  98.3 °F (36.8 °C)     TempSrc:  Oral     SpO2:  97%     Weight:   74.7 kg (164 lb 10.9 oz)    Height:            Wt Readings from Last 3 Encounters:   02/19/24 74.7 kg (164 lb 10.9 oz)   02/14/24 79.2 kg (174 lb 9.7 oz)   12/02/23 73.2 kg (161 lb 6 oz)        Tele:  SR    IV/Line:  No IV Access - MD Aware     Drains/Saunders:  Jenny     Neuro:   A&Ox1     I/O:   I/O last 3 completed shifts:  In: 2827.7 [P.O.:780; I.V.:2047.7]  Out: 2800 [Urine:2800]    I/O this shift:  In: 240 [P.O.:240]  Out: -                
pt lethergic and unable to take PO medications. Held all morning PO medications. Unable to wake pt up unless you do sternum rub. BS noted at 69 this morning. Notified Dr. Coates. New order for continuous D5 with LR fluids ordered. Pt started IV fluids and tolerating well. Pt in bed resting. No distress or discomfort noted. All safety measures in place.   
Disease in her mother; Stroke in her mother.    Patient is allergic to desmopressin acetate, amoxicillin, and penicillins.      Current Facility-Administered Medications:     levETIRAcetam (KEPPRA) tablet 250 mg, 250 mg, Oral, BID, Marcellus Lagunas APRN - CNP    atorvastatin (LIPITOR) tablet 40 mg, 40 mg, Oral, Daily, Melyssa Saldana MD    levothyroxine (SYNTHROID) tablet 50 mcg, 50 mcg, Oral, Daily, Melyssa Saldana MD, 50 mcg at 02/16/24 0634    sertraline (ZOLOFT) tablet 25 mg, 25 mg, Oral, Daily, Melyssa Saldana MD    sertraline (ZOLOFT) tablet 50 mg, 50 mg, Oral, Daily, Melyssa Saldana MD    pantoprazole (PROTONIX) tablet 40 mg, 40 mg, Oral, QAM AC, Melyssa Saldana MD    sodium chloride flush 0.9 % injection 10 mL, 10 mL, IntraVENous, 2 times per day, Melyssa Saldana MD, 10 mL at 02/16/24 2025    sodium chloride flush 0.9 % injection 10 mL, 10 mL, IntraVENous, PRN, Melyssa Saldana MD    0.9 % sodium chloride infusion, , IntraVENous, PRN, Melyssa Saldana MD    potassium chloride (KLOR-CON M) extended release tablet 40 mEq, 40 mEq, Oral, PRN **OR** potassium bicarb-citric acid (EFFER-K) effervescent tablet 40 mEq, 40 mEq, Oral, PRN **OR** potassium chloride 10 mEq/100 mL IVPB (Peripheral Line), 10 mEq, IntraVENous, PRN, Melyssa Saldana MD    magnesium sulfate 2000 mg in 50 mL IVPB premix, 2,000 mg, IntraVENous, PRN, Melyssa Saldana MD    enoxaparin (LOVENOX) injection 40 mg, 40 mg, SubCUTAneous, Daily, Melyssa Saldana MD, 40 mg at 02/16/24 0917    ondansetron (ZOFRAN-ODT) disintegrating tablet 4 mg, 4 mg, Oral, Q8H PRN **OR** ondansetron (ZOFRAN) injection 4 mg, 4 mg, IntraVENous, Q6H PRN, Melyssa Saldana MD    polyethylene glycol (GLYCOLAX) packet 17 g, 17 g, Oral, Daily PRN, Melyssa Saldana MD    acetaminophen (TYLENOL) tablet 650 mg, 650 mg, Oral, Q6H PRN **OR** acetaminophen (TYLENOL) suppository 650 mg, 650 mg, Rectal, Q6H PRN, Melyssa Saldana MD    dorzolamide 
Oral QAM AC    sodium chloride flush  10 mL IntraVENous 2 times per day    enoxaparin  40 mg SubCUTAneous Daily    dorzolamide  1 drop Both Eyes TID    brimonidine  1 drop Both Eyes TID     Continuous Infusions:   dextrose 5% in lactated ringers 100 mL/hr at 02/18/24 0730    sodium chloride       Medications Prior to Admission:   cholestyramine light 4 g packet, Take 1 packet by mouth daily  levothyroxine (SYNTHROID) 50 MCG tablet, Take 1 tablet by mouth Daily  sertraline (ZOLOFT) 25 MG tablet, Take 1 tablet by mouth daily Take with sertraline 50mg tab (total 75mg)  sertraline (ZOLOFT) 50 MG tablet, Take 1 tablet by mouth daily Take with sertraline 25mg (75mg total)  vitamin B-12 (CYANOCOBALAMIN) 1000 MCG tablet, Take 1 tablet by mouth daily  acetaminophen (TYLENOL) 500 MG tablet, Take 2 tablets by mouth 3 times daily as needed for Pain (discomfort)  brimonidine (ALPHAGAN) 0.2 % ophthalmic solution, 1 drop daily Drop into \"affected eye\"  Cranberry-Vitamin C-Vitamin E 4200-20-3 MG-MG-UNIT CAPS, Take 1 capsule by mouth daily  atorvastatin (LIPITOR) 40 MG tablet, Take 1 tablet by mouth daily  brinzolamide-brimonidine 1-0.2 % SUSP, Place 1 drop into both eyes daily    Allergies   Allergen Reactions    Desmopressin Acetate Other (See Comments)     Eye irritation  Confusion and sodium low    Amoxicillin      diarrhea    Penicillins Rash     Family History   Problem Relation Age of Onset    Diabetes Mother     Stroke Mother     Heart Disease Mother     COPD Brother      Social History     Tobacco Use   Smoking Status Never   Smokeless Tobacco Never     Social History     Substance and Sexual Activity   Drug Use No     Social History     Substance and Sexual Activity   Alcohol Use No     ROS -  afebrile.  Chart reviewed.      Exam  Blood pressure (!) 176/73, pulse 64, temperature 97.8 °F (36.6 °C), temperature source Oral, resp. rate 19, height 1.549 m (5' 1\"), weight 74.7 kg (164 lb 10.9 oz), SpO2 91 
Moist ; Thin liquids      Comments regarding tolerating Current Diet: no concerns/complaints; patient reports satisfaction with texture      Objective:     Pain: Did not state               Vision: [x] adequate for dysphagia needs [] Impaired:  Hearing: [x]adequate for dysphagia needs [] Impaired:     Cognitive/behavioral/communication:   Oriented to [x] self [] place [] date [] situation  [x]alert []lethargic  [x]cooperative []self-limiting   [x]confused   [x]distractible []agitated []impulsive  [x]verbally responsive []nonverbal []limited verbal responses  [x]follows one step commands []does not follow dx []follows complex commands  []aphasic []dysarthric  [] other:     Respiratory Status: [x]Room Air []O2 via nasal cannula []Other:  Dentition: []Adequate []Dentures [x]Missing Many Teeth []Edentulous []Other:  Patient Positioning: Upright in chair    PO Trials: seen with lunch  Thin Liquids: no overt signs/symptoms of penetration/aspiration  Nectar thick liquids  Honey Thick liquids  Puree: prolonged but adequate bolus formation and movement; no overt signs/symptoms of penetration/aspiration   Minced food: prolonged but adequate bolus formation and movement; no overt signs/symptoms of penetration/aspiration  Soft food: persistent concern for excess fatigue/effort with soft d/t reduced endurance but improving tolerance; prolonged bolus prep; no overt signs/symptoms of penetration/aspiration  Regular food    Dysphagia Tx:   Direct Dysphagia tx: PO trials as described above; rec to continue minced/thin; monitor for upgrade readiness  Dysphagia ex: NA  Training in compensatory strategies: set-up  Pt response to ex/training: patient agreeable and cooperative; cues for sustained attention to task d/t distractibility; poor historian    Goals:    Pt will functionally tolerate recommended diet with no overt clinical s/s of aspiration continue  Pt will advance to least restrictive diet as indicated continue 
Inpatient ADL T-Scale Score : 33.39  ADL Inpatient CMS 0-100% Score: 59.67  ADL Inpatient CMS G-Code Modifier : CK    Goals  Short Term Goals  Time Frame for Short Term Goals: prior to d/c  Short Term Goal 1: pt will complete toileting w/ Min A  Short Term Goal 2: pt will complete LB dressing w/ Min A  Short Term Goal 3: pt will complete bathing w/ Min A  Short Term Goal 4: pt will complete BUE HEP in all planes to assist w/ fxl ADL transfers  Long Term Goals  Time Frame for Long Term Goals : LTG=STG  Patient Goals   Patient goals : pt did not state goals       Therapy Time   Individual Concurrent Group Co-treatment   Time In 1004         Time Out 1020         Minutes 16         Timed Code Treatment Minutes: 16 Minutes       Chandler Felix OTR/L [id number]

## 2024-04-29 NOTE — PROGRESS NOTES
48 hour ZIO monitor # [id number] applied per order per Dr Lazo for AFib.  Instructions given and questions answered.  Bedside nurse aware.    Patient denies any pain. Bed alarm on. Call light within reach. Patient verbalizes to call out prior to wanting to get up out of bed to use the bathroom. Patient alert and oriented. 75 ml/hr cont. I.v. Will continue to monitor and assess.

## (undated) DEVICE — PADDING UNDERCAST W4INXL4YD 100% COT CRIMPED FINISH WBRL II

## (undated) DEVICE — CANNULATED DRILL

## (undated) DEVICE — SUTURE VCRL SZ 2-0 L18IN ABSRB UD CT-1 L36MM 1/2 CIR J839D

## (undated) DEVICE — STAPLER SKIN H3.9MM WIRE DIA0.58MM CRWN 6.9MM 35 STPL FIX

## (undated) DEVICE — NEEDLE HYPO 22GA L1 1/2IN PIVOTING SHLD FOR LUERLOCK SYR

## (undated) DEVICE — MAJOR SET UP PK

## (undated) DEVICE — CHLORAPREP 26ML ORANGE

## (undated) DEVICE — THREADED GUIDE WIRE

## (undated) DEVICE — PAD,ABDOMINAL,8"X7.5",STERILE,LF,1/PK: Brand: MEDLINE

## (undated) DEVICE — Z DISCONTINUED USE 2275686 GLOVE SURG SZ 8 L12IN FNGR THK13MIL WHT ISOLEX POLYISOPRENE

## (undated) DEVICE — 3M™ COBAN™ NL STERILE NON-LATEX SELF-ADHERENT WRAP, 2084S, 4 IN X 5 YD (10 CM X 4,5 M), 18 ROLLS/CASE: Brand: 3M™ COBAN™

## (undated) DEVICE — GOWN SIRUS NONREIN LG W/TWL: Brand: MEDLINE INDUSTRIES, INC.

## (undated) DEVICE — CANISTER, RIGID, 1200CC: Brand: MEDLINE INDUSTRIES, INC.

## (undated) DEVICE — GLOVE SURG SZ 6 L12IN FNGR THK87MIL DK GRN LTX FREE ISOLEX

## (undated) DEVICE — COVER LT HNDL BLU PLAS

## (undated) DEVICE — GOWN SIRUS NONREIN XL W/TWL: Brand: MEDLINE INDUSTRIES, INC.

## (undated) DEVICE — BANDAGE COMPR W6INXL4.5YD LTWT E EC SGL LAYERED CLP CLSR

## (undated) DEVICE — SUTURE VCRL SZ 3-0 L18IN ABSRB UD L26MM SH 1/2 CIR J864D

## (undated) DEVICE — Z DISCONTINUED PER MEDLINE USE 2752023 DRESSING FOAM W7.62XL7.62CM SIL ADH WTRPRF FLM BK SQ SHP

## (undated) DEVICE — KIT OR ROOM TURNOVER W/STRAP

## (undated) DEVICE — 6619 2 PTNT ISO SYS INCISE AREA&LT;(&GT;&&LT;)&GT;P: Brand: STERI-DRAPE™ IOBAN™ 2

## (undated) DEVICE — GLOVE SURG SZ 8 L12IN FNGR THK87MIL WHT LTX FREE

## (undated) DEVICE — SYRINGE MED 10ML LUERLOCK TIP W/O SFTY DISP

## (undated) DEVICE — SOLUTION IV IRRIG POUR BRL 0.9% SODIUM CHL 2F7124